# Patient Record
Sex: MALE | Race: WHITE | NOT HISPANIC OR LATINO | Employment: OTHER | URBAN - METROPOLITAN AREA
[De-identification: names, ages, dates, MRNs, and addresses within clinical notes are randomized per-mention and may not be internally consistent; named-entity substitution may affect disease eponyms.]

---

## 2017-01-23 RX ORDER — LATANOPROST 50 UG/ML
SOLUTION/ DROPS OPHTHALMIC
Qty: 2.5 ML | Refills: 0 | Status: SHIPPED | OUTPATIENT
Start: 2017-01-23 | End: 2017-02-18 | Stop reason: SDUPTHER

## 2017-02-20 RX ORDER — LATANOPROST 50 UG/ML
SOLUTION/ DROPS OPHTHALMIC
Qty: 2.5 ML | Refills: 0 | Status: SHIPPED | OUTPATIENT
Start: 2017-02-20 | End: 2017-03-20 | Stop reason: SDUPTHER

## 2017-03-20 RX ORDER — LATANOPROST 50 UG/ML
SOLUTION/ DROPS OPHTHALMIC
Qty: 2.5 ML | Refills: 0 | Status: SHIPPED | OUTPATIENT
Start: 2017-03-20 | End: 2017-04-13 | Stop reason: SDUPTHER

## 2017-04-13 RX ORDER — LATANOPROST 50 UG/ML
SOLUTION/ DROPS OPHTHALMIC
Qty: 2.5 ML | Refills: 0 | Status: SHIPPED | OUTPATIENT
Start: 2017-04-13 | End: 2017-05-14 | Stop reason: SDUPTHER

## 2017-05-15 RX ORDER — LATANOPROST 50 UG/ML
SOLUTION/ DROPS OPHTHALMIC
Qty: 2.5 ML | Refills: 0 | Status: SHIPPED | OUTPATIENT
Start: 2017-05-15 | End: 2017-06-23 | Stop reason: SDUPTHER

## 2017-05-15 RX ORDER — METOPROLOL TARTRATE 25 MG/1
TABLET, FILM COATED ORAL
Qty: 60 TABLET | Refills: 7 | Status: ON HOLD | OUTPATIENT
Start: 2017-05-15 | End: 2018-03-22 | Stop reason: HOSPADM

## 2017-06-23 RX ORDER — LATANOPROST 50 UG/ML
SOLUTION/ DROPS OPHTHALMIC
Qty: 2.5 ML | Refills: 0 | Status: SHIPPED | OUTPATIENT
Start: 2017-06-23 | End: 2017-07-29 | Stop reason: SDUPTHER

## 2017-07-31 ENCOUNTER — HOSPITAL ENCOUNTER (OUTPATIENT)
Dept: RADIOLOGY | Facility: HOSPITAL | Age: 82
Discharge: HOME | End: 2017-07-31
Attending: INTERNAL MEDICINE
Payer: MEDICARE

## 2017-07-31 ENCOUNTER — OFFICE VISIT (OUTPATIENT)
Dept: FAMILY MEDICINE | Facility: CLINIC | Age: 82
End: 2017-07-31
Payer: MEDICARE

## 2017-07-31 VITALS
HEART RATE: 72 BPM | DIASTOLIC BLOOD PRESSURE: 78 MMHG | RESPIRATION RATE: 16 BRPM | BODY MASS INDEX: 24.6 KG/M2 | HEIGHT: 70 IN | WEIGHT: 171.81 LBS | SYSTOLIC BLOOD PRESSURE: 118 MMHG

## 2017-07-31 DIAGNOSIS — J40 BRONCHITIS: Primary | ICD-10-CM

## 2017-07-31 DIAGNOSIS — R06.02 SOB (SHORTNESS OF BREATH): ICD-10-CM

## 2017-07-31 DIAGNOSIS — R05.9 COUGH: ICD-10-CM

## 2017-07-31 PROCEDURE — 94640 AIRWAY INHALATION TREATMENT: CPT | Mod: PBBFAC

## 2017-07-31 PROCEDURE — 99999 PR STA SHADOW: CPT | Mod: PBBFAC,,,

## 2017-07-31 PROCEDURE — 99213 OFFICE O/P EST LOW 20 MIN: CPT | Mod: PBBFAC,25 | Performed by: NURSE PRACTITIONER

## 2017-07-31 PROCEDURE — 71020 XR CHEST PA AND LATERAL: CPT | Mod: 26,,, | Performed by: RADIOLOGY

## 2017-07-31 PROCEDURE — 99999 PR STA SHADOW: CPT | Mod: PBBFAC,,, | Performed by: NURSE PRACTITIONER

## 2017-07-31 PROCEDURE — 99214 OFFICE O/P EST MOD 30 MIN: CPT | Mod: S$PBB | Performed by: NURSE PRACTITIONER

## 2017-07-31 PROCEDURE — 99999 PR PBB SHADOW E&M-EST. PATIENT-LVL III: CPT | Mod: PBBFAC,,, | Performed by: NURSE PRACTITIONER

## 2017-07-31 PROCEDURE — 96372 THER/PROPH/DIAG INJ SC/IM: CPT | Mod: PBBFAC,59

## 2017-07-31 RX ORDER — PROMETHAZINE HYDROCHLORIDE AND CODEINE PHOSPHATE 6.25; 1 MG/5ML; MG/5ML
5 SOLUTION ORAL 2 TIMES DAILY PRN
Qty: 90 ML | Refills: 0 | Status: SHIPPED | OUTPATIENT
Start: 2017-07-31 | End: 2017-08-10

## 2017-07-31 RX ORDER — ALBUTEROL SULFATE 0.83 MG/ML
2.5 SOLUTION RESPIRATORY (INHALATION)
Status: COMPLETED | OUTPATIENT
Start: 2017-07-31 | End: 2017-07-31

## 2017-07-31 RX ORDER — ALBUTEROL SULFATE 90 UG/1
2 AEROSOL, METERED RESPIRATORY (INHALATION) EVERY 6 HOURS PRN
Qty: 18 G | Refills: 0 | Status: SHIPPED | OUTPATIENT
Start: 2017-07-31 | End: 2017-08-16 | Stop reason: SDUPTHER

## 2017-07-31 RX ORDER — CYCLOSPORINE 0.5 MG/ML
EMULSION OPHTHALMIC
Refills: 9 | COMMUNITY
Start: 2017-06-20 | End: 2017-08-10

## 2017-07-31 RX ORDER — METHYLPREDNISOLONE ACETATE 40 MG/ML
40 INJECTION, SUSPENSION INTRA-ARTICULAR; INTRALESIONAL; INTRAMUSCULAR; SOFT TISSUE
Status: COMPLETED | OUTPATIENT
Start: 2017-07-31 | End: 2017-07-31

## 2017-07-31 RX ORDER — CEPHALEXIN 250 MG/1
250 CAPSULE ORAL EVERY 12 HOURS
Qty: 20 CAPSULE | Refills: 0 | Status: SHIPPED | OUTPATIENT
Start: 2017-07-31 | End: 2017-08-10

## 2017-07-31 RX ORDER — LATANOPROST 50 UG/ML
SOLUTION/ DROPS OPHTHALMIC
Qty: 2.5 ML | Refills: 0 | Status: SHIPPED | OUTPATIENT
Start: 2017-07-31 | End: 2017-08-22 | Stop reason: SDUPTHER

## 2017-07-31 RX ORDER — CEFTRIAXONE 250 MG/1
250 INJECTION, POWDER, FOR SOLUTION INTRAMUSCULAR; INTRAVENOUS
Status: COMPLETED | OUTPATIENT
Start: 2017-07-31 | End: 2017-07-31

## 2017-07-31 RX ADMIN — CEFTRIAXONE SODIUM 250 MG: 250 INJECTION, POWDER, FOR SOLUTION INTRAMUSCULAR; INTRAVENOUS at 06:07

## 2017-07-31 RX ADMIN — METHYLPREDNISOLONE ACETATE 40 MG: 40 INJECTION, SUSPENSION INTRA-ARTICULAR; INTRALESIONAL; INTRAMUSCULAR; SOFT TISSUE at 06:07

## 2017-07-31 RX ADMIN — ALBUTEROL SULFATE 2.5 MG: 2.5 SOLUTION RESPIRATORY (INHALATION) at 05:07

## 2017-07-31 NOTE — PROGRESS NOTES
Subjective:       Patient ID: See Leo is a 89 y.o. male.    Chief Complaint: Cough    Saw Dr. Anderson this morning with chest pain. Ruled out any cardiac cause. Has been wheezing and coughing for 2-3 weeks and getting worse      Cough   Episode onset: 2-3 weeks ago. The problem has been gradually worsening. The cough is productive of sputum. Associated symptoms include chest pain (this morning) and wheezing. Pertinent negatives include no ear pain, fever, headaches, rash, sore throat or shortness of breath.     Review of Systems   Constitutional: Negative.  Negative for appetite change, fatigue and fever.   HENT: Negative.  Negative for congestion, ear pain and sore throat.    Eyes: Negative.  Negative for visual disturbance.   Respiratory: Positive for cough and wheezing. Negative for shortness of breath.    Cardiovascular: Positive for chest pain (this morning). Negative for palpitations.   Gastrointestinal: Negative.  Negative for abdominal pain, diarrhea, nausea and vomiting.   Genitourinary: Negative.  Negative for difficulty urinating.   Musculoskeletal: Negative.    Skin: Negative.  Negative for rash.   Neurological: Negative.  Negative for headaches.   Psychiatric/Behavioral: Negative.  Negative for sleep disturbance. The patient is not nervous/anxious.    All other systems reviewed and are negative.      Objective:      Physical Exam   Constitutional: He is oriented to person, place, and time. He appears well-developed and well-nourished.   HENT:   Head: Normocephalic.   Nose: Nose normal.   Mouth/Throat: Oropharynx is clear and moist.   Eyes: Conjunctivae and EOM are normal. Pupils are equal, round, and reactive to light.   Neck: Normal range of motion. Neck supple.   Cardiovascular: Normal rate, regular rhythm and normal heart sounds.    No murmur heard.  Pulmonary/Chest: Effort normal. He has wheezes (throughout with decreased BS on exhalation).   Sounds much better after nebulizer treatment    Abdominal: Soft. Bowel sounds are normal.   Musculoskeletal: Normal range of motion.   Neurological: He is alert and oriented to person, place, and time.   Skin: Skin is warm and dry.   Psychiatric: He has a normal mood and affect.   Nursing note and vitals reviewed.      Assessment:       1. Bronchitis    2. Cough        Plan:   See was seen today for cough.    Diagnoses and all orders for this visit:    Bronchitis  -     albuterol nebulizer solution 2.5 mg; Take 3 mLs (2.5 mg total) by nebulization one time.  -     cefTRIAXone injection 250 mg; Inject 250 mg into the muscle one time.  -     methylPREDNISolone acetate injection 40 mg; Inject 1 mL (40 mg total) into the muscle one time.  -     cephALEXin (KEFLEX) 250 MG capsule; Take 1 capsule (250 mg total) by mouth every 12 (twelve) hours.  -     albuterol 90 mcg/actuation inhaler; Inhale 2 puffs into the lungs every 6 (six) hours as needed for Wheezing.    Cough  -     promethazine-codeine 6.25-10 mg/5 ml (PHENERGAN WITH CODEINE) 6.25-10 mg/5 mL syrup; Take 5 mLs by mouth 2 (two) times daily as needed for Cough.    RTC next week to see Dr. Gonzalez

## 2017-08-10 ENCOUNTER — OFFICE VISIT (OUTPATIENT)
Dept: FAMILY MEDICINE | Facility: CLINIC | Age: 82
End: 2017-08-10
Payer: MEDICARE

## 2017-08-10 VITALS
WEIGHT: 172.81 LBS | HEART RATE: 90 BPM | DIASTOLIC BLOOD PRESSURE: 60 MMHG | HEIGHT: 70 IN | BODY MASS INDEX: 24.74 KG/M2 | RESPIRATION RATE: 20 BRPM | SYSTOLIC BLOOD PRESSURE: 108 MMHG

## 2017-08-10 DIAGNOSIS — J40 BRONCHITIS: Primary | ICD-10-CM

## 2017-08-10 DIAGNOSIS — J44.1 CHRONIC OBSTRUCTIVE PULMONARY DISEASE WITH ACUTE EXACERBATION: ICD-10-CM

## 2017-08-10 PROCEDURE — 99212 OFFICE O/P EST SF 10 MIN: CPT | Mod: PBBFAC | Performed by: FAMILY MEDICINE

## 2017-08-10 PROCEDURE — 99999 PR STA SHADOW: CPT | Mod: PBBFAC,,, | Performed by: FAMILY MEDICINE

## 2017-08-10 PROCEDURE — 99213 OFFICE O/P EST LOW 20 MIN: CPT | Mod: S$PBB | Performed by: FAMILY MEDICINE

## 2017-08-10 PROCEDURE — 99999 PR PBB SHADOW E&M-EST. PATIENT-LVL II: CPT | Mod: PBBFAC,,, | Performed by: FAMILY MEDICINE

## 2017-08-10 RX ORDER — CEPHALEXIN 250 MG/1
250 CAPSULE ORAL 4 TIMES DAILY
COMMUNITY
End: 2017-08-16

## 2017-08-10 RX ORDER — PROMETHAZINE HYDROCHLORIDE AND CODEINE PHOSPHATE 6.25; 1 MG/5ML; MG/5ML
5 SOLUTION ORAL EVERY 4 HOURS PRN
COMMUNITY
End: 2017-08-16

## 2017-08-10 NOTE — PROGRESS NOTES
Subjective:       Patient ID: See Leo is a 89 y.o. male.    Chief Complaint: Follow-up    Pt is a 89 y.o. male who presents for evaluation and management of   Encounter Diagnoses   Name Primary?    Bronchitis Yes    Chronic obstructive pulmonary disease with acute exacerbation    .recheck  Finished keflex   No fever, still coughing, white sputum, improving   Still some wheezing  SOB     Doing well on current meds. Denies any side effects. Prevention is up to date.    Review of Systems   Constitutional: Negative for fever.   Respiratory: Positive for cough, shortness of breath and wheezing.        Objective:      Physical Exam   Constitutional: He is oriented to person, place, and time. He appears well-developed and well-nourished.   HENT:   Head: Normocephalic and atraumatic.   Right Ear: External ear normal.   Left Ear: External ear normal.   Nose: Nose normal.   Mouth/Throat: Oropharynx is clear and moist.   Eyes: Conjunctivae and EOM are normal. Pupils are equal, round, and reactive to light. Right eye exhibits no discharge. Left eye exhibits no discharge. No scleral icterus.   Neck: Normal range of motion. Neck supple. No JVD present. No tracheal deviation present. No thyromegaly present.   Cardiovascular: Normal rate, regular rhythm, normal heart sounds and intact distal pulses.    No murmur heard.  Pulmonary/Chest: Effort normal and breath sounds normal. No respiratory distress. He has no wheezes. He has no rales. He exhibits no tenderness.   Exp wheezes    Abdominal: Soft. Bowel sounds are normal. He exhibits no distension and no mass. There is no tenderness. There is no rebound and no guarding.   Musculoskeletal: Normal range of motion.   Lymphadenopathy:     He has no cervical adenopathy.   Neurological: He is alert and oriented to person, place, and time. He has normal reflexes. He displays normal reflexes. No cranial nerve deficit. He exhibits normal muscle tone. Coordination normal.   Skin: Skin  is warm and dry.   Psychiatric: He has a normal mood and affect. His behavior is normal. Judgment and thought content normal.       Assessment:       1. Bronchitis    2. Chronic obstructive pulmonary disease with acute exacerbation        Plan:   See was seen today for follow-up.    Diagnoses and all orders for this visit:    Bronchitis    Chronic obstructive pulmonary disease with acute exacerbation  -     umeclidinium-vilanterol (ANORO ELLIPTA) 62.5-25 mcg/actuation DsDv; Inhale 1 puff into the lungs once daily. Controller    acute bronchitis resolving but needs to be treated for COPD. Trying above   RTC 1 month     No Follow-up on file.

## 2017-08-16 ENCOUNTER — OFFICE VISIT (OUTPATIENT)
Dept: NEUROLOGY | Facility: CLINIC | Age: 82
End: 2017-08-16
Payer: MEDICARE

## 2017-08-16 VITALS
HEIGHT: 70 IN | WEIGHT: 170.06 LBS | HEART RATE: 80 BPM | SYSTOLIC BLOOD PRESSURE: 98 MMHG | DIASTOLIC BLOOD PRESSURE: 60 MMHG | RESPIRATION RATE: 19 BRPM | BODY MASS INDEX: 24.35 KG/M2

## 2017-08-16 DIAGNOSIS — J40 BRONCHITIS: ICD-10-CM

## 2017-08-16 DIAGNOSIS — R40.4 TRANSIENT ALTERATION OF AWARENESS: ICD-10-CM

## 2017-08-16 PROCEDURE — 99214 OFFICE O/P EST MOD 30 MIN: CPT | Mod: S$PBB | Performed by: NURSE PRACTITIONER

## 2017-08-16 PROCEDURE — 99214 OFFICE O/P EST MOD 30 MIN: CPT | Mod: PBBFAC | Performed by: NURSE PRACTITIONER

## 2017-08-16 PROCEDURE — 99999 PR PBB SHADOW E&M-EST. PATIENT-LVL IV: CPT | Mod: PBBFAC,,, | Performed by: NURSE PRACTITIONER

## 2017-08-16 PROCEDURE — 99999 PR STA SHADOW: CPT | Mod: PBBFAC,,, | Performed by: NURSE PRACTITIONER

## 2017-08-16 RX ORDER — ASPIRIN 81 MG/1
81 TABLET ORAL DAILY
Status: ON HOLD | COMMUNITY
End: 2018-02-06 | Stop reason: HOSPADM

## 2017-08-16 RX ORDER — ALBUTEROL SULFATE 90 UG/1
2 AEROSOL, METERED RESPIRATORY (INHALATION) EVERY 6 HOURS PRN
Qty: 18 INHALER | Refills: 0 | Status: SHIPPED | OUTPATIENT
Start: 2017-08-16 | End: 2017-08-17 | Stop reason: SDUPTHER

## 2017-08-16 NOTE — PROGRESS NOTES
See Leo is a 89 y.o. male with HTN, DLD, CAD with cardiomyopathy, and an Episode in 2010 of getting lost in familiar environment while driving. TIA work  up was largely unremarkable.     He presents today with report of ongoing occasional episodes of altered awareness. This occurred last week when he was at the cardiologist's office, blanked out, and did not know where he was. He wife has observed staring episodes, followed by confusion, which quickly resolves.     He sleeps quite well at night, but does have snoring. He sleeps with home O2 for his cardiac and respiratory issues.     His memory is overall good.     Review of Systems   Constitutional: Negative for fever.   HENT:        Snoring     Eyes: Negative for double vision.   Respiratory: Negative for hemoptysis.    Cardiovascular: Negative for leg swelling.   Gastrointestinal: Negative for blood in stool.   Genitourinary: Negative for hematuria.   Musculoskeletal: Negative for falls.   Skin: Negative for rash.   Neurological: Negative for dizziness and headaches.        Staring spells with confusion   Psychiatric/Behavioral: The patient does not have insomnia.      Exam:  Gen Appearance, well developed/nourished in no apparent distress  CV: 2+ distal pulses with no edema or swelling  Neuro:  MS: Awake, alert, oriented to place, person, time and situation  Sustains attention. Recent recall is intact to his recent medical history/remote memory intact, Language is full to  spontaneous speech/repetition/naming/comprehension. Fund of Knowledge is fair.  Patient has good judgement and insight and tells me about why he won't have pulmonology appointment and details of a conversation he had with cardiology   CN: Optic discs are flat with normal vasculature, PERRL, Extraoccular movements and visual fields are full. Normal facial sensation and strength, Tongue and Palate are  midline and strong. Shoulder Shrug symmetric and strong.  Motor: Normal bulk, tone, no  abnormal movements.No protonator  Sensory: intact vibration and temp  Cerebellar: Finger-nose, Rapid alternating movements intact  Gait: Normal stance, no ataxia---old injury to right leg, right knee is fused.Walks well with cane     Labs: Creatinine usually normal at baseline    Assessment/Recommendation: See Leo is a 89 y.o. male  HTN, DLD CAD with cardiomyopathy and an Episode in 2010 of getting lost in familiar environment while driving. TIA work  up was largely unremarkable. Some history of mild memory complaints.  I Recommend:  1. His prior and current memory complaints appear to be more fitting of transient alteration of awareness. I will check an EEG to assess for cortical irritability.   2. Order a CT Head, given his pacemaker, to assess for any areas of ischemia. His prior TIA workup was unremarkable.   3. If his EEG and CT Head are unremarkable, I will proceed with a sleep study to rule out CLAUDIA as the cause of his TAA episodes.   4. Continue Namenda for now.   5. He no longer drives.    Follow up in 3 months.

## 2017-08-17 ENCOUNTER — OFFICE VISIT (OUTPATIENT)
Dept: INTERNAL MEDICINE | Facility: CLINIC | Age: 82
End: 2017-08-17
Payer: MEDICARE

## 2017-08-17 VITALS
RESPIRATION RATE: 16 BRPM | OXYGEN SATURATION: 94 % | BODY MASS INDEX: 24.52 KG/M2 | SYSTOLIC BLOOD PRESSURE: 116 MMHG | DIASTOLIC BLOOD PRESSURE: 64 MMHG | HEIGHT: 70 IN | WEIGHT: 171.31 LBS | HEART RATE: 90 BPM

## 2017-08-17 DIAGNOSIS — J40 BRONCHITIS: ICD-10-CM

## 2017-08-17 DIAGNOSIS — J44.9 CHRONIC OBSTRUCTIVE PULMONARY DISEASE, UNSPECIFIED COPD TYPE: Primary | ICD-10-CM

## 2017-08-17 PROCEDURE — 99999 PR STA SHADOW: CPT | Mod: PBBFAC,,, | Performed by: INTERNAL MEDICINE

## 2017-08-17 PROCEDURE — 99213 OFFICE O/P EST LOW 20 MIN: CPT | Mod: S$PBB | Performed by: INTERNAL MEDICINE

## 2017-08-17 PROCEDURE — 99999 PR PBB SHADOW E&M-EST. PATIENT-LVL III: CPT | Mod: PBBFAC,,, | Performed by: INTERNAL MEDICINE

## 2017-08-17 PROCEDURE — 99213 OFFICE O/P EST LOW 20 MIN: CPT | Mod: PBBFAC | Performed by: INTERNAL MEDICINE

## 2017-08-17 RX ORDER — ALBUTEROL SULFATE 90 UG/1
2 AEROSOL, METERED RESPIRATORY (INHALATION) EVERY 6 HOURS PRN
Qty: 18 INHALER | Refills: 0 | Status: SHIPPED | OUTPATIENT
Start: 2017-08-17 | End: 2017-10-16 | Stop reason: SDUPTHER

## 2017-08-17 RX ORDER — METHYLPREDNISOLONE 4 MG/1
TABLET ORAL
Qty: 1 PACKAGE | Refills: 0 | Status: SHIPPED | OUTPATIENT
Start: 2017-08-17 | End: 2017-09-07

## 2017-08-17 RX ORDER — AZITHROMYCIN 250 MG/1
TABLET, FILM COATED ORAL
Qty: 6 TABLET | Refills: 0 | Status: SHIPPED | OUTPATIENT
Start: 2017-08-17 | End: 2017-08-22

## 2017-08-17 NOTE — PROGRESS NOTES
Subjective:       Patient ID: See Leo is a 89 y.o. male.    Chief Complaint: Shortness of Breath    See Leo is a 89 y.o. male  Here with 3 weeks history of cough ;wheezing :   finished his antibiotics; shots. Reports feeling better .  Reports shortness of breath with exertion; relieved with rest   He is using anoro daily   Seen butch : reports ran out albuterol       Shortness of Breath   Associated symptoms include wheezing. Pertinent negatives include no abdominal pain, chest pain, fever, headaches, leg swelling, rash, rhinorrhea, sore throat or vomiting.     Review of Systems   Constitutional: Positive for fatigue. Negative for activity change, chills and fever.        Low grade fever   HENT: Negative for congestion, postnasal drip, rhinorrhea and sore throat.    Eyes: Negative for pain, discharge and visual disturbance.   Respiratory: Positive for cough, shortness of breath and wheezing.    Cardiovascular: Negative for chest pain, palpitations and leg swelling.   Gastrointestinal: Negative for abdominal distention, abdominal pain, constipation, diarrhea, nausea and vomiting.   Musculoskeletal: Negative for back pain and joint swelling.   Skin: Negative for rash and wound.   Neurological: Negative for dizziness, syncope, weakness, light-headedness and headaches.   Psychiatric/Behavioral: Negative for confusion.       Objective:      Physical Exam   Constitutional: He is oriented to person, place, and time. He appears well-developed and well-nourished.   HENT:   Head: Normocephalic and atraumatic.   Right Ear: External ear normal.   Left Ear: External ear normal.   Nose: Nose normal.   Mouth/Throat: Oropharynx is clear and moist.   Eyes: Pupils are equal, round, and reactive to light.   Neck: Normal range of motion. Neck supple. No JVD present.   Cardiovascular: Normal rate, regular rhythm, normal heart sounds and intact distal pulses.    Pulmonary/Chest: Effort normal and breath sounds  normal.   Loose rhonchi clears some with cough   Abdominal: Soft. Bowel sounds are normal. There is no tenderness.   Musculoskeletal: Normal range of motion. He exhibits no edema.   Neurological: He is alert and oriented to person, place, and time. He has normal reflexes.   Skin: Skin is warm and dry.   Psychiatric: He has a normal mood and affect. His behavior is normal. Judgment and thought content normal.   Nursing note and vitals reviewed.      Assessment:       1. Chronic obstructive pulmonary disease, unspecified COPD type    2. Bronchitis        Plan:   See was seen today for shortness of breath.    Diagnoses and all orders for this visit:    Chronic obstructive pulmonary disease, unspecified COPD type  -     albuterol (VENTOLIN HFA) 90 mcg/actuation inhaler; Inhale 2 puffs into the lungs every 6 (six) hours as needed for Wheezing.  -     methylPREDNISolone (MEDROL DOSEPACK) 4 mg tablet; use as directed  -     azithromycin (Z-POLLO) 250 MG tablet; Take 2 tablets by mouth on day 1; Take 1 tablet by mouth on days 2-5    Bronchitis  -     azithromycin (Z-POLLO) 250 MG tablet; Take 2 tablets by mouth on day 1; Take 1 tablet by mouth on days 2-5    see DR Gonzalez  In 3-4 weeks.

## 2017-08-22 RX ORDER — LATANOPROST 50 UG/ML
SOLUTION/ DROPS OPHTHALMIC
Qty: 2.5 ML | Refills: 0 | Status: SHIPPED | OUTPATIENT
Start: 2017-08-22 | End: 2017-10-02 | Stop reason: SDUPTHER

## 2017-08-24 ENCOUNTER — HOSPITAL ENCOUNTER (OUTPATIENT)
Dept: RADIOLOGY | Facility: HOSPITAL | Age: 82
Discharge: HOME | End: 2017-08-24
Attending: NURSE PRACTITIONER
Payer: MEDICARE

## 2017-08-24 ENCOUNTER — HOSPITAL ENCOUNTER (OUTPATIENT)
Dept: PULMONOLOGY | Facility: HOSPITAL | Age: 82
Discharge: HOME | End: 2017-08-24
Attending: NURSE PRACTITIONER
Payer: MEDICARE

## 2017-08-24 DIAGNOSIS — R40.4 TRANSIENT ALTERATION OF AWARENESS: ICD-10-CM

## 2017-08-24 PROCEDURE — 70450 CT HEAD/BRAIN W/O DYE: CPT | Mod: TC

## 2017-08-24 PROCEDURE — 95816 EEG AWAKE AND DROWSY: CPT | Mod: 26,,, | Performed by: PSYCHIATRY & NEUROLOGY

## 2017-08-24 PROCEDURE — 70450 CT HEAD/BRAIN W/O DYE: CPT | Mod: 26,,, | Performed by: RADIOLOGY

## 2017-08-24 PROCEDURE — 95819 EEG AWAKE AND ASLEEP: CPT

## 2017-08-24 NOTE — PROGRESS NOTES
EEG REPORT      Patients Name: Felipa Cui  Date of Recordin17  Technician: Carmel  Patient :25    Referring Provider: ALEJANDRO Shahid  Reason for Exam: Seizure  Notable medications: None significant      INTRODUCTION:  This EEG was  recorded using 10-channels and the International 10/20 electrode placement system.  The patient was not sleep deprived.    FINDINGS:  This EEG was recording during wakefulness and drowsiness only.  A 10  Hz posterior dominant rhythm was persistently observed and of normal amplitude and symmetry.   Mild muscle artifact, Eye Blinks, Beta frequency artifact occurred throughout the recording  Focal slowing was not observed.   Epileptiform activity was not observed    Hyperventilation: Was not Performed    Photic Stimulation: Was not Performed    Clinical Impression:  Normal awake and  Drowsy EEG.      Kit Omalley MD

## 2017-08-25 ENCOUNTER — TELEPHONE (OUTPATIENT)
Dept: FAMILY MEDICINE | Facility: CLINIC | Age: 82
End: 2017-08-25

## 2017-08-25 NOTE — TELEPHONE ENCOUNTER
----- Message from Chuckie Horta sent at 2017  4:13 PM CDT -----  Contact: Wife - Sury Leo  MRN: [id number]  : 1925  PCP: Suhail Gonzalez  Home Phone      [phone]  Work Phone      Not on file.  Mobile          [phone]      MESSAGE: would like test results - had CT& EEG done yesterday (Dr Deborah Shahid) -- may need followup appt -- please advise    Call [phone]    PCP: Lisa

## 2017-08-25 NOTE — TELEPHONE ENCOUNTER
No answer       Notes Recorded by Leticia Martin LPN on 8/24/2017 at 2:18 PM CDT  Pt notified of results. Pt voiced understanding    Notes Recorded by Deborah Shahid NP on 8/24/2017 at 11:44 AM CDT  No stroke on Head CT.

## 2017-08-28 NOTE — TELEPHONE ENCOUNTER
Wife was notified the EEG has not been resulted and she will be called once results are final. Wife voiced understanding.

## 2017-08-29 DIAGNOSIS — G47.10 HYPERSOMNIA: ICD-10-CM

## 2017-08-29 DIAGNOSIS — R40.4 TRANSIENT ALTERATION OF AWARENESS: Primary | ICD-10-CM

## 2017-08-29 DIAGNOSIS — R06.83 SNORING: ICD-10-CM

## 2017-09-07 ENCOUNTER — OFFICE VISIT (OUTPATIENT)
Dept: FAMILY MEDICINE | Facility: CLINIC | Age: 82
End: 2017-09-07
Payer: MEDICARE

## 2017-09-07 VITALS
HEART RATE: 88 BPM | DIASTOLIC BLOOD PRESSURE: 58 MMHG | RESPIRATION RATE: 10 BRPM | BODY MASS INDEX: 24.52 KG/M2 | SYSTOLIC BLOOD PRESSURE: 90 MMHG | HEIGHT: 70 IN | OXYGEN SATURATION: 96 % | WEIGHT: 171.31 LBS

## 2017-09-07 DIAGNOSIS — J44.1 CHRONIC OBSTRUCTIVE PULMONARY DISEASE WITH ACUTE EXACERBATION: ICD-10-CM

## 2017-09-07 PROCEDURE — 99999 PR STA SHADOW: CPT | Mod: PBBFAC,,, | Performed by: FAMILY MEDICINE

## 2017-09-07 PROCEDURE — 99999 PR PBB SHADOW E&M-EST. PATIENT-LVL III: CPT | Mod: PBBFAC,,, | Performed by: FAMILY MEDICINE

## 2017-09-07 PROCEDURE — 99213 OFFICE O/P EST LOW 20 MIN: CPT | Mod: PBBFAC | Performed by: FAMILY MEDICINE

## 2017-09-07 PROCEDURE — 99213 OFFICE O/P EST LOW 20 MIN: CPT | Mod: S$PBB | Performed by: FAMILY MEDICINE

## 2017-09-07 RX ORDER — BUDESONIDE 0.5 MG/2ML
0.5 INHALANT ORAL 2 TIMES DAILY
Qty: 60 ML | Refills: 5 | Status: SHIPPED | OUTPATIENT
Start: 2017-09-07 | End: 2017-12-26 | Stop reason: SDUPTHER

## 2017-09-07 RX ORDER — IPRATROPIUM BROMIDE AND ALBUTEROL SULFATE 2.5; .5 MG/3ML; MG/3ML
3 SOLUTION RESPIRATORY (INHALATION) EVERY 6 HOURS PRN
Qty: 1 BOX | Refills: 2 | Status: SHIPPED | OUTPATIENT
Start: 2017-09-07 | End: 2018-09-07

## 2017-09-07 NOTE — PROGRESS NOTES
Subjective:       Patient ID: See Leo is a 89 y.o. male.    Chief Complaint: Follow-up (cough)    Pt is a 89 y.o. male who presents for evaluation and management of   Encounter Diagnosis   Name Primary?    Chronic obstructive pulmonary disease with acute exacerbation    .  Doing well on current meds. Denies any side effects. Prevention is up to date.  Review of Systems   Constitutional: Negative for fever.   Respiratory: Positive for cough, shortness of breath and wheezing.         Symptoms improved wit hanoro but wears off in the afternoon          Objective:      Physical Exam   Constitutional: He is oriented to person, place, and time. He appears well-developed and well-nourished.   HENT:   Head: Normocephalic and atraumatic.   Right Ear: External ear normal.   Left Ear: External ear normal.   Nose: Nose normal.   Mouth/Throat: Oropharynx is clear and moist.   Eyes: Conjunctivae and EOM are normal. Pupils are equal, round, and reactive to light. Right eye exhibits no discharge. Left eye exhibits no discharge. No scleral icterus.   Neck: Normal range of motion. Neck supple. No JVD present. No tracheal deviation present. No thyromegaly present.   Cardiovascular: Normal rate, regular rhythm, normal heart sounds and intact distal pulses.    No murmur heard.  Pulmonary/Chest: Effort normal and breath sounds normal. No respiratory distress. He has no wheezes. He has no rales. He exhibits no tenderness.   Exp wheezes    Abdominal: Soft. Bowel sounds are normal. He exhibits no distension and no mass. There is no tenderness. There is no rebound and no guarding.   Musculoskeletal: Normal range of motion.   Lymphadenopathy:     He has no cervical adenopathy.   Neurological: He is alert and oriented to person, place, and time. He has normal reflexes. He displays normal reflexes. No cranial nerve deficit. He exhibits normal muscle tone. Coordination normal.   Skin: Skin is warm and dry.   Psychiatric: He has a normal  mood and affect. His behavior is normal. Judgment and thought content normal.       Assessment:       1. Chronic obstructive pulmonary disease with acute exacerbation        Plan:   See was seen today for follow-up.    Diagnoses and all orders for this visit:    Chronic obstructive pulmonary disease with acute exacerbation  -     umeclidinium-vilanterol (ANORO ELLIPTA) 62.5-25 mcg/actuation DsDv; Inhale 1 puff into the lungs once daily. Controller  -     NEBULIZER FOR HOME USE  -     OXYGEN FOR HOME USE  -     albuterol-ipratropium 2.5mg-0.5mg/3mL (DUO-NEB) 0.5 mg-3 mg(2.5 mg base)/3 mL nebulizer solution; Take 3 mLs by nebulization every 6 (six) hours as needed for Wheezing. Rescue  -     budesonide (PULMICORT) 0.5 mg/2 mL nebulizer solution; Take 2 mLs (0.5 mg total) by nebulization 2 (two) times daily. Controller    add pulmicort BID and duoneb for prn   RTC 3 months     No Follow-up on file.

## 2017-09-11 RX ORDER — TAMSULOSIN HYDROCHLORIDE 0.4 MG/1
0.4 CAPSULE ORAL DAILY
Qty: 30 CAPSULE | Refills: 11 | Status: SHIPPED | OUTPATIENT
Start: 2017-09-11 | End: 2018-09-11

## 2017-10-02 RX ORDER — LATANOPROST 50 UG/ML
SOLUTION/ DROPS OPHTHALMIC
Qty: 2.5 ML | Refills: 0 | Status: SHIPPED | OUTPATIENT
Start: 2017-10-02 | End: 2017-11-16 | Stop reason: SDUPTHER

## 2017-10-03 RX ORDER — MEMANTINE HYDROCHLORIDE 10 MG/1
10 TABLET ORAL 2 TIMES DAILY
Qty: 180 TABLET | Refills: 3 | Status: SHIPPED | OUTPATIENT
Start: 2017-10-03

## 2017-10-16 DIAGNOSIS — J44.9 CHRONIC OBSTRUCTIVE PULMONARY DISEASE, UNSPECIFIED COPD TYPE: ICD-10-CM

## 2017-10-17 RX ORDER — ALBUTEROL SULFATE 90 UG/1
2 AEROSOL, METERED RESPIRATORY (INHALATION) EVERY 6 HOURS PRN
Qty: 18 INHALER | Refills: 0 | Status: ON HOLD | OUTPATIENT
Start: 2017-10-17 | End: 2018-03-22 | Stop reason: HOSPADM

## 2017-10-21 ENCOUNTER — HOSPITAL ENCOUNTER (EMERGENCY)
Facility: HOSPITAL | Age: 82
Discharge: HOME | End: 2017-10-21
Attending: SURGERY
Payer: MEDICARE

## 2017-10-21 VITALS
SYSTOLIC BLOOD PRESSURE: 123 MMHG | WEIGHT: 174 LBS | HEIGHT: 70 IN | OXYGEN SATURATION: 95 % | RESPIRATION RATE: 28 BRPM | BODY MASS INDEX: 24.91 KG/M2 | DIASTOLIC BLOOD PRESSURE: 63 MMHG | TEMPERATURE: 97 F | HEART RATE: 87 BPM

## 2017-10-21 DIAGNOSIS — R05.9 COUGH: ICD-10-CM

## 2017-10-21 DIAGNOSIS — J40 BRONCHITIS: Primary | ICD-10-CM

## 2017-10-21 LAB
ALBUMIN SERPL BCP-MCNC: 2.8 G/DL
ALP SERPL-CCNC: 98 U/L
ALT SERPL W/O P-5'-P-CCNC: 11 U/L
ANION GAP SERPL CALC-SCNC: 9 MMOL/L
APTT BLDCRRT: 25.2 SEC
AST SERPL-CCNC: 12 U/L
BASOPHILS # BLD AUTO: 0.01 K/UL
BASOPHILS NFR BLD: 0.1 %
BILIRUB SERPL-MCNC: 0.8 MG/DL
BNP SERPL-MCNC: 452 PG/ML
BUN SERPL-MCNC: 14 MG/DL
CALCIUM SERPL-MCNC: 9.2 MG/DL
CHLORIDE SERPL-SCNC: 103 MMOL/L
CK MB SERPL-MCNC: 1 NG/ML
CK MB SERPL-MCNC: 1 NG/ML
CK MB SERPL-RTO: 3.1 %
CK MB SERPL-RTO: 4.5 %
CK SERPL-CCNC: 22 U/L
CK SERPL-CCNC: 22 U/L
CK SERPL-CCNC: 32 U/L
CK SERPL-CCNC: 32 U/L
CO2 SERPL-SCNC: 25 MMOL/L
CREAT SERPL-MCNC: 0.9 MG/DL
DIFFERENTIAL METHOD: ABNORMAL
EOSINOPHIL # BLD AUTO: 0 K/UL
EOSINOPHIL NFR BLD: 0.3 %
ERYTHROCYTE [DISTWIDTH] IN BLOOD BY AUTOMATED COUNT: 15.9 %
EST. GFR  (AFRICAN AMERICAN): >60 ML/MIN/1.73 M^2
EST. GFR  (NON AFRICAN AMERICAN): >60 ML/MIN/1.73 M^2
GLUCOSE SERPL-MCNC: 94 MG/DL
HCT VFR BLD AUTO: 43.5 %
HGB BLD-MCNC: 14.4 G/DL
INR PPP: 1.1
LYMPHOCYTES # BLD AUTO: 1 K/UL
LYMPHOCYTES NFR BLD: 10.8 %
MCH RBC QN AUTO: 29.5 PG
MCHC RBC AUTO-ENTMCNC: 33.1 G/DL
MCV RBC AUTO: 89 FL
MONOCYTES # BLD AUTO: 0.7 K/UL
MONOCYTES NFR BLD: 7.6 %
NEUTROPHILS # BLD AUTO: 7.6 K/UL
NEUTROPHILS NFR BLD: 81.2 %
PLATELET # BLD AUTO: 239 K/UL
PMV BLD AUTO: 9.5 FL
POTASSIUM SERPL-SCNC: 4.2 MMOL/L
PROT SERPL-MCNC: 6.8 G/DL
PROTHROMBIN TIME: 10.8 SEC
RBC # BLD AUTO: 4.88 M/UL
SODIUM SERPL-SCNC: 137 MMOL/L
TROPONIN I SERPL DL<=0.01 NG/ML-MCNC: 0.02 NG/ML
TROPONIN I SERPL DL<=0.01 NG/ML-MCNC: 0.02 NG/ML
WBC # BLD AUTO: 9.33 K/UL

## 2017-10-21 PROCEDURE — 99284 EMERGENCY DEPT VISIT MOD MDM: CPT | Mod: 25

## 2017-10-21 PROCEDURE — 36415 COLL VENOUS BLD VENIPUNCTURE: CPT

## 2017-10-21 PROCEDURE — 94640 AIRWAY INHALATION TREATMENT: CPT

## 2017-10-21 PROCEDURE — 96372 THER/PROPH/DIAG INJ SC/IM: CPT

## 2017-10-21 PROCEDURE — 25000242 PHARM REV CODE 250 ALT 637 W/ HCPCS: Performed by: SURGERY

## 2017-10-21 PROCEDURE — 83880 ASSAY OF NATRIURETIC PEPTIDE: CPT

## 2017-10-21 PROCEDURE — 82553 CREATINE MB FRACTION: CPT | Mod: 91

## 2017-10-21 PROCEDURE — 85610 PROTHROMBIN TIME: CPT

## 2017-10-21 PROCEDURE — 85730 THROMBOPLASTIN TIME PARTIAL: CPT

## 2017-10-21 PROCEDURE — 93005 ELECTROCARDIOGRAM TRACING: CPT

## 2017-10-21 PROCEDURE — 80053 COMPREHEN METABOLIC PANEL: CPT

## 2017-10-21 PROCEDURE — 84484 ASSAY OF TROPONIN QUANT: CPT | Mod: 91

## 2017-10-21 PROCEDURE — 85025 COMPLETE CBC W/AUTO DIFF WBC: CPT

## 2017-10-21 PROCEDURE — 63600175 PHARM REV CODE 636 W HCPCS: Performed by: SURGERY

## 2017-10-21 RX ORDER — LEVOFLOXACIN 500 MG/1
500 TABLET, FILM COATED ORAL DAILY
Qty: 7 TABLET | Refills: 0 | Status: SHIPPED | OUTPATIENT
Start: 2017-10-21 | End: 2017-10-28

## 2017-10-21 RX ORDER — IPRATROPIUM BROMIDE AND ALBUTEROL SULFATE 2.5; .5 MG/3ML; MG/3ML
3 SOLUTION RESPIRATORY (INHALATION)
Status: COMPLETED | OUTPATIENT
Start: 2017-10-21 | End: 2017-10-21

## 2017-10-21 RX ORDER — METHYLPREDNISOLONE SOD SUCC 125 MG
125 VIAL (EA) INJECTION
Status: COMPLETED | OUTPATIENT
Start: 2017-10-21 | End: 2017-10-21

## 2017-10-21 RX ORDER — PROMETHAZINE HYDROCHLORIDE AND CODEINE PHOSPHATE 6.25; 1 MG/5ML; MG/5ML
5 SOLUTION ORAL EVERY 4 HOURS PRN
Qty: 120 ML | Refills: 0 | Status: SHIPPED | OUTPATIENT
Start: 2017-10-21 | End: 2017-10-31

## 2017-10-21 RX ORDER — METHYLPREDNISOLONE 4 MG/1
TABLET ORAL
Qty: 1 PACKAGE | Refills: 0 | Status: SHIPPED | OUTPATIENT
Start: 2017-10-21 | End: 2017-11-17

## 2017-10-21 RX ADMIN — METHYLPREDNISOLONE SODIUM SUCCINATE 125 MG: 125 INJECTION, POWDER, FOR SOLUTION INTRAMUSCULAR; INTRAVENOUS at 01:10

## 2017-10-21 RX ADMIN — IPRATROPIUM BROMIDE AND ALBUTEROL SULFATE 3 ML: .5; 3 SOLUTION RESPIRATORY (INHALATION) at 12:10

## 2017-10-21 RX ADMIN — IPRATROPIUM BROMIDE AND ALBUTEROL SULFATE 3 ML: .5; 3 SOLUTION RESPIRATORY (INHALATION) at 01:10

## 2017-10-21 NOTE — ED PROVIDER NOTES
[facility]                                     October 21, 2017                   Chief Complaint  89 y.o. 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  Pt has a stable chest x-ray, family states is very noncompliant with meds/treatment    History is reviewed on the patient    Past Medical History   -- COPD (chronic obstructive pulmonary disease)      -- DM (diabetes mellitus)      -- Dyslipidemia      -- GERD (gastroesophageal reflux disease)      -- Heart failure      -- Paroxysmal a-fib      -- Carotid artery disease      -- S/P CABG x 4      -- HTN (hypertension), benign      -- CAD (coronary artery disease), native coronary artery      -- Cardiomyopathy      -- Left ventricular systolic dysfunction      -- Esophageal reflux          Past Surgical History   -- Hip surgery         -- Coronary artery bypass graft         -- Leg surgery         -- Tonsillectomy          No Known Allergies   History reviewed. No pertinent family history.    Review of Systems and Physical Exam     Review of Systems  -- Constitution - no fever, denies fatigue, no weakness, no chills  -- Eyes - no tearing or redness, no visual disturbance  -- Ear, Nose - no tinnitus or earache, no nasal congestion or discharge  -- Mouth,Throat - no sore throat, no toothache, normal voice, normal swallowing  -- Respiratory - cough and congestion, shortness of breath, no IBRAHIM  -- Cardiovascular - denies chest pain, no palpitations, denies claudication  -- Gastrointestinal - denies abdominal pain, nausea, vomiting,  or diarrhea  -- Musculoskeletal - denies back pain, negative for myalgias and arthralgias   -- Neurological - no headache, denies weakness or seizure; no LOC  -- Skin - denies pallor, rash, or changes in skin. no hives or welts noted    Vital Signs  -- His tympanic temperature is 96.9 °F (36.1 °C).  -- His blood pressure is 123/63 and his pulse is 87.   -- His respiration is 28 (abnormal) and oxygen saturation is 95%.      Physical Exam  -- Nursing note and vitals reviewed  -- Head: Atraumatic. Normocephalic. No obvious abnormality  -- Eyes: Pupils are equal and reactive to light. Normal conjunctiva and lids  -- Nose: Nose normal in appearance, nares grossly normal. No discharge  -- Throat: Mucous membranes moist, pharynx normal, normal tonsils. No lesions   -- Ears: External ears and TM normal bilaterally. Normal hearing and no drainage  -- Neck: Normal range of motion. Neck supple. No masses, trachea midline  -- Cardiac: Normal rate, regular rhythm and normal heart sounds  -- Pulmonary: faint rhonchi at the bilateral bases with no active wheezing   -- Abdominal: Soft, no tenderness. Normal bowel sounds. Normal liver edge  -- Musculoskeletal: Normal range of motion, no effusions. Joints stable   -- Neurological: No focal deficits. Showed good interaction with staff  -- Vascular: Posterior tibial, dorsalis pedis and radial pulses 2+ bilaterally      Emergency Room Course     Lab values  --    -- K 4.2   --    -- CO2 25   -- BUN 14   -- CREATININE 0.9   -- GLU 94   -- ALKPHOS 98   -- AST 12   -- ALT 11   -- BILITOT 0.8   -- ALBUMIN 2.8 (L)   -- PROT 6.8   -- WBC 9.33   -- HGB 14.4   -- HCT 43.5   --    -- CPK 32   -- CPK 32   -- CPKMB 1.0   -- TROPONINI 0.017   -- INR 1.1   --  (H)     EKG  -- The EKG findings today were without concerning findings from baseline  -- The troponin drawn in the ER today was within normal limits  -- The 2nd troponin drawn in the ER today was within normal limits      Radiology  -- Chest x-ray showed no infiltrate and showed no acute pathology     Medications Given  -- albuterol-ipratropium 2.5mg-0.5mg/3mL nebulizer solution 3 mL    -- albuterol-ipratropium 2.5mg-0.5mg/3mL nebulizer solution 3 mL    -- methylPREDNISolone sodium succinate injection 125 mg      Diagnosis  -- The primary encounter diagnosis was Bronchitis.   -- A diagnosis of Cough was also pertinent to this visit.    Disposition and Plan  -- Disposition: home  -- Condition: stable  -- Follow-up: Patient to follow up with Suhail Gonzalez MD in 1-2 days.  -- I advised the patient that we have found no life threatening condition today  -- At this time, I believe the patient is clinically stable for discharge.   -- The patient acknowledges that close follow up with a MD is required   -- Patient agrees to comply with all instruction and direction given in the ER    This note is dictated on Dragon Natural Speaking word recognition program.  There are word recognition mistakes that are occasionally missed on review.           Jeb Chávez MD  10/22/17 0618

## 2017-10-21 NOTE — ED TRIAGE NOTES
Patient comes in today stating he has had trouble breathing and spitting out blood.  This has been going on for about a week.

## 2017-10-24 ENCOUNTER — OFFICE VISIT (OUTPATIENT)
Dept: FAMILY MEDICINE | Facility: CLINIC | Age: 82
End: 2017-10-24
Payer: MEDICARE

## 2017-10-24 VITALS
HEART RATE: 84 BPM | OXYGEN SATURATION: 97 % | BODY MASS INDEX: 23.68 KG/M2 | SYSTOLIC BLOOD PRESSURE: 120 MMHG | WEIGHT: 165.38 LBS | DIASTOLIC BLOOD PRESSURE: 58 MMHG | HEIGHT: 70 IN | RESPIRATION RATE: 20 BRPM

## 2017-10-24 DIAGNOSIS — J44.1 CHRONIC OBSTRUCTIVE PULMONARY DISEASE WITH ACUTE EXACERBATION: Primary | ICD-10-CM

## 2017-10-24 DIAGNOSIS — J40 BRONCHITIS: ICD-10-CM

## 2017-10-24 PROCEDURE — 99999 PR STA SHADOW: CPT | Mod: PBBFAC,,, | Performed by: FAMILY MEDICINE

## 2017-10-24 PROCEDURE — 99999 PR PBB SHADOW E&M-EST. PATIENT-LVL III: CPT | Mod: PBBFAC,,, | Performed by: FAMILY MEDICINE

## 2017-10-24 PROCEDURE — 99214 OFFICE O/P EST MOD 30 MIN: CPT | Mod: S$PBB | Performed by: FAMILY MEDICINE

## 2017-10-24 PROCEDURE — 99213 OFFICE O/P EST LOW 20 MIN: CPT | Mod: PBBFAC | Performed by: FAMILY MEDICINE

## 2017-10-24 RX ORDER — FUROSEMIDE 20 MG/1
TABLET ORAL
Refills: 11 | COMMUNITY
Start: 2017-10-08 | End: 2018-03-19

## 2017-10-24 NOTE — PROGRESS NOTES
Subjective:       Patient ID: See Leo is a 89 y.o. male.    Chief Complaint: Follow-up    Patient is here for follow-up for COPD exacerbation.  He went to the emergency room 5 days ago.  Placed on a Medrol Dosepak and Levaquin.  He's been taking his albuterol treatments twice daily.  He takes Anoro once daily.  Chest x-ray was unremarkable.  He's feeling better but still coughing.      Review of Systems   Constitutional: Negative for activity change, chills, fatigue, fever and unexpected weight change.   HENT: Negative for sore throat and trouble swallowing.    Respiratory: Positive for cough, shortness of breath and wheezing. Negative for chest tightness.    Cardiovascular: Negative for chest pain, palpitations and leg swelling.   Gastrointestinal: Negative for abdominal pain.   Endocrine: Negative for cold intolerance and heat intolerance.   Genitourinary: Negative for difficulty urinating.   Musculoskeletal: Negative for back pain and joint swelling.   Skin: Negative for rash.   Neurological: Negative for numbness.   Hematological: Negative for adenopathy.   Psychiatric/Behavioral: Negative for decreased concentration.       Objective:      Vitals:    10/24/17 1422   BP: (!) 120/58   Pulse: 84   Resp: 20     Physical Exam   Constitutional: He appears well-developed and well-nourished.   Eyes: EOM are normal. Pupils are equal, round, and reactive to light.   Neck: Normal range of motion. Neck supple.   Cardiovascular: Normal rate, regular rhythm, normal heart sounds and intact distal pulses.  Exam reveals no gallop.    No murmur heard.  Pulmonary/Chest: Effort normal and breath sounds normal. He has no wheezes.   Musculoskeletal: He exhibits tenderness. He exhibits no edema.   Neurological: He has normal reflexes.       Assessment:       1. Chronic obstructive pulmonary disease with acute exacerbation    2. Bronchitis        Plan:   See was seen today for follow-up.    Diagnoses and all orders for this  visit:    Chronic obstructive pulmonary disease with acute exacerbation    Bronchitis  Finish taking Medrol Dosepak and Levaquin  Continue inhalers-Anoro, Pulmicort  Increase DuoNeb treatments  3-4 times daily    Return to clinic if not getting better or worsens.

## 2017-11-10 ENCOUNTER — TELEPHONE (OUTPATIENT)
Dept: FAMILY MEDICINE | Facility: CLINIC | Age: 82
End: 2017-11-10

## 2017-11-10 DIAGNOSIS — J44.1 CHRONIC OBSTRUCTIVE PULMONARY DISEASE WITH ACUTE EXACERBATION: ICD-10-CM

## 2017-11-10 NOTE — TELEPHONE ENCOUNTER
----- Message from Niki Dugan sent at 11/10/2017  8:39 AM CST -----  Contact: Leena/Wife  See Leo  MRN: [id number]  : 1925  PCP: Suhail Gonzalez  Home Phone      [phone]  Work Phone      Not on file.  Mobile          [phone]    MESSAGE:   Would like to speak to nurse about the directions for his inhaler.  Please call.      Phone:  [phone]

## 2017-11-10 NOTE — TELEPHONE ENCOUNTER
Spoke to pts wife and she stated that pt has seen another doctor and he was informed to use his nebulizer 4 times a day instead of twice a day.  Wife is requesting another script of Pulmicort 0.5mg/ 2ml  Stating to take 4 times daily sent to pharmacy.  Please advise,thank you    Please send to [facility] in [place].    (can not pend any medications or referrals)

## 2017-11-10 NOTE — TELEPHONE ENCOUNTER
Pulmicort is only BID.  Could have been talking about the Duoneb.  Who's the doctor?  Get more info

## 2017-11-17 ENCOUNTER — OFFICE VISIT (OUTPATIENT)
Dept: FAMILY MEDICINE | Facility: CLINIC | Age: 82
End: 2017-11-17
Payer: MEDICARE

## 2017-11-17 ENCOUNTER — HOSPITAL ENCOUNTER (EMERGENCY)
Facility: HOSPITAL | Age: 82
Discharge: HOME | End: 2017-11-18
Attending: EMERGENCY MEDICINE
Payer: MEDICARE

## 2017-11-17 VITALS
RESPIRATION RATE: 24 BRPM | HEART RATE: 84 BPM | BODY MASS INDEX: 23.48 KG/M2 | HEIGHT: 70 IN | WEIGHT: 164 LBS | DIASTOLIC BLOOD PRESSURE: 66 MMHG | SYSTOLIC BLOOD PRESSURE: 108 MMHG

## 2017-11-17 DIAGNOSIS — E78.5 DYSLIPIDEMIA: ICD-10-CM

## 2017-11-17 DIAGNOSIS — I50.42 CHF (CONGESTIVE HEART FAILURE), NYHA CLASS I, CHRONIC, COMBINED: Primary | ICD-10-CM

## 2017-11-17 DIAGNOSIS — J43.9 PULMONARY EMPHYSEMA, UNSPECIFIED EMPHYSEMA TYPE: Primary | ICD-10-CM

## 2017-11-17 DIAGNOSIS — I25.10 SYSTOLIC HEART FAILURE SECONDARY TO CORONARY ARTERY DISEASE: ICD-10-CM

## 2017-11-17 DIAGNOSIS — I48.20 CHRONIC ATRIAL FIBRILLATION: ICD-10-CM

## 2017-11-17 DIAGNOSIS — I50.20 SYSTOLIC HEART FAILURE SECONDARY TO CORONARY ARTERY DISEASE: ICD-10-CM

## 2017-11-17 DIAGNOSIS — F03.90 DEMENTIA WITHOUT BEHAVIORAL DISTURBANCE, UNSPECIFIED DEMENTIA TYPE: ICD-10-CM

## 2017-11-17 LAB
ALBUMIN SERPL BCP-MCNC: 2.8 G/DL
ALP SERPL-CCNC: 100 U/L
ALT SERPL W/O P-5'-P-CCNC: 12 U/L
ANION GAP SERPL CALC-SCNC: 11 MMOL/L
AST SERPL-CCNC: 11 U/L
BASOPHILS # BLD AUTO: 0.01 K/UL
BASOPHILS NFR BLD: 0.1 %
BILIRUB SERPL-MCNC: 1.2 MG/DL
BNP SERPL-MCNC: 310 PG/ML
BUN SERPL-MCNC: 19 MG/DL
CALCIUM SERPL-MCNC: 9.4 MG/DL
CHLORIDE SERPL-SCNC: 102 MMOL/L
CO2 SERPL-SCNC: 22 MMOL/L
CREAT SERPL-MCNC: 1.1 MG/DL
DIFFERENTIAL METHOD: ABNORMAL
EOSINOPHIL # BLD AUTO: 0 K/UL
EOSINOPHIL NFR BLD: 0 %
ERYTHROCYTE [DISTWIDTH] IN BLOOD BY AUTOMATED COUNT: 15.6 %
EST. GFR  (AFRICAN AMERICAN): >60 ML/MIN/1.73 M^2
EST. GFR  (NON AFRICAN AMERICAN): 58 ML/MIN/1.73 M^2
GLUCOSE SERPL-MCNC: 123 MG/DL
HCT VFR BLD AUTO: 44.7 %
HGB BLD-MCNC: 15 G/DL
LYMPHOCYTES # BLD AUTO: 1.1 K/UL
LYMPHOCYTES NFR BLD: 8.1 %
MCH RBC QN AUTO: 29.6 PG
MCHC RBC AUTO-ENTMCNC: 33.6 G/DL
MCV RBC AUTO: 88 FL
MONOCYTES # BLD AUTO: 0.9 K/UL
MONOCYTES NFR BLD: 6.6 %
NEUTROPHILS # BLD AUTO: 11.3 K/UL
NEUTROPHILS NFR BLD: 85.2 %
PLATELET # BLD AUTO: 247 K/UL
PMV BLD AUTO: 9.3 FL
POTASSIUM SERPL-SCNC: 4.6 MMOL/L
PROT SERPL-MCNC: 6.7 G/DL
RBC # BLD AUTO: 5.07 M/UL
SODIUM SERPL-SCNC: 135 MMOL/L
TROPONIN I SERPL DL<=0.01 NG/ML-MCNC: 0.02 NG/ML
WBC # BLD AUTO: 13.3 K/UL

## 2017-11-17 PROCEDURE — 36415 COLL VENOUS BLD VENIPUNCTURE: CPT

## 2017-11-17 PROCEDURE — 93010 ELECTROCARDIOGRAM REPORT: CPT | Mod: ,,, | Performed by: INTERNAL MEDICINE

## 2017-11-17 PROCEDURE — 99285 EMERGENCY DEPT VISIT HI MDM: CPT | Mod: 25,27

## 2017-11-17 PROCEDURE — 63600175 PHARM REV CODE 636 W HCPCS: Performed by: EMERGENCY MEDICINE

## 2017-11-17 PROCEDURE — 99214 OFFICE O/P EST MOD 30 MIN: CPT | Mod: S$PBB | Performed by: FAMILY MEDICINE

## 2017-11-17 PROCEDURE — 99999 PR STA SHADOW: CPT | Mod: PBBFAC,,, | Performed by: FAMILY MEDICINE

## 2017-11-17 PROCEDURE — 99213 OFFICE O/P EST LOW 20 MIN: CPT | Mod: PBBFAC | Performed by: FAMILY MEDICINE

## 2017-11-17 PROCEDURE — 96374 THER/PROPH/DIAG INJ IV PUSH: CPT

## 2017-11-17 PROCEDURE — 99999 PR PBB SHADOW E&M-EST. PATIENT-LVL III: CPT | Mod: PBBFAC,,, | Performed by: FAMILY MEDICINE

## 2017-11-17 PROCEDURE — 84484 ASSAY OF TROPONIN QUANT: CPT

## 2017-11-17 PROCEDURE — 80053 COMPREHEN METABOLIC PANEL: CPT

## 2017-11-17 PROCEDURE — 85025 COMPLETE CBC W/AUTO DIFF WBC: CPT

## 2017-11-17 PROCEDURE — 83880 ASSAY OF NATRIURETIC PEPTIDE: CPT

## 2017-11-17 PROCEDURE — 93005 ELECTROCARDIOGRAM TRACING: CPT

## 2017-11-17 RX ORDER — LATANOPROST 50 UG/ML
SOLUTION/ DROPS OPHTHALMIC
Qty: 2.5 ML | Refills: 0 | Status: SHIPPED | OUTPATIENT
Start: 2017-11-17 | End: 2017-12-26 | Stop reason: SDUPTHER

## 2017-11-17 RX ORDER — FUROSEMIDE 10 MG/ML
20 INJECTION INTRAMUSCULAR; INTRAVENOUS
Status: COMPLETED | OUTPATIENT
Start: 2017-11-17 | End: 2017-11-17

## 2017-11-17 RX ORDER — ALBUTEROL SULFATE 2.5 MG/.5ML
2.5 SOLUTION RESPIRATORY (INHALATION) EVERY 4 HOURS PRN
Status: DISCONTINUED | OUTPATIENT
Start: 2017-11-18 | End: 2017-11-18 | Stop reason: HOSPADM

## 2017-11-17 RX ADMIN — FUROSEMIDE 20 MG: 10 INJECTION, SOLUTION INTRAMUSCULAR; INTRAVENOUS at 11:11

## 2017-11-17 NOTE — PROGRESS NOTES
Subjective:       Patient ID: See Leo is a 89 y.o. male.    Chief Complaint: COPD (3m follow up)    Pt is a 89 y.o. male who presents for evaluation and management of   Encounter Diagnoses   Name Primary?    Pulmonary emphysema, unspecified emphysema type Yes    Dyslipidemia     Chronic atrial fibrillation     Systolic heart failure secondary to coronary artery disease     Dementia without behavioral disturbance, unspecified dementia type    .Breathing is about the same   Can walk about 40-50 yards or so before SOB     Doing well on current meds. Denies any side effects. Prevention is up to date.  Review of Systems   Constitutional: Negative for activity change, chills, fatigue, fever and unexpected weight change.   HENT: Negative for sore throat and trouble swallowing.    Respiratory: Positive for cough, shortness of breath and wheezing. Negative for chest tightness.    Cardiovascular: Negative for chest pain, palpitations and leg swelling.   Gastrointestinal: Negative for abdominal pain.   Endocrine: Negative for cold intolerance and heat intolerance.   Genitourinary: Negative for difficulty urinating.   Musculoskeletal: Negative for back pain and joint swelling.   Skin: Negative for rash.   Neurological: Negative for numbness.   Hematological: Negative for adenopathy.   Psychiatric/Behavioral: Negative for decreased concentration.       Objective:      Physical Exam   Constitutional: He is oriented to person, place, and time. He appears well-developed and well-nourished.   HENT:   Head: Normocephalic and atraumatic.   Right Ear: External ear normal.   Left Ear: External ear normal.   Nose: Nose normal.   Mouth/Throat: Oropharynx is clear and moist.   Eyes: Conjunctivae and EOM are normal. Pupils are equal, round, and reactive to light. Right eye exhibits no discharge. Left eye exhibits no discharge. No scleral icterus.   Neck: Normal range of motion. Neck supple. No JVD present. No tracheal deviation  present. No thyromegaly present.   Cardiovascular: Normal rate, regular rhythm, normal heart sounds and intact distal pulses.    No murmur heard.  Pulmonary/Chest: Effort normal and breath sounds normal. No respiratory distress. He has no wheezes. He has no rales. He exhibits no tenderness.   Exp wheezes    Abdominal: Soft. Bowel sounds are normal. He exhibits no distension and no mass. There is no tenderness. There is no rebound and no guarding.   Musculoskeletal: Normal range of motion.   Lymphadenopathy:     He has no cervical adenopathy.   Neurological: He is alert and oriented to person, place, and time. He has normal reflexes. He displays normal reflexes. No cranial nerve deficit. He exhibits normal muscle tone. Coordination normal.   Skin: Skin is warm and dry.   Psychiatric: He has a normal mood and affect. His behavior is normal. Judgment and thought content normal.       CMP  Sodium   Date Value Ref Range Status   10/21/2017 137 136 - 145 mmol/L Final     Potassium   Date Value Ref Range Status   10/21/2017 4.2 3.5 - 5.1 mmol/L Final     Chloride   Date Value Ref Range Status   10/21/2017 103 95 - 110 mmol/L Final     CO2   Date Value Ref Range Status   10/21/2017 25 23 - 29 mmol/L Final     Glucose   Date Value Ref Range Status   10/21/2017 94 70 - 110 mg/dL Final     BUN, Bld   Date Value Ref Range Status   10/21/2017 14 10 - 30 mg/dL Final     Creatinine   Date Value Ref Range Status   10/21/2017 0.9 0.5 - 1.4 mg/dL Final     Calcium   Date Value Ref Range Status   10/21/2017 9.2 8.7 - 10.5 mg/dL Final     Total Protein   Date Value Ref Range Status   10/21/2017 6.8 6.0 - 8.4 g/dL Final     Albumin   Date Value Ref Range Status   10/21/2017 2.8 (L) 3.5 - 5.2 g/dL Final     Total Bilirubin   Date Value Ref Range Status   10/21/2017 0.8 0.1 - 1.0 mg/dL Final     Comment:     For infants and newborns, interpretation of results should be based  on gestational age, weight and in agreement with  clinical  observations.  Premature Infant recommended reference ranges:  Up to 24 hours.............<8.0 mg/dL  Up to 48 hours............<12.0 mg/dL  3-5 days..................<15.0 mg/dL  6-29 days.................<15.0 mg/dL       Alkaline Phosphatase   Date Value Ref Range Status   10/21/2017 98 55 - 135 U/L Final     AST   Date Value Ref Range Status   10/21/2017 12 10 - 40 U/L Final     ALT   Date Value Ref Range Status   10/21/2017 11 10 - 44 U/L Final     Anion Gap   Date Value Ref Range Status   10/21/2017 9 8 - 16 mmol/L Final     eGFR if    Date Value Ref Range Status   10/21/2017 >60 >60 mL/min/1.73 m^2 Final     eGFR if non    Date Value Ref Range Status   10/21/2017 >60 >60 mL/min/1.73 m^2 Final     Comment:     Calculation used to obtain the estimated glomerular filtration  rate (eGFR) is the CKD-EPI equation. Since race is unknown   in our information system, the eGFR values for   -American and Non--American patients are given   for each creatinine result.     \    Assessment:       1. Pulmonary emphysema, unspecified emphysema type    2. Dyslipidemia    3. Chronic atrial fibrillation    4. Systolic heart failure secondary to coronary artery disease    5. Dementia without behavioral disturbance, unspecified dementia type        Plan:   See was seen today for copd.    Diagnoses and all orders for this visit:    Pulmonary emphysema, unspecified emphysema type    Dyslipidemia    Chronic atrial fibrillation    Systolic heart failure secondary to coronary artery disease    Dementia without behavioral disturbance, unspecified dementia type    continue pulmicort BID   anoro daily  duoneb prn   Continue plavix, ASA, statin, ARB, BB  Follow with dR. Anderson who checks his lipids  No labs needed today     RTC 6 months     No Follow-up on file.

## 2017-11-18 ENCOUNTER — TELEPHONE (OUTPATIENT)
Dept: FAMILY MEDICINE | Facility: CLINIC | Age: 82
End: 2017-11-18

## 2017-11-18 VITALS
RESPIRATION RATE: 19 BRPM | HEART RATE: 71 BPM | DIASTOLIC BLOOD PRESSURE: 74 MMHG | BODY MASS INDEX: 24.05 KG/M2 | HEIGHT: 70 IN | SYSTOLIC BLOOD PRESSURE: 122 MMHG | OXYGEN SATURATION: 94 % | TEMPERATURE: 98 F | WEIGHT: 168 LBS

## 2017-11-18 DIAGNOSIS — J44.9 CHRONIC OBSTRUCTIVE PULMONARY DISEASE, UNSPECIFIED COPD TYPE: ICD-10-CM

## 2017-11-18 DIAGNOSIS — I50.22 CHRONIC SYSTOLIC HEART FAILURE: Primary | ICD-10-CM

## 2017-11-18 PROCEDURE — 94760 N-INVAS EAR/PLS OXIMETRY 1: CPT

## 2017-11-18 PROCEDURE — 94640 AIRWAY INHALATION TREATMENT: CPT

## 2017-11-18 PROCEDURE — 25000242 PHARM REV CODE 250 ALT 637 W/ HCPCS: Performed by: EMERGENCY MEDICINE

## 2017-11-18 RX ADMIN — ALBUTEROL SULFATE 2.5 MG: 2.5 SOLUTION RESPIRATORY (INHALATION) at 12:11

## 2017-11-18 NOTE — ED PROVIDER NOTES
Encounter Date: 11/17/2017       History     Chief Complaint   Patient presents with    Shortness of Breath     This 89-year-old man was brought to the emergency room by ambulance after his wife called them.  She said she wouldn't listen to his breathing all night.  When the ambulance crew arrived is home oxygen was lying on the recliner instead of on his nostrils.  He is cooperative and in alert and oriented ×3.  He has some mild to moderate dementia.  He denies any pain or shortness of breath at this time.          Review of patient's allergies indicates:  No Known Allergies  Past Medical History:   Diagnosis Date    CAD (coronary artery disease), native coronary artery     Cardiomyopathy     Carotid artery disease     COPD (chronic obstructive pulmonary disease)     Dyslipidemia     Encounter for blood transfusion     Esophageal reflux     GERD (gastroesophageal reflux disease)     GI hemorrhage 10/08/2015    Heart failure     HTN (hypertension), benign     Left ventricular systolic dysfunction     Paroxysmal a-fib     S/P CABG x 4      Past Surgical History:   Procedure Laterality Date    CORONARY ARTERY BYPASS GRAFT      x4    HIP SURGERY      LEG SURGERY      TONSILLECTOMY       No family history on file.  Social History   Substance Use Topics    Smoking status: Former Smoker     Packs/day: 2.00     Years: 20.00     Quit date: 1/1/1970    Smokeless tobacco: Never Used    Alcohol use Yes      Comment: once in a while     Review of Systems   Constitutional: Negative for fever.   Respiratory: Positive for cough and shortness of breath.    All other systems reviewed and are negative.      Physical Exam     Initial Vitals   BP Pulse Resp Temp SpO2   -- -- -- -- --      MAP       --         Physical Exam    Nursing note and vitals reviewed.  Constitutional: He appears well-developed.   HENT:   Head: Normocephalic and atraumatic.   Mouth/Throat: Oropharynx is clear and moist.   Eyes:  Conjunctivae are normal. Pupils are equal, round, and reactive to light.   Neck: Normal range of motion. Neck supple.   Cardiovascular: Normal rate and regular rhythm.   Pulmonary/Chest: No respiratory distress. He has wheezes. He has rhonchi.   Abdominal: Soft. Bowel sounds are normal.   Musculoskeletal: Normal range of motion. He exhibits no edema.   Neurological: He is alert and oriented to person, place, and time.   Skin: Skin is warm. Capillary refill takes less than 2 seconds.   Psychiatric: He has a normal mood and affect.         ED Course   Procedures  Labs Reviewed - No data to display  EKG Readings: (Independently Interpreted)   Initial Reading: No STEMI. Rhythm: Paced Rhythm. Heart Rate: 71.                            ED Course      Clinical Impression:   The encounter diagnosis was CHF (congestive heart failure), NYHA class I, chronic, combined.    Disposition:   Disposition: Discharged  Condition: Stable                        Ryan James MD  11/17/17 4676       Ryan James MD  11/18/17 0183

## 2017-11-18 NOTE — TELEPHONE ENCOUNTER
----- Message from Chuckie Horta sent at 2017  9:25 AM CST -----  Contact: Wife - Sury Leo  MRN: [id number]  : 1925  PCP: Suhail Gonzalez  Home Phone      [phone]  Work Phone      Not on file.  Mobile          [phone]      MESSAGE: seen here yesterday by Dr Gonzalez -- taken to ER last night -- ER doc recommends home health service -- please advise    Call [phone]    PCP: Lisa

## 2017-11-18 NOTE — ED TRIAGE NOTES
C/O SOB this evening, last breathing treatment this AM.  Wears oxygen at night.  Initial O2 sat 88% per EMS, 2 nebs given en route, solumedrol 125 mg, IV 18 g L FA PTA.

## 2017-11-19 NOTE — PROVIDER PROGRESS NOTES - EMERGENCY DEPT.
Encounter Date: 11/17/2017    ED Physician Progress Notes           I called the patient's family regarding the possible pneumonia on chest x-ray  I spoke to the patient's son, states the patient is doing much better today  Son understands the patient probably has pneumonia, we'll prescribe antibiotics  I called in some [facility] to local pharmacy, patient will start antibiotics tomorrow  Patient's son agrees to start antibiotics and call PCP on Monday       Jeb Chávez M.D. 9:19 PM 11/18/2017

## 2017-11-20 ENCOUNTER — TELEPHONE (OUTPATIENT)
Dept: FAMILY MEDICINE | Facility: CLINIC | Age: 82
End: 2017-11-20

## 2017-11-20 NOTE — TELEPHONE ENCOUNTER
----- Message from Joel Montana sent at 2017 10:04 AM CST -----  Contact: ALEXUS / WIFE   See Leo  MRN: [id number]  : 1925  PCP: Suhail Gonzalez  Home Phone      [phone]  Work Phone      Not on file.  Mobile          [phone]      MESSAGE: STATED THAT PT NEEDS TO SEE DR GONZALEZ TODAY FOR ER FOLLOW UP. SHE SAID HE WAS SEEN FOR CONGESTIVE HEART FAILURE ON SATURDAY. PLEASE CALL     PHONE: [phone]

## 2017-11-20 NOTE — TELEPHONE ENCOUNTER
----- Message from Joel Montana sent at 2017 10:04 AM CST -----  Contact: ALEXUS / WIFE   See Leo  MRN: [id number]  : 1925  PCP: Suhail Gonzalez  Home Phone      [phone]  Work Phone      Not on file.  Mobile          [phone]      MESSAGE: STATED THAT PT NEEDS TO SEE DR GONZALEZ TODAY FOR ER FOLLOW UP. SHE SAID HE WAS SEEN FOR CONGESTIVE HEART FAILURE ON SATURDAY. PLEASE CALL     PHONE: [phone]

## 2017-11-20 NOTE — TELEPHONE ENCOUNTER
----- Message from Chuckie Horta sent at 2017 11:11 AM CST -----  Contact: Son - Moisés Leo  MRN: [id number]  : 1925  PCP: Suhail Gonzalez  Home Phone      [phone]  Work Phone      Not on file.  Mobile          [phone]      MESSAGE: patient was seen here on Friday -- ER Friday night (pneumonia) -- called requesting Home Health -- [facility] home health saw patient yesterday & is scheduled to return tomorrow -- wife states patient was to see Dr Gonzalez for f/u also -- son wants to know if appt is necessary -- patient is on antibiotics -- please advise    Call Moisés @ [phone]    PCP: Lisa

## 2017-11-24 NOTE — TELEPHONE ENCOUNTER
Spoke with Sera with [facility] and she stated that he is admitted with them.

## 2017-12-07 ENCOUNTER — TELEPHONE (OUTPATIENT)
Dept: FAMILY MEDICINE | Facility: CLINIC | Age: 82
End: 2017-12-07

## 2017-12-07 NOTE — TELEPHONE ENCOUNTER
Spoke to Carol with [facility] home [facility]. She needed some clarification on how pt should take Zetia. She stated in their records it stated for pt to take 1 BID, but pts wife has only been giving pt a half of a pill every day.  Informed carol that in our records it shows that Dr. manrique ordered it as for pt to take one 10mg tablet once by mouth daily. Verbalized understanding

## 2017-12-07 NOTE — TELEPHONE ENCOUNTER
----- Message from Niki Dugan sent at 2017 10:20 AM CST -----  Contact: Mindy/[facility]  See Leo  MRN: [id number]  : 1925  PCP: Suhail Gonzalez  Home Phone      [phone]  Work Phone      Not on file.  Mobile          [phone]    MESSAGE:   Would like to get a clarification on medication RX ZETIA 10 mg tablet.  She wants to make sure he is taking it as he is supposed to. Please call.    Phone:  [phone]

## 2017-12-26 DIAGNOSIS — J44.1 CHRONIC OBSTRUCTIVE PULMONARY DISEASE WITH ACUTE EXACERBATION: ICD-10-CM

## 2017-12-27 RX ORDER — BUDESONIDE 0.5 MG/2ML
0.5 INHALANT ORAL 2 TIMES DAILY
Qty: 60 ML | Refills: 5 | Status: ON HOLD | OUTPATIENT
Start: 2017-12-27 | End: 2018-03-22 | Stop reason: HOSPADM

## 2017-12-27 RX ORDER — LATANOPROST 50 UG/ML
SOLUTION/ DROPS OPHTHALMIC
Qty: 2.5 ML | Refills: 0 | Status: SHIPPED | OUTPATIENT
Start: 2017-12-27 | End: 2018-01-22 | Stop reason: SDUPTHER

## 2018-01-10 ENCOUNTER — TELEPHONE (OUTPATIENT)
Dept: FAMILY MEDICINE | Facility: CLINIC | Age: 83
End: 2018-01-10

## 2018-01-10 ENCOUNTER — HOSPITAL ENCOUNTER (OUTPATIENT)
Facility: HOSPITAL | Age: 83
Discharge: HOME | End: 2018-01-11
Attending: EMERGENCY MEDICINE | Admitting: FAMILY MEDICINE
Payer: MEDICARE

## 2018-01-10 DIAGNOSIS — R07.9 ACUTE CHEST PAIN: Primary | ICD-10-CM

## 2018-01-10 DIAGNOSIS — J98.01 BRONCHOSPASM, ACUTE: ICD-10-CM

## 2018-01-10 DIAGNOSIS — R05.9 COUGH: ICD-10-CM

## 2018-01-10 DIAGNOSIS — R79.89 ELEVATED TROPONIN: ICD-10-CM

## 2018-01-10 PROBLEM — J43.9 EMPHYSEMA/COPD: Status: ACTIVE | Noted: 2018-01-10

## 2018-01-10 LAB
ALBUMIN SERPL BCP-MCNC: 2.7 G/DL
ALP SERPL-CCNC: 95 U/L
ALT SERPL W/O P-5'-P-CCNC: 15 U/L
ANION GAP SERPL CALC-SCNC: 7 MMOL/L
APTT BLDCRRT: 26.4 SEC
AST SERPL-CCNC: 12 U/L
BASOPHILS # BLD AUTO: 0.01 K/UL
BASOPHILS NFR BLD: 0.1 %
BILIRUB SERPL-MCNC: 0.9 MG/DL
BNP SERPL-MCNC: 326 PG/ML
BUN SERPL-MCNC: 16 MG/DL
CALCIUM SERPL-MCNC: 9.1 MG/DL
CHLORIDE SERPL-SCNC: 104 MMOL/L
CO2 SERPL-SCNC: 25 MMOL/L
CREAT SERPL-MCNC: 0.8 MG/DL
DIFFERENTIAL METHOD: ABNORMAL
EOSINOPHIL # BLD AUTO: 0 K/UL
EOSINOPHIL NFR BLD: 0.4 %
ERYTHROCYTE [DISTWIDTH] IN BLOOD BY AUTOMATED COUNT: 16 %
EST. GFR  (AFRICAN AMERICAN): >60 ML/MIN/1.73 M^2
EST. GFR  (NON AFRICAN AMERICAN): >60 ML/MIN/1.73 M^2
GLUCOSE SERPL-MCNC: 99 MG/DL
HCT VFR BLD AUTO: 41.4 %
HGB BLD-MCNC: 13.7 G/DL
INR PPP: 1.1
LYMPHOCYTES # BLD AUTO: 0.9 K/UL
LYMPHOCYTES NFR BLD: 10.4 %
MAGNESIUM SERPL-MCNC: 1.6 MG/DL
MCH RBC QN AUTO: 29.5 PG
MCHC RBC AUTO-ENTMCNC: 33.1 G/DL
MCV RBC AUTO: 89 FL
MONOCYTES # BLD AUTO: 0.8 K/UL
MONOCYTES NFR BLD: 8.4 %
NEUTROPHILS # BLD AUTO: 7.3 K/UL
NEUTROPHILS NFR BLD: 80.7 %
PLATELET # BLD AUTO: 238 K/UL
PMV BLD AUTO: 9.4 FL
POTASSIUM SERPL-SCNC: 4.4 MMOL/L
PROT SERPL-MCNC: 6.2 G/DL
PROTHROMBIN TIME: 11.6 SEC
RBC # BLD AUTO: 4.64 M/UL
SODIUM SERPL-SCNC: 136 MMOL/L
TROPONIN I SERPL DL<=0.01 NG/ML-MCNC: 0.02 NG/ML
TROPONIN I SERPL DL<=0.01 NG/ML-MCNC: 0.03 NG/ML
WBC # BLD AUTO: 9.04 K/UL

## 2018-01-10 PROCEDURE — 83735 ASSAY OF MAGNESIUM: CPT

## 2018-01-10 PROCEDURE — 84484 ASSAY OF TROPONIN QUANT: CPT | Mod: 91

## 2018-01-10 PROCEDURE — 80053 COMPREHEN METABOLIC PANEL: CPT

## 2018-01-10 PROCEDURE — 94761 N-INVAS EAR/PLS OXIMETRY MLT: CPT

## 2018-01-10 PROCEDURE — 85730 THROMBOPLASTIN TIME PARTIAL: CPT

## 2018-01-10 PROCEDURE — 63600175 PHARM REV CODE 636 W HCPCS: Performed by: NURSE PRACTITIONER

## 2018-01-10 PROCEDURE — G0378 HOSPITAL OBSERVATION PER HR: HCPCS

## 2018-01-10 PROCEDURE — 83880 ASSAY OF NATRIURETIC PEPTIDE: CPT

## 2018-01-10 PROCEDURE — 36415 COLL VENOUS BLD VENIPUNCTURE: CPT

## 2018-01-10 PROCEDURE — 85610 PROTHROMBIN TIME: CPT

## 2018-01-10 PROCEDURE — 63600175 PHARM REV CODE 636 W HCPCS: Performed by: EMERGENCY MEDICINE

## 2018-01-10 PROCEDURE — 93005 ELECTROCARDIOGRAM TRACING: CPT

## 2018-01-10 PROCEDURE — 93010 ELECTROCARDIOGRAM REPORT: CPT | Mod: ,,, | Performed by: INTERNAL MEDICINE

## 2018-01-10 PROCEDURE — 25000003 PHARM REV CODE 250: Performed by: EMERGENCY MEDICINE

## 2018-01-10 PROCEDURE — 27000221 HC OXYGEN, UP TO 24 HOURS

## 2018-01-10 PROCEDURE — 99285 EMERGENCY DEPT VISIT HI MDM: CPT | Mod: 25

## 2018-01-10 PROCEDURE — 96374 THER/PROPH/DIAG INJ IV PUSH: CPT

## 2018-01-10 PROCEDURE — 94640 AIRWAY INHALATION TREATMENT: CPT

## 2018-01-10 PROCEDURE — 99220 PR INITIAL OBSERVATION CARE,LEVL III: CPT | Mod: ,,, | Performed by: FAMILY MEDICINE

## 2018-01-10 PROCEDURE — 25000242 PHARM REV CODE 250 ALT 637 W/ HCPCS: Performed by: EMERGENCY MEDICINE

## 2018-01-10 PROCEDURE — 25000242 PHARM REV CODE 250 ALT 637 W/ HCPCS: Performed by: FAMILY MEDICINE

## 2018-01-10 PROCEDURE — 96372 THER/PROPH/DIAG INJ SC/IM: CPT | Mod: 59

## 2018-01-10 PROCEDURE — 85025 COMPLETE CBC W/AUTO DIFF WBC: CPT

## 2018-01-10 PROCEDURE — 84484 ASSAY OF TROPONIN QUANT: CPT

## 2018-01-10 RX ORDER — METOPROLOL TARTRATE 25 MG/1
25 TABLET, FILM COATED ORAL 2 TIMES DAILY
Status: DISCONTINUED | OUTPATIENT
Start: 2018-01-10 | End: 2018-01-11 | Stop reason: HOSPADM

## 2018-01-10 RX ORDER — ASPIRIN 81 MG/1
81 TABLET ORAL DAILY
Status: DISCONTINUED | OUTPATIENT
Start: 2018-01-11 | End: 2018-01-11 | Stop reason: HOSPADM

## 2018-01-10 RX ORDER — METOPROLOL TARTRATE 25 MG/1
25 TABLET, FILM COATED ORAL 2 TIMES DAILY
Status: DISCONTINUED | OUTPATIENT
Start: 2018-01-10 | End: 2018-01-10

## 2018-01-10 RX ORDER — VALSARTAN 40 MG/1
40 TABLET ORAL DAILY
Status: DISCONTINUED | OUTPATIENT
Start: 2018-01-11 | End: 2018-01-10

## 2018-01-10 RX ORDER — EZETIMIBE 10 MG/1
10 TABLET ORAL NIGHTLY
Status: DISCONTINUED | OUTPATIENT
Start: 2018-01-10 | End: 2018-01-10

## 2018-01-10 RX ORDER — ATORVASTATIN CALCIUM 40 MG/1
40 TABLET, FILM COATED ORAL DAILY
Status: DISCONTINUED | OUTPATIENT
Start: 2018-01-11 | End: 2018-01-11 | Stop reason: HOSPADM

## 2018-01-10 RX ORDER — ONDANSETRON 2 MG/ML
4 INJECTION INTRAMUSCULAR; INTRAVENOUS EVERY 8 HOURS PRN
Status: DISCONTINUED | OUTPATIENT
Start: 2018-01-10 | End: 2018-01-11 | Stop reason: HOSPADM

## 2018-01-10 RX ORDER — MEMANTINE HYDROCHLORIDE 10 MG/1
10 TABLET ORAL 2 TIMES DAILY
Status: DISCONTINUED | OUTPATIENT
Start: 2018-01-10 | End: 2018-01-11 | Stop reason: HOSPADM

## 2018-01-10 RX ORDER — FUROSEMIDE 10 MG/ML
20 INJECTION INTRAMUSCULAR; INTRAVENOUS 2 TIMES DAILY
Status: DISCONTINUED | OUTPATIENT
Start: 2018-01-10 | End: 2018-01-10

## 2018-01-10 RX ORDER — TAMSULOSIN HYDROCHLORIDE 0.4 MG/1
0.4 CAPSULE ORAL DAILY
Status: DISCONTINUED | OUTPATIENT
Start: 2018-01-11 | End: 2018-01-11 | Stop reason: HOSPADM

## 2018-01-10 RX ORDER — CLOPIDOGREL BISULFATE 75 MG/1
75 TABLET ORAL DAILY
Status: DISCONTINUED | OUTPATIENT
Start: 2018-01-11 | End: 2018-01-11 | Stop reason: HOSPADM

## 2018-01-10 RX ORDER — RANOLAZINE 500 MG/1
1000 TABLET, EXTENDED RELEASE ORAL 2 TIMES DAILY
Status: DISCONTINUED | OUTPATIENT
Start: 2018-01-10 | End: 2018-01-10

## 2018-01-10 RX ORDER — SPIRONOLACTONE 25 MG/1
50 TABLET ORAL DAILY
Status: DISCONTINUED | OUTPATIENT
Start: 2018-01-11 | End: 2018-01-11 | Stop reason: HOSPADM

## 2018-01-10 RX ORDER — ATORVASTATIN CALCIUM 40 MG/1
40 TABLET, FILM COATED ORAL DAILY
Status: DISCONTINUED | OUTPATIENT
Start: 2018-01-11 | End: 2018-01-10

## 2018-01-10 RX ORDER — RANOLAZINE 500 MG/1
1000 TABLET, EXTENDED RELEASE ORAL 2 TIMES DAILY
Status: DISCONTINUED | OUTPATIENT
Start: 2018-01-10 | End: 2018-01-11 | Stop reason: HOSPADM

## 2018-01-10 RX ORDER — EZETIMIBE 10 MG/1
10 TABLET ORAL DAILY
Status: DISCONTINUED | OUTPATIENT
Start: 2018-01-11 | End: 2018-01-11 | Stop reason: HOSPADM

## 2018-01-10 RX ORDER — POLYETHYLENE GLYCOL 3350 17 G/17G
17 POWDER, FOR SOLUTION ORAL DAILY
Status: DISCONTINUED | OUTPATIENT
Start: 2018-01-11 | End: 2018-01-11 | Stop reason: HOSPADM

## 2018-01-10 RX ORDER — LATANOPROST 50 UG/ML
1 SOLUTION/ DROPS OPHTHALMIC NIGHTLY
Status: DISCONTINUED | OUTPATIENT
Start: 2018-01-10 | End: 2018-01-11 | Stop reason: HOSPADM

## 2018-01-10 RX ORDER — IPRATROPIUM BROMIDE AND ALBUTEROL SULFATE 2.5; .5 MG/3ML; MG/3ML
3 SOLUTION RESPIRATORY (INHALATION) EVERY 4 HOURS
Status: DISCONTINUED | OUTPATIENT
Start: 2018-01-10 | End: 2018-01-11 | Stop reason: HOSPADM

## 2018-01-10 RX ORDER — VALSARTAN 40 MG/1
40 TABLET ORAL DAILY
Status: DISCONTINUED | OUTPATIENT
Start: 2018-01-11 | End: 2018-01-11 | Stop reason: HOSPADM

## 2018-01-10 RX ORDER — ASPIRIN 81 MG/1
81 TABLET ORAL DAILY
Status: DISCONTINUED | OUTPATIENT
Start: 2018-01-11 | End: 2018-01-10

## 2018-01-10 RX ORDER — BUDESONIDE 0.5 MG/2ML
0.5 INHALANT ORAL EVERY 12 HOURS
Status: DISCONTINUED | OUTPATIENT
Start: 2018-01-10 | End: 2018-01-11 | Stop reason: HOSPADM

## 2018-01-10 RX ORDER — BUDESONIDE 0.5 MG/2ML
0.5 INHALANT ORAL 2 TIMES DAILY
Status: DISCONTINUED | OUTPATIENT
Start: 2018-01-10 | End: 2018-01-10

## 2018-01-10 RX ORDER — BENZONATATE 100 MG/1
100 CAPSULE ORAL 3 TIMES DAILY PRN
Status: DISCONTINUED | OUTPATIENT
Start: 2018-01-10 | End: 2018-01-11 | Stop reason: HOSPADM

## 2018-01-10 RX ORDER — IPRATROPIUM BROMIDE AND ALBUTEROL SULFATE 2.5; .5 MG/3ML; MG/3ML
3 SOLUTION RESPIRATORY (INHALATION)
Status: COMPLETED | OUTPATIENT
Start: 2018-01-10 | End: 2018-01-10

## 2018-01-10 RX ORDER — HYDROCODONE BITARTRATE AND ACETAMINOPHEN 5; 325 MG/1; MG/1
1 TABLET ORAL EVERY 4 HOURS PRN
Status: DISCONTINUED | OUTPATIENT
Start: 2018-01-10 | End: 2018-01-11 | Stop reason: HOSPADM

## 2018-01-10 RX ORDER — SPIRONOLACTONE 25 MG/1
50 TABLET ORAL DAILY
Status: DISCONTINUED | OUTPATIENT
Start: 2018-01-11 | End: 2018-01-10

## 2018-01-10 RX ORDER — ALBUTEROL SULFATE 2.5 MG/.5ML
2.5 SOLUTION RESPIRATORY (INHALATION)
Status: COMPLETED | OUTPATIENT
Start: 2018-01-10 | End: 2018-01-10

## 2018-01-10 RX ORDER — ENOXAPARIN SODIUM 100 MG/ML
40 INJECTION SUBCUTANEOUS EVERY 24 HOURS
Status: DISCONTINUED | OUTPATIENT
Start: 2018-01-10 | End: 2018-01-11 | Stop reason: HOSPADM

## 2018-01-10 RX ORDER — DOCUSATE CALCIUM 240 MG
240 CAPSULE ORAL 2 TIMES DAILY
Status: DISCONTINUED | OUTPATIENT
Start: 2018-01-10 | End: 2018-01-11 | Stop reason: HOSPADM

## 2018-01-10 RX ORDER — FUROSEMIDE 10 MG/ML
20 INJECTION INTRAMUSCULAR; INTRAVENOUS 2 TIMES DAILY
Status: DISCONTINUED | OUTPATIENT
Start: 2018-01-10 | End: 2018-01-11

## 2018-01-10 RX ORDER — CLOPIDOGREL BISULFATE 75 MG/1
75 TABLET ORAL DAILY
Status: DISCONTINUED | OUTPATIENT
Start: 2018-01-11 | End: 2018-01-10

## 2018-01-10 RX ADMIN — RANOLAZINE 1000 MG: 500 TABLET, FILM COATED, EXTENDED RELEASE ORAL at 09:01

## 2018-01-10 RX ADMIN — FUROSEMIDE 20 MG: 10 INJECTION, SOLUTION INTRAMUSCULAR; INTRAVENOUS at 05:01

## 2018-01-10 RX ADMIN — BUDESONIDE 0.5 MG: 0.5 INHALANT RESPIRATORY (INHALATION) at 07:01

## 2018-01-10 RX ADMIN — LATANOPROST 1 DROP: 50 SOLUTION OPHTHALMIC at 09:01

## 2018-01-10 RX ADMIN — IPRATROPIUM BROMIDE AND ALBUTEROL SULFATE 3 ML: .5; 3 SOLUTION RESPIRATORY (INHALATION) at 07:01

## 2018-01-10 RX ADMIN — MEMANTINE 10 MG: 10 TABLET ORAL at 09:01

## 2018-01-10 RX ADMIN — IPRATROPIUM BROMIDE AND ALBUTEROL SULFATE 3 ML: .5; 3 SOLUTION RESPIRATORY (INHALATION) at 03:01

## 2018-01-10 RX ADMIN — METOPROLOL TARTRATE 25 MG: 25 TABLET ORAL at 09:01

## 2018-01-10 RX ADMIN — ENOXAPARIN SODIUM 40 MG: 100 INJECTION SUBCUTANEOUS at 06:01

## 2018-01-10 RX ADMIN — DOCUSATE CALCIUM 240 MG: 240 CAPSULE, LIQUID FILLED ORAL at 09:01

## 2018-01-10 RX ADMIN — ALBUTEROL SULFATE 2.5 MG: 2.5 SOLUTION RESPIRATORY (INHALATION) at 03:01

## 2018-01-10 NOTE — ED NOTES
Pt admitted to 3rd floor, transferred via wheel chair by nurse in no distress. VSS. DIETERN. Report given to KARLIE Smith.

## 2018-01-10 NOTE — CONSULTS
[facility]  Cardiology  Consult Note    Patient Name: See Leo  MRN: [id number]  Admission Date: 1/10/2018  Hospital Length of Stay: 0 days  Code Status: Prior   Attending Provider: Frantz Hickey MD   Consulting Provider: Tony Perez NP  Primary Care Physician: Suhail Gonzalez MD  Principal Problem:<principal problem not specified>    Patient information was obtained from patient, past medical records and ER records.     Consults  Subjective:     Chief Complaint:  Cough/CP    HPI:   89 year old male with history of severe 3 vessel CAD post CABG x 3, COPD, chronic bronchitis, O2 dependence, CHF, PAF, PPM, dyslipidemia, carotid artery disease, AICD, presents with c/o dull chest pain associated with cough x 2 weeks, worsened with deep inspiration, not associated with nausea, diaphoresis, weakness, dizziness, non-radiating, rated 5/10 in severity.     Past Medical History:   Diagnosis Date    CAD (coronary artery disease), native coronary artery     Cardiomyopathy     Carotid artery disease     COPD (chronic obstructive pulmonary disease)     Dyslipidemia     Encounter for blood transfusion     Esophageal reflux     GERD (gastroesophageal reflux disease)     GI hemorrhage 10/08/2015    Heart failure     HTN (hypertension), benign     Left ventricular systolic dysfunction     Paroxysmal A-fib     S/P CABG x 4        Past Surgical History:   Procedure Laterality Date    CORONARY ARTERY BYPASS GRAFT      x4    HIP SURGERY      LEG SURGERY      TONSILLECTOMY         Review of patient's allergies indicates:  No Known Allergies    No current facility-administered medications on file prior to encounter.      Current Outpatient Prescriptions on File Prior to Encounter   Medication Sig    albuterol-ipratropium 2.5mg-0.5mg/3mL (DUO-NEB) 0.5 mg-3 mg(2.5 mg base)/3 mL nebulizer solution Take 3 mLs by nebulization every 6 (six) hours as needed for Wheezing. Rescue    aspirin  (ECOTRIN) 81 MG EC tablet Take 81 mg by mouth once daily.    atorvastatin (LIPITOR) 40 MG tablet Take 40 mg by mouth once daily.    b complex vitamins capsule Take 1 capsule by mouth once daily.    budesonide (PULMICORT) 0.5 mg/2 mL nebulizer solution TAKE 2 MLS (0.5 MG TOTAL) BY NEBULIZATION 2 (TWO) TIMES DAILY. CONTROLLER    clopidogrel (PLAVIX) 75 mg tablet Take 75 mg by mouth once daily.    docusate calcium (SURFAK) 240 mg capsule Take 240 mg by mouth 2 (two) times daily.    furosemide (LASIX) 20 MG tablet TAKE 1 TABLET ORALLY ONCE A DAY AS NEEDED. FOR WEIGHT GAIN BY 2 POUNDS    latanoprost 0.005 % ophthalmic solution PLACE 1 DROP INTO BOTH EYES EVERY EVENING.    memantine (NAMENDA) 10 MG Tab TAKE 1 TABLET (10 MG TOTAL) BY MOUTH 2 (TWO) TIMES DAILY.    metoprolol tartrate (LOPRESSOR) 25 MG tablet TAKE 1 TABLET BY MOUTH TWICE A DAY    NITROSTAT 0.4 mg SL tablet Place 0.4 mg under the tongue every 5 (five) minutes as needed.     ranolazine (RANEXA) 500 MG Tb12 Take 1,000 mg by mouth 2 (two) times daily.     spironolactone (ALDACTONE) 50 MG tablet Take 50 mg by mouth once daily.    tamsulosin (FLOMAX) 0.4 mg Cp24 TAKE 1 CAPSULE (0.4 MG TOTAL) BY MOUTH ONCE DAILY.    umeclidinium-vilanterol (ANORO ELLIPTA) 62.5-25 mcg/actuation DsDv Inhale 1 puff into the lungs once daily. Controller    valsartan (DIOVAN) 40 MG tablet Take 40 mg by mouth once daily. Take 1 tablet orally once a day if blood pressure top number foes above 100    VENTOLIN HFA 90 mcg/actuation inhaler INHALE 2 PUFFS INTO THE LUNGS EVERY 6 (SIX) HOURS AS NEEDED FOR WHEEZING.    ZETIA 10 mg tablet Take 10 mg by mouth once daily.     Family History     None        Social History Main Topics    Smoking status: Former Smoker     Packs/day: 2.00     Years: 20.00     Quit date: 1/1/1970    Smokeless tobacco: Never Used    Alcohol use Yes      Comment: once in a while    Drug use: Unknown    Sexual activity: Not on file     Review of  Systems   Constitution: Negative.   HENT: Negative.    Eyes: Negative.    Cardiovascular: Positive for chest pain. Negative for claudication, cyanosis, dyspnea on exertion, irregular heartbeat, leg swelling, near-syncope, orthopnea, palpitations, paroxysmal nocturnal dyspnea and syncope.   Respiratory: Positive for cough, hemoptysis, shortness of breath and sputum production. Negative for sleep disturbances due to breathing, snoring and wheezing.    Endocrine: Negative.    Hematologic/Lymphatic: Negative.    Skin: Negative.    Musculoskeletal: Negative.    Gastrointestinal: Negative.    Genitourinary: Negative.    Neurological: Negative.    Psychiatric/Behavioral: Negative.    Allergic/Immunologic: Negative.      Objective:     Vital Signs (Most Recent):  Temp: 96.6 °F (35.9 °C) (01/10/18 1458)  Pulse: 70 (01/10/18 1554)  Resp: 16 (01/10/18 1554)  BP: 93/63 (01/10/18 1458)  SpO2: 95 % (01/10/18 1554) Vital Signs (24h Range):  Temp:  [96.6 °F (35.9 °C)] 96.6 °F (35.9 °C)  Pulse:  [70-74] 70  Resp:  [16-17] 16  SpO2:  [95 %-97 %] 95 %  BP: (93)/(63) 93/63     Weight: 71.7 kg (158 lb)  Body mass index is 22.67 kg/m².    SpO2: 95 %  O2 Device (Oxygen Therapy): nasal cannula    No intake or output data in the 24 hours ending 01/10/18 1632    Lines/Drains/Airways     Peripheral Intravenous Line                 Peripheral IV - Single Lumen 01/10/18 1531 Right Antecubital less than 1 day                Physical Exam   Constitutional: He is oriented to person, place, and time. He appears well-developed and well-nourished. No distress.   HENT:   Head: Normocephalic and atraumatic.   Neck: Normal range of motion. Neck supple. No JVD present. No tracheal deviation present.   Cardiovascular: Normal rate, regular rhythm and intact distal pulses.    Murmur (2/6 DEMETRIUS) heard.  Pulmonary/Chest: Effort normal. No stridor. He exhibits tenderness.   Coarse BS bilaterally   Abdominal: Soft. Bowel sounds are normal. He exhibits no  distension and no mass. There is no tenderness. There is no rebound and no guarding.   Musculoskeletal: Normal range of motion. He exhibits deformity. He exhibits no edema or tenderness.   Neurological: He is alert and oriented to person, place, and time.   Skin: Skin is warm and dry. No rash noted. He is not diaphoretic. No erythema. No pallor.   Psychiatric: He has a normal mood and affect. His behavior is normal. Judgment and thought content normal.       Significant Labs:   ABG: No results for input(s): PH, PCO2, HCO3, POCSATURATED, BE in the last 48 hours., Blood Culture: No results for input(s): LABBLOO in the last 48 hours., BMP:   Recent Labs  Lab 01/10/18  1529   GLU 99      K 4.4      CO2 25   BUN 16   CREATININE 0.8   CALCIUM 9.1   MG 1.6   , CMP   Recent Labs  Lab 01/10/18  1529      K 4.4      CO2 25   GLU 99   BUN 16   CREATININE 0.8   CALCIUM 9.1   PROT 6.2   ALBUMIN 2.7*   BILITOT 0.9   ALKPHOS 95   AST 12   ALT 15   ANIONGAP 7*   ESTGFRAFRICA >60   EGFRNONAA >60   , CBC   Recent Labs  Lab 01/10/18  1530   WBC 9.04   HGB 13.7*   HCT 41.4      , INR   Recent Labs  Lab 01/10/18  1529   INR 1.1   , Lipid Panel No results for input(s): CHOL, HDL, LDLCALC, TRIG, CHOLHDL in the last 48 hours.,   Pathology Results  (Last 10 years)    None      , Troponin   Recent Labs  Lab 01/10/18  1529   TROPONINI 0.031*   , All pertinent lab results from the last 24 hours have been reviewed. and   Recent Lab Results       01/10/18  1530 01/10/18  1529      Albumin  2.7(L)     Alkaline Phosphatase  95     ALT  15     Anion Gap  7(L)     aPTT  26.4  Comment:  aPTT therapeutic range = 39-69 seconds     AST  12     Baso # 0.01      Basophil% 0.1      Total Bilirubin  0.9  Comment:  For infants and newborns, interpretation of results should be based  on gestational age, weight and in agreement with clinical  observations.  Premature Infant recommended reference ranges:  Up to 24  hours.............<8.0 mg/dL  Up to 48 hours............<12.0 mg/dL  3-5 days..................<15.0 mg/dL  6-29 days.................<15.0 mg/dL         Comment:  Values of less than 100 pg/ml are consistent with non-CHF populations.(H)      BUN, Bld  16     Calcium  9.1     Chloride  104     CO2  25     Creatinine  0.8     Differential Method Automated      eGFR if   >60     eGFR if non   >60  Comment:  Calculation used to obtain the estimated glomerular filtration  rate (eGFR) is the CKD-EPI equation.        Eos # 0.0      Eosinophil% 0.4      Glucose  99     Gran # 7.3      Gran% 80.7(H)      Hematocrit 41.4      Hemoglobin 13.7(L)      Coumadin Monitoring INR  1.1  Comment:  Coumadin Therapy:  2.0 - 3.0 for INR for all indicators except mechanical heart valves  and antiphospholipid syndromes which should use 2.5 - 3.5.       Lymph # 0.9(L)      Lymph% 10.4(L)      Magnesium  1.6     MCH 29.5      MCHC 33.1      MCV 89      Mono # 0.8      Mono% 8.4      MPV 9.4      Platelets 238      Potassium  4.4     Total Protein  6.2     Protime  11.6     RBC 4.64      RDW 16.0(H)      Sodium  136     Troponin I  0.031  Comment:  The reference interval for Troponin I represents the 99th percentile   cutoff   for our facility and is consistent with 3rd generation assay   performance.  (H)     WBC 9.04            Significant Imaging: CT scan: CT ABDOMEN PELVIS WITH CONTRAST: No results found for this visit on 01/10/18. and CT ABDOMEN PELVIS WITHOUT CONTRAST: No results found for this visit on 01/10/18., Echocardiogram: 2D echo with color flow doppler: No results found for this or any previous visit. and X-Ray: CXR: X-Ray Chest 1 View (CXR): No results found for this visit on 01/10/18. and X-Ray Chest PA and Lateral (CXR):   Results for orders placed or performed during the hospital encounter of 01/10/18   X-Ray Chest PA And Lateral    Narrative    Chest, 2 views: Chronic lung markings  are seen bilaterally.  No consolidation or pleural effusion is seen.  The heart is normal in size.  Calcified atheromatous disease affects the aorta.  Left-sided pacemaker devices in place.  Age-appropriate degenerative changes affect the skeleton.    Impression     As above.      Electronically signed by: Eneida Montes De Oca MD  Date:     01/10/18  Time:    15:51     and KUB: X-Ray Abdomen AP 1 View (KUB): No results found for this visit on 01/10/18.    Assessment and Plan:     No notes have been filed under this hospital service.  Service: Cardiology      VTE Risk Mitigation     None        No current facility-administered medications for this encounter.      Current Outpatient Prescriptions   Medication Sig    albuterol-ipratropium 2.5mg-0.5mg/3mL (DUO-NEB) 0.5 mg-3 mg(2.5 mg base)/3 mL nebulizer solution Take 3 mLs by nebulization every 6 (six) hours as needed for Wheezing. Rescue    aspirin (ECOTRIN) 81 MG EC tablet Take 81 mg by mouth once daily.    atorvastatin (LIPITOR) 40 MG tablet Take 40 mg by mouth once daily.    b complex vitamins capsule Take 1 capsule by mouth once daily.    budesonide (PULMICORT) 0.5 mg/2 mL nebulizer solution TAKE 2 MLS (0.5 MG TOTAL) BY NEBULIZATION 2 (TWO) TIMES DAILY. CONTROLLER    clopidogrel (PLAVIX) 75 mg tablet Take 75 mg by mouth once daily.    docusate calcium (SURFAK) 240 mg capsule Take 240 mg by mouth 2 (two) times daily.    furosemide (LASIX) 20 MG tablet TAKE 1 TABLET ORALLY ONCE A DAY AS NEEDED. FOR WEIGHT GAIN BY 2 POUNDS    latanoprost 0.005 % ophthalmic solution PLACE 1 DROP INTO BOTH EYES EVERY EVENING.    memantine (NAMENDA) 10 MG Tab TAKE 1 TABLET (10 MG TOTAL) BY MOUTH 2 (TWO) TIMES DAILY.    metoprolol tartrate (LOPRESSOR) 25 MG tablet TAKE 1 TABLET BY MOUTH TWICE A DAY    NITROSTAT 0.4 mg SL tablet Place 0.4 mg under the tongue every 5 (five) minutes as needed.     ranolazine (RANEXA) 500 MG Tb12 Take 1,000 mg by mouth 2 (two) times daily.      spironolactone (ALDACTONE) 50 MG tablet Take 50 mg by mouth once daily.    tamsulosin (FLOMAX) 0.4 mg Cp24 TAKE 1 CAPSULE (0.4 MG TOTAL) BY MOUTH ONCE DAILY.    umeclidinium-vilanterol (ANORO ELLIPTA) 62.5-25 mcg/actuation DsDv Inhale 1 puff into the lungs once daily. Controller    valsartan (DIOVAN) 40 MG tablet Take 40 mg by mouth once daily. Take 1 tablet orally once a day if blood pressure top number foes above 100    VENTOLIN HFA 90 mcg/actuation inhaler INHALE 2 PUFFS INTO THE LUNGS EVERY 6 (SIX) HOURS AS NEEDED FOR WHEEZING.    ZETIA 10 mg tablet Take 10 mg by mouth once daily.     Echo 9/2016 LVEF 30%, LV diastolic function normal, mild MT, TR, NM, moderate MR, PA systolic pressure 25 mmHg    MPI 7/2016: abnormal. Consistent with mostly scar amd minimal ishemia. Small fixed perfusion abnormality of severe intensity in the apical segment, inferior septal region. Medium fixed perfusion abnormality of severe intensity in the inferior region. Small fixed perfusion abnormality of moderate intensity in the inferior lateral region.    Atypical Chest Pain, likley pleuritic due to COPD with acute bronchitis:          MPI 7/2016: abnormal. Consistent with mostly scar amd minimal ishemia. Small fixed perfusion abnormality of severe intensity in the apical segment, inferior septal region. Medium fixed perfusion abnormality of severe intensity in the inferior region. Small fixed perfusion abnormality of moderate intensity in the inferior lateral region.  Elevated troponin , mildly elevated 0.031 likely supply demand mismath secondary to mild hypoxemia and bronchitis    Wheezing with cough suggestive of bronchitis  CAD s/p CABG. NSTEMI 7/14: unable to revascularize; went on to have CRTD  COPD with Bronchitis  Chronic systolic CHF  PPM/AICD/ ICMO EF 30% with mild AI MR  PAF  Dyslipidemia  Carotid artery disease    Plan: lasix 20 mg bid  Continue metoprolol,valsartan, aldactone  Give breathing treatment      Thank you  for your consult. I will follow-up with patient. Please contact us if you have any additional questions.    Transcribed by  Tony Perez NP for Dr GENNY Anderson  Cardiology   [facility]    I attest that I have personally seen and examined this patient. I have reviewed and discussed the management in detail as outlined above.

## 2018-01-10 NOTE — SUBJECTIVE & OBJECTIVE
Past Medical History:   Diagnosis Date    CAD (coronary artery disease), native coronary artery     Cardiomyopathy     Carotid artery disease     COPD (chronic obstructive pulmonary disease)     Dyslipidemia     Encounter for blood transfusion     Esophageal reflux     GERD (gastroesophageal reflux disease)     GI hemorrhage 10/08/2015    Heart failure     HTN (hypertension), benign     Left ventricular systolic dysfunction     Paroxysmal A-fib     S/P CABG x 4        Past Surgical History:   Procedure Laterality Date    CORONARY ARTERY BYPASS GRAFT      x4    HIP SURGERY      LEG SURGERY      TONSILLECTOMY         Review of patient's allergies indicates:  No Known Allergies    No current facility-administered medications on file prior to encounter.      Current Outpatient Prescriptions on File Prior to Encounter   Medication Sig    albuterol-ipratropium 2.5mg-0.5mg/3mL (DUO-NEB) 0.5 mg-3 mg(2.5 mg base)/3 mL nebulizer solution Take 3 mLs by nebulization every 6 (six) hours as needed for Wheezing. Rescue    aspirin (ECOTRIN) 81 MG EC tablet Take 81 mg by mouth once daily.    atorvastatin (LIPITOR) 40 MG tablet Take 40 mg by mouth once daily.    b complex vitamins capsule Take 1 capsule by mouth once daily.    budesonide (PULMICORT) 0.5 mg/2 mL nebulizer solution TAKE 2 MLS (0.5 MG TOTAL) BY NEBULIZATION 2 (TWO) TIMES DAILY. CONTROLLER    clopidogrel (PLAVIX) 75 mg tablet Take 75 mg by mouth once daily.    docusate calcium (SURFAK) 240 mg capsule Take 240 mg by mouth 2 (two) times daily.    furosemide (LASIX) 20 MG tablet TAKE 1 TABLET ORALLY ONCE A DAY AS NEEDED. FOR WEIGHT GAIN BY 2 POUNDS    latanoprost 0.005 % ophthalmic solution PLACE 1 DROP INTO BOTH EYES EVERY EVENING.    memantine (NAMENDA) 10 MG Tab TAKE 1 TABLET (10 MG TOTAL) BY MOUTH 2 (TWO) TIMES DAILY.    metoprolol tartrate (LOPRESSOR) 25 MG tablet TAKE 1 TABLET BY MOUTH TWICE A DAY    NITROSTAT 0.4 mg SL tablet Place 0.4  mg under the tongue every 5 (five) minutes as needed.     ranolazine (RANEXA) 500 MG Tb12 Take 1,000 mg by mouth 2 (two) times daily.     spironolactone (ALDACTONE) 50 MG tablet Take 50 mg by mouth once daily.    tamsulosin (FLOMAX) 0.4 mg Cp24 TAKE 1 CAPSULE (0.4 MG TOTAL) BY MOUTH ONCE DAILY.    umeclidinium-vilanterol (ANORO ELLIPTA) 62.5-25 mcg/actuation DsDv Inhale 1 puff into the lungs once daily. Controller    valsartan (DIOVAN) 40 MG tablet Take 40 mg by mouth once daily. Take 1 tablet orally once a day if blood pressure top number foes above 100    VENTOLIN HFA 90 mcg/actuation inhaler INHALE 2 PUFFS INTO THE LUNGS EVERY 6 (SIX) HOURS AS NEEDED FOR WHEEZING.    ZETIA 10 mg tablet Take 10 mg by mouth once daily.     Family History     None        Social History Main Topics    Smoking status: Former Smoker     Packs/day: 2.00     Years: 20.00     Quit date: 1/1/1970    Smokeless tobacco: Never Used    Alcohol use Yes      Comment: once in a while    Drug use: Unknown    Sexual activity: Not on file     Review of Systems   Constitution: Negative.   HENT: Negative.    Eyes: Negative.    Cardiovascular: Positive for chest pain. Negative for claudication, cyanosis, dyspnea on exertion, irregular heartbeat, leg swelling, near-syncope, orthopnea, palpitations, paroxysmal nocturnal dyspnea and syncope.   Respiratory: Positive for cough, hemoptysis, shortness of breath and sputum production. Negative for sleep disturbances due to breathing, snoring and wheezing.    Endocrine: Negative.    Hematologic/Lymphatic: Negative.    Skin: Negative.    Musculoskeletal: Negative.    Gastrointestinal: Negative.    Genitourinary: Negative.    Neurological: Negative.    Psychiatric/Behavioral: Negative.    Allergic/Immunologic: Negative.      Objective:     Vital Signs (Most Recent):  Temp: 96.6 °F (35.9 °C) (01/10/18 1458)  Pulse: 70 (01/10/18 1554)  Resp: 16 (01/10/18 1554)  BP: 93/63 (01/10/18 1458)  SpO2: 95 %  (01/10/18 7332) Vital Signs (24h Range):  Temp:  [96.6 °F (35.9 °C)] 96.6 °F (35.9 °C)  Pulse:  [70-74] 70  Resp:  [16-17] 16  SpO2:  [95 %-97 %] 95 %  BP: (93)/(63) 93/63     Weight: 71.7 kg (158 lb)  Body mass index is 22.67 kg/m².    SpO2: 95 %  O2 Device (Oxygen Therapy): nasal cannula    No intake or output data in the 24 hours ending 01/10/18 1632    Lines/Drains/Airways     Peripheral Intravenous Line                 Peripheral IV - Single Lumen 01/10/18 1531 Right Antecubital less than 1 day                Physical Exam   Constitutional: He is oriented to person, place, and time. He appears well-developed and well-nourished. No distress.   HENT:   Head: Normocephalic and atraumatic.   Neck: Normal range of motion. Neck supple. No JVD present. No tracheal deviation present.   Cardiovascular: Normal rate, regular rhythm and intact distal pulses.    Murmur (2/6 DEMETRIUS) heard.  Pulmonary/Chest: Effort normal. No stridor. He exhibits tenderness.   Coarse BS bilaterally   Abdominal: Soft. Bowel sounds are normal. He exhibits no distension and no mass. There is no tenderness. There is no rebound and no guarding.   Musculoskeletal: Normal range of motion. He exhibits deformity. He exhibits no edema or tenderness.   Neurological: He is alert and oriented to person, place, and time.   Skin: Skin is warm and dry. No rash noted. He is not diaphoretic. No erythema. No pallor.   Psychiatric: He has a normal mood and affect. His behavior is normal. Judgment and thought content normal.       Significant Labs:   ABG: No results for input(s): PH, PCO2, HCO3, POCSATURATED, BE in the last 48 hours., Blood Culture: No results for input(s): LABBLOO in the last 48 hours., BMP:   Recent Labs  Lab 01/10/18  1529   GLU 99      K 4.4      CO2 25   BUN 16   CREATININE 0.8   CALCIUM 9.1   MG 1.6   , CMP   Recent Labs  Lab 01/10/18  1529      K 4.4      CO2 25   GLU 99   BUN 16   CREATININE 0.8   CALCIUM 9.1   PROT  6.2   ALBUMIN 2.7*   BILITOT 0.9   ALKPHOS 95   AST 12   ALT 15   ANIONGAP 7*   ESTGFRAFRICA >60   EGFRNONAA >60   , CBC   Recent Labs  Lab 01/10/18  1530   WBC 9.04   HGB 13.7*   HCT 41.4      , INR   Recent Labs  Lab 01/10/18  1529   INR 1.1   , Lipid Panel No results for input(s): CHOL, HDL, LDLCALC, TRIG, CHOLHDL in the last 48 hours.,   Pathology Results  (Last 10 years)    None      , Troponin   Recent Labs  Lab 01/10/18  1529   TROPONINI 0.031*   , All pertinent lab results from the last 24 hours have been reviewed. and   Recent Lab Results       01/10/18  1530 01/10/18  1529      Albumin  2.7(L)     Alkaline Phosphatase  95     ALT  15     Anion Gap  7(L)     aPTT  26.4  Comment:  aPTT therapeutic range = 39-69 seconds     AST  12     Baso # 0.01      Basophil% 0.1      Total Bilirubin  0.9  Comment:  For infants and newborns, interpretation of results should be based  on gestational age, weight and in agreement with clinical  observations.  Premature Infant recommended reference ranges:  Up to 24 hours.............<8.0 mg/dL  Up to 48 hours............<12.0 mg/dL  3-5 days..................<15.0 mg/dL  6-29 days.................<15.0 mg/dL         Comment:  Values of less than 100 pg/ml are consistent with non-CHF populations.(H)      BUN, Bld  16     Calcium  9.1     Chloride  104     CO2  25     Creatinine  0.8     Differential Method Automated      eGFR if   >60     eGFR if non   >60  Comment:  Calculation used to obtain the estimated glomerular filtration  rate (eGFR) is the CKD-EPI equation.        Eos # 0.0      Eosinophil% 0.4      Glucose  99     Gran # 7.3      Gran% 80.7(H)      Hematocrit 41.4      Hemoglobin 13.7(L)      Coumadin Monitoring INR  1.1  Comment:  Coumadin Therapy:  2.0 - 3.0 for INR for all indicators except mechanical heart valves  and antiphospholipid syndromes which should use 2.5 - 3.5.       Lymph # 0.9(L)      Lymph% 10.4(L)       Magnesium  1.6     MCH 29.5      MCHC 33.1      MCV 89      Mono # 0.8      Mono% 8.4      MPV 9.4      Platelets 238      Potassium  4.4     Total Protein  6.2     Protime  11.6     RBC 4.64      RDW 16.0(H)      Sodium  136     Troponin I  0.031  Comment:  The reference interval for Troponin I represents the 99th percentile   cutoff   for our facility and is consistent with 3rd generation assay   performance.  (H)     WBC 9.04            Significant Imaging: CT scan: CT ABDOMEN PELVIS WITH CONTRAST: No results found for this visit on 01/10/18. and CT ABDOMEN PELVIS WITHOUT CONTRAST: No results found for this visit on 01/10/18., Echocardiogram: 2D echo with color flow doppler: No results found for this or any previous visit. and X-Ray: CXR: X-Ray Chest 1 View (CXR): No results found for this visit on 01/10/18. and X-Ray Chest PA and Lateral (CXR):   Results for orders placed or performed during the hospital encounter of 01/10/18   X-Ray Chest PA And Lateral    Narrative    Chest, 2 views: Chronic lung markings are seen bilaterally.  No consolidation or pleural effusion is seen.  The heart is normal in size.  Calcified atheromatous disease affects the aorta.  Left-sided pacemaker devices in place.  Age-appropriate degenerative changes affect the skeleton.    Impression     As above.      Electronically signed by: Eneida Montes De Oca MD  Date:     01/10/18  Time:    15:51     and KUB: X-Ray Abdomen AP 1 View (KUB): No results found for this visit on 01/10/18.

## 2018-01-10 NOTE — TELEPHONE ENCOUNTER
Spoke with Heather at [facility] and she states that this patient is c/o chest pain, he has crackles in 3/4 lobes, is coughing up blood in the am and when he had a coughing spell, his lips turned blue.  States O2 sat is at 97 % with 2 L of O2.  I recommended for him to go to the ER to get checked out.  Heather verbalized understanding.

## 2018-01-10 NOTE — ED PROVIDER NOTES
Encounter Date: 1/10/2018       History     Chief Complaint   Patient presents with    Cough     HPI   This 89-year-old male presents to the emergency room complaining of a 3 week history of a cough.  The patient also has some pain in the top of his chest also present constantly for the last 3 weeks.  The patient relates that this is a dull pain.  It is not worse when he coughs.  He denies fever chills sweats or sputum production.  He has no shortness of breath.  He is essentially otherwise well without other injuries or problems.  Review of patient's allergies indicates:  No Known Allergies  Past Medical History:   Diagnosis Date    CAD (coronary artery disease), native coronary artery     Cardiomyopathy     Carotid artery disease     COPD (chronic obstructive pulmonary disease)     Dyslipidemia     Encounter for blood transfusion     Esophageal reflux     GERD (gastroesophageal reflux disease)     GI hemorrhage 10/08/2015    Heart failure     HTN (hypertension), benign     Left ventricular systolic dysfunction     Paroxysmal A-fib     S/P CABG x 4      Past Surgical History:   Procedure Laterality Date    CORONARY ARTERY BYPASS GRAFT      x4    HIP SURGERY      LEG SURGERY      TONSILLECTOMY       History reviewed. No pertinent family history.  Social History   Substance Use Topics    Smoking status: Former Smoker     Packs/day: 2.00     Years: 20.00     Quit date: 1/1/1970    Smokeless tobacco: Never Used    Alcohol use Yes      Comment: once in a while     Review of Systems  IThe patient was questioned specifically with regard to the following.  General: Fever, chills, sweats. Neuro: Headache. Eyes: eye problems. ENT: Ear pain, sore throat. Cardiovascular: Chest pain. Respiratory: Cough, shortness of breath. Gastrointestinal: Abdominal pain, vomiting, diarrhea. Genitourinary: Painful urination.  Musculoskeletal: Arm and leg problems. Skin: Rash.  The review of systems was negative except  for the following: chest pain cough, small amount of hemoptysis.    Physical Exam     Initial Vitals [01/10/18 1458]   BP Pulse Resp Temp SpO2   93/63 74 17 96.6 °F (35.9 °C) 97 %      MAP       73         Physical Exam  The patient was examined specifically for the following:   General:No significant distress, Good color, Warm and dry. Head and neck:Scalp atraumatic, Neck supple. Neurological:Appropriate conversation, Gross motor deficits. Eyes:Conjugate gaze, Clear corneas. ENT: No epistaxis. Cardiac: Regular rate and rhythm, Grossly normal heart tones. Pulmonary: Wheezing, Rales. Gastrointestinal: Abdominal tenderness, Abdominal distention. Musculoskeletal: Extremity deformity, Apparent pain with range of motion of the joints. Skin: Rash.   The findings on examination were normal except for the following: Patient has a blood pressure of 93/63.  He is not tachycardic there is no evidence of respiratory distress she is afebrile.  The lungs are remarkable for bilateral wheezing.  Heart tones remarkable for regular rate and rhythm.    ED Course   Procedures  Labs Reviewed   COMPREHENSIVE METABOLIC PANEL - Abnormal; Notable for the following:        Result Value    Albumin 2.7 (*)     Anion Gap 7 (*)     All other components within normal limits   CBC W/ AUTO DIFFERENTIAL - Abnormal; Notable for the following:     Hemoglobin 13.7 (*)     RDW 16.0 (*)     Lymph # 0.9 (*)     Gran% 80.7 (*)     Lymph% 10.4 (*)     All other components within normal limits   TROPONIN I - Abnormal; Notable for the following:     Troponin I 0.031 (*)     All other components within normal limits   B-TYPE NATRIURETIC PEPTIDE - Abnormal; Notable for the following:      (*)     All other components within normal limits   APTT   PROTIME-INR   MAGNESIUM     EKG Readings: (Independently Interpreted)   This patient is in a paced rhythm with a heart rate is 75.  QRS complexes are wide.  There are nonspecific ST and T-wave changes.  There is  no definite evidence of acute myocardial infarction or malignant arrhythmia.       X-Rays:   Independently Interpreted Readings:   Other Readings:  Chest x-ray fails to reveal pneumothorax pneumonia pleural effusion    Medical decision making: Given the above this patient presents to emergency room with with a 2 week history of cough.  The patient has history coronary artery disease.  He had chest pain.  His troponin is slightly elevated.  He was seen and evaluated by Dr. ramirez in the emergency room.  Admission was recommended.  Dr. Gonzalez, agrees to accept the patient onto his service.  We will week.  Cardiac markers we will give gentle diuresis bronchodilators and have the patient evaluated by hospital medicine.                     ED Course      Clinical Impression:   The primary encounter diagnosis was Acute chest pain. Diagnoses of Cough, Bronchospasm, acute, and Elevated troponin were also pertinent to this visit.                           Frantz Hickey MD  01/10/18 9680

## 2018-01-11 ENCOUNTER — TELEPHONE (OUTPATIENT)
Dept: FAMILY MEDICINE | Facility: CLINIC | Age: 83
End: 2018-01-11

## 2018-01-11 VITALS
BODY MASS INDEX: 22.63 KG/M2 | RESPIRATION RATE: 16 BRPM | HEART RATE: 69 BPM | OXYGEN SATURATION: 96 % | DIASTOLIC BLOOD PRESSURE: 54 MMHG | SYSTOLIC BLOOD PRESSURE: 108 MMHG | HEIGHT: 70 IN | TEMPERATURE: 97 F | WEIGHT: 158.06 LBS

## 2018-01-11 LAB
ANION GAP SERPL CALC-SCNC: 9 MMOL/L
BASOPHILS # BLD AUTO: 0.01 K/UL
BASOPHILS NFR BLD: 0.1 %
BUN SERPL-MCNC: 17 MG/DL
CALCIUM SERPL-MCNC: 8.7 MG/DL
CHLORIDE SERPL-SCNC: 100 MMOL/L
CO2 SERPL-SCNC: 26 MMOL/L
CREAT SERPL-MCNC: 0.8 MG/DL
DIFFERENTIAL METHOD: ABNORMAL
EOSINOPHIL # BLD AUTO: 0 K/UL
EOSINOPHIL NFR BLD: 0.5 %
ERYTHROCYTE [DISTWIDTH] IN BLOOD BY AUTOMATED COUNT: 16 %
EST. GFR  (AFRICAN AMERICAN): >60 ML/MIN/1.73 M^2
EST. GFR  (NON AFRICAN AMERICAN): >60 ML/MIN/1.73 M^2
GLUCOSE SERPL-MCNC: 93 MG/DL
HCT VFR BLD AUTO: 38 %
HGB BLD-MCNC: 12.6 G/DL
LYMPHOCYTES # BLD AUTO: 1.1 K/UL
LYMPHOCYTES NFR BLD: 12.7 %
MCH RBC QN AUTO: 29.8 PG
MCHC RBC AUTO-ENTMCNC: 33.2 G/DL
MCV RBC AUTO: 90 FL
MONOCYTES # BLD AUTO: 0.9 K/UL
MONOCYTES NFR BLD: 10 %
NEUTROPHILS # BLD AUTO: 6.8 K/UL
NEUTROPHILS NFR BLD: 76.7 %
PLATELET # BLD AUTO: 242 K/UL
PMV BLD AUTO: 9.8 FL
POTASSIUM SERPL-SCNC: 4.1 MMOL/L
RBC # BLD AUTO: 4.23 M/UL
SODIUM SERPL-SCNC: 135 MMOL/L
TROPONIN I SERPL DL<=0.01 NG/ML-MCNC: 0.02 NG/ML
TROPONIN I SERPL DL<=0.01 NG/ML-MCNC: 0.03 NG/ML
WBC # BLD AUTO: 8.84 K/UL

## 2018-01-11 PROCEDURE — G0378 HOSPITAL OBSERVATION PER HR: HCPCS

## 2018-01-11 PROCEDURE — 25000242 PHARM REV CODE 250 ALT 637 W/ HCPCS: Performed by: FAMILY MEDICINE

## 2018-01-11 PROCEDURE — 27000221 HC OXYGEN, UP TO 24 HOURS

## 2018-01-11 PROCEDURE — 80048 BASIC METABOLIC PNL TOTAL CA: CPT

## 2018-01-11 PROCEDURE — 63600175 PHARM REV CODE 636 W HCPCS: Performed by: EMERGENCY MEDICINE

## 2018-01-11 PROCEDURE — 36415 COLL VENOUS BLD VENIPUNCTURE: CPT

## 2018-01-11 PROCEDURE — 25000003 PHARM REV CODE 250: Performed by: NURSE PRACTITIONER

## 2018-01-11 PROCEDURE — 85025 COMPLETE CBC W/AUTO DIFF WBC: CPT

## 2018-01-11 PROCEDURE — 84484 ASSAY OF TROPONIN QUANT: CPT | Mod: 91

## 2018-01-11 PROCEDURE — 94761 N-INVAS EAR/PLS OXIMETRY MLT: CPT

## 2018-01-11 PROCEDURE — 96376 TX/PRO/DX INJ SAME DRUG ADON: CPT

## 2018-01-11 PROCEDURE — 25000242 PHARM REV CODE 250 ALT 637 W/ HCPCS: Performed by: EMERGENCY MEDICINE

## 2018-01-11 PROCEDURE — 84484 ASSAY OF TROPONIN QUANT: CPT

## 2018-01-11 PROCEDURE — 25000003 PHARM REV CODE 250: Performed by: EMERGENCY MEDICINE

## 2018-01-11 PROCEDURE — 94640 AIRWAY INHALATION TREATMENT: CPT

## 2018-01-11 PROCEDURE — 25000003 PHARM REV CODE 250: Performed by: FAMILY MEDICINE

## 2018-01-11 RX ORDER — FUROSEMIDE 20 MG/1
20 TABLET ORAL DAILY
Status: DISCONTINUED | OUTPATIENT
Start: 2018-01-12 | End: 2018-01-11 | Stop reason: HOSPADM

## 2018-01-11 RX ORDER — DOXYCYCLINE 100 MG/1
100 CAPSULE ORAL 2 TIMES DAILY
Qty: 20 CAPSULE | Refills: 0 | Status: SHIPPED | OUTPATIENT
Start: 2018-01-11 | End: 2018-01-21

## 2018-01-11 RX ORDER — PREDNISONE 20 MG/1
40 TABLET ORAL DAILY
Qty: 8 TABLET | Refills: 0 | Status: SHIPPED | OUTPATIENT
Start: 2018-01-11 | End: 2018-03-19

## 2018-01-11 RX ADMIN — VALSARTAN 40 MG: 40 TABLET ORAL at 09:01

## 2018-01-11 RX ADMIN — ATORVASTATIN CALCIUM 40 MG: 40 TABLET, FILM COATED ORAL at 09:01

## 2018-01-11 RX ADMIN — IPRATROPIUM BROMIDE AND ALBUTEROL SULFATE 3 ML: .5; 3 SOLUTION RESPIRATORY (INHALATION) at 12:01

## 2018-01-11 RX ADMIN — BENZONATATE 100 MG: 100 CAPSULE ORAL at 09:01

## 2018-01-11 RX ADMIN — BUDESONIDE 0.5 MG: 0.5 INHALANT RESPIRATORY (INHALATION) at 07:01

## 2018-01-11 RX ADMIN — DOCUSATE CALCIUM 240 MG: 240 CAPSULE, LIQUID FILLED ORAL at 09:01

## 2018-01-11 RX ADMIN — EZETIMIBE 10 MG: 10 TABLET ORAL at 09:01

## 2018-01-11 RX ADMIN — SPIRONOLACTONE 50 MG: 25 TABLET ORAL at 09:01

## 2018-01-11 RX ADMIN — TAMSULOSIN HYDROCHLORIDE 0.4 MG: 0.4 CAPSULE ORAL at 09:01

## 2018-01-11 RX ADMIN — METOPROLOL TARTRATE 25 MG: 25 TABLET ORAL at 09:01

## 2018-01-11 RX ADMIN — MEMANTINE 10 MG: 10 TABLET ORAL at 09:01

## 2018-01-11 RX ADMIN — FUROSEMIDE 20 MG: 10 INJECTION, SOLUTION INTRAMUSCULAR; INTRAVENOUS at 09:01

## 2018-01-11 RX ADMIN — IPRATROPIUM BROMIDE AND ALBUTEROL SULFATE 3 ML: .5; 3 SOLUTION RESPIRATORY (INHALATION) at 07:01

## 2018-01-11 RX ADMIN — RANOLAZINE 1000 MG: 500 TABLET, FILM COATED, EXTENDED RELEASE ORAL at 09:01

## 2018-01-11 RX ADMIN — POLYETHYLENE GLYCOL 3350 17 G: 17 POWDER, FOR SOLUTION ORAL at 09:01

## 2018-01-11 RX ADMIN — ASPIRIN 81 MG: 81 TABLET, COATED ORAL at 09:01

## 2018-01-11 RX ADMIN — IPRATROPIUM BROMIDE AND ALBUTEROL SULFATE 3 ML: .5; 3 SOLUTION RESPIRATORY (INHALATION) at 03:01

## 2018-01-11 RX ADMIN — CLOPIDOGREL BISULFATE 75 MG: 75 TABLET ORAL at 09:01

## 2018-01-11 NOTE — H&P
"[facility] Medicine  History & Physical    Patient Name: See Leo  MRN: [id number]  Admission Date: 1/10/2018  Attending Physician: Suhail Gonzalez MD   Primary Care Provider: Suhail Gonzalez MD         Patient information was obtained from patient and ER records.     Subjective:     Principal Problem:<principal problem not specified>    Chief Complaint:   Chief Complaint   Patient presents with    Cough        HPI: 89 year old male with h/o COPD and CHF(systolic) presents to ED with SOB and CP.  He is not the best historian due to dementia, but his family reports that home health nurse went to see him this afternoon.  While nurse was there,  he had a violent persistent episode of coughing that culminated in blue lips and a pulse ox of 80 at the time. Nurse felt he was "rattling" and wheezing. He apparently was reporting precordial CP at this time as well, so she sent him to ED. Anderson saw him who suggested keeping for serial TPN and low dose IV lasix, BNP is 326.    Past Medical History:   Diagnosis Date    CAD (coronary artery disease), native coronary artery     Cardiomyopathy     Carotid artery disease     COPD (chronic obstructive pulmonary disease)     Dyslipidemia     Encounter for blood transfusion     Esophageal reflux     GERD (gastroesophageal reflux disease)     GI hemorrhage 10/08/2015    Heart failure     HTN (hypertension), benign     Left ventricular systolic dysfunction     Paroxysmal A-fib     S/P CABG x 4        Past Surgical History:   Procedure Laterality Date    CORONARY ARTERY BYPASS GRAFT      x4    HIP SURGERY      LEG SURGERY      TONSILLECTOMY         Review of patient's allergies indicates:  No Known Allergies    No current facility-administered medications on file prior to encounter.      Current Outpatient Prescriptions on File Prior to Encounter   Medication Sig    albuterol-ipratropium 2.5mg-0.5mg/3mL (DUO-NEB) 0.5 mg-3 mg(2.5 " mg base)/3 mL nebulizer solution Take 3 mLs by nebulization every 6 (six) hours as needed for Wheezing. Rescue    aspirin (ECOTRIN) 81 MG EC tablet Take 81 mg by mouth once daily.    atorvastatin (LIPITOR) 40 MG tablet Take 40 mg by mouth once daily.    b complex vitamins capsule Take 1 capsule by mouth once daily.    budesonide (PULMICORT) 0.5 mg/2 mL nebulizer solution TAKE 2 MLS (0.5 MG TOTAL) BY NEBULIZATION 2 (TWO) TIMES DAILY. CONTROLLER    clopidogrel (PLAVIX) 75 mg tablet Take 75 mg by mouth once daily.    docusate calcium (SURFAK) 240 mg capsule Take 240 mg by mouth 2 (two) times daily.    latanoprost 0.005 % ophthalmic solution PLACE 1 DROP INTO BOTH EYES EVERY EVENING.    memantine (NAMENDA) 10 MG Tab TAKE 1 TABLET (10 MG TOTAL) BY MOUTH 2 (TWO) TIMES DAILY.    metoprolol tartrate (LOPRESSOR) 25 MG tablet TAKE 1 TABLET BY MOUTH TWICE A DAY (Patient taking differently: Take 1/2 tablet orally twice daily)    ranolazine (RANEXA) 500 MG Tb12 Take 1,000 mg by mouth 2 (two) times daily.     spironolactone (ALDACTONE) 50 MG tablet Take 50 mg by mouth once daily.    tamsulosin (FLOMAX) 0.4 mg Cp24 TAKE 1 CAPSULE (0.4 MG TOTAL) BY MOUTH ONCE DAILY.    umeclidinium-vilanterol (ANORO ELLIPTA) 62.5-25 mcg/actuation DsDv Inhale 1 puff into the lungs once daily. Controller    valsartan (DIOVAN) 40 MG tablet Take 40 mg by mouth once daily. Take 1 tablet orally once a day if blood pressure top number foes above 100    VENTOLIN HFA 90 mcg/actuation inhaler INHALE 2 PUFFS INTO THE LUNGS EVERY 6 (SIX) HOURS AS NEEDED FOR WHEEZING.    ZETIA 10 mg tablet Take 10 mg by mouth once daily.    furosemide (LASIX) 20 MG tablet TAKE 1 TABLET ORALLY ONCE A DAY AS NEEDED. FOR WEIGHT GAIN BY 2 POUNDS    NITROSTAT 0.4 mg SL tablet Place 0.4 mg under the tongue every 5 (five) minutes as needed.      Family History     None        Social History Main Topics    Smoking status: Former Smoker     Packs/day: 2.00     Years:  20.00     Quit date: 1/1/1970    Smokeless tobacco: Never Used    Alcohol use Yes      Comment: once in a while    Drug use: Unknown    Sexual activity: Not on file     Review of Systems   Constitutional: Negative for activity change, appetite change, chills, diaphoresis, fatigue, fever and unexpected weight change.   HENT: Negative for congestion, dental problem, drooling, ear discharge, ear pain, facial swelling, mouth sores, nosebleeds, postnasal drip, rhinorrhea, sinus pressure, sneezing, sore throat, trouble swallowing and voice change.    Eyes: Negative for photophobia, pain, discharge, redness, itching and visual disturbance.   Respiratory: Positive for cough and shortness of breath. Negative for apnea, chest tightness and wheezing.    Cardiovascular: Positive for chest pain. Negative for palpitations and leg swelling.   Gastrointestinal: Negative for abdominal distention, abdominal pain, blood in stool, constipation, diarrhea, nausea and vomiting.   Genitourinary: Negative for difficulty urinating, dysuria, enuresis, flank pain, frequency, hematuria and urgency.   Musculoskeletal: Negative for arthralgias, back pain, gait problem, joint swelling, myalgias, neck pain and neck stiffness.   Skin: Negative for color change, rash and wound.   Neurological: Negative for dizziness, tremors, seizures, syncope, facial asymmetry, speech difficulty, weakness, light-headedness, numbness and headaches.   Hematological: Negative for adenopathy. Does not bruise/bleed easily.   Psychiatric/Behavioral: Negative for agitation, behavioral problems, confusion, decreased concentration, dysphoric mood, sleep disturbance and suicidal ideas. The patient is not nervous/anxious.      Objective:     Vital Signs (Most Recent):  Temp: 96.9 °F (36.1 °C) (01/10/18 1727)  Pulse: 71 (01/10/18 1814)  Resp: 20 (01/10/18 1727)  BP: (!) 133/57 (01/10/18 1727)  SpO2: (!) 94 % (01/10/18 1727) Vital Signs (24h Range):  Temp:  [96.6 °F (35.9  °C)-96.9 °F (36.1 °C)] 96.9 °F (36.1 °C)  Pulse:  [70-81] 71  Resp:  [16-20] 20  SpO2:  [94 %-97 %] 94 %  BP: ()/(55-63) 133/57     Weight: 71.7 kg (158 lb 1.1 oz)  Body mass index is 22.68 kg/m².    Physical Exam   Constitutional: He is oriented to person, place, and time. He appears well-developed and well-nourished.   HENT:   Head: Normocephalic and atraumatic.   Right Ear: External ear normal.   Left Ear: External ear normal.   Nose: Nose normal.   Mouth/Throat: Oropharynx is clear and moist.   Eyes: Conjunctivae and EOM are normal. Pupils are equal, round, and reactive to light. Right eye exhibits no discharge. Left eye exhibits no discharge. No scleral icterus.   Neck: Normal range of motion. Neck supple. No JVD present. No tracheal deviation present. No thyromegaly present.   Cardiovascular: Normal rate, regular rhythm, normal heart sounds and intact distal pulses.    No murmur heard.  Pulmonary/Chest: Effort normal. No respiratory distress. He has wheezes. He has no rales. He exhibits no tenderness.   End exp wheeze at bases    Abdominal: Soft. Bowel sounds are normal. He exhibits no distension and no mass. There is no tenderness. There is no rebound and no guarding.   Musculoskeletal: Normal range of motion.   Lymphadenopathy:     He has no cervical adenopathy.   Neurological: He is alert and oriented to person, place, and time. He has normal reflexes. He displays normal reflexes. No cranial nerve deficit. He exhibits normal muscle tone. Coordination normal.   Skin: Skin is warm and dry.   Psychiatric: He has a normal mood and affect. His behavior is normal. Judgment and thought content normal.         CRANIAL NERVES     CN III, IV, VI   Pupils are equal, round, and reactive to light.  Extraocular motions are normal.        Significant Labs:   CBC:   Recent Labs  Lab 01/10/18  1530   WBC 9.04   HGB 13.7*   HCT 41.4        CMP:   Recent Labs  Lab 01/10/18  1529      K 4.4      CO2 25    GLU 99   BUN 16   CREATININE 0.8   CALCIUM 9.1   PROT 6.2   ALBUMIN 2.7*   BILITOT 0.9   ALKPHOS 95   AST 12   ALT 15   ANIONGAP 7*   EGFRNONAA >60       Significant Imaging: I have reviewed and interpreted all pertinent imaging results/findings within the past 24 hours.    Assessment/Plan:     Emphysema/COPD    Continue nebs. O2  I would be hesitant to use IV steroids with CHF             Acute chest pain    Will rule out MI on tele  Serial TPN  [facility] consulted already          Systolic heart failure secondary to coronary artery disease    Continue home meds, adding a little low dose IV lasix per Justin           Dementia    No behavioral disturbance   Continue his namenda BID           Dyslipidemia    Continue zetia and statin           Chronic atrial fibrillation    Currently NSR  No NOAC due to h/o GI bleed           VTE Risk Mitigation         Ordered     enoxaparin injection 40 mg  Daily     Route:  Subcutaneous        01/10/18 1723     Medium Risk of VTE  Once      01/10/18 1723             Suhail Gonzalez MD  Department of Hospital Medicine   [facility]

## 2018-01-11 NOTE — SUBJECTIVE & OBJECTIVE
Past Medical History:   Diagnosis Date    CAD (coronary artery disease), native coronary artery     Cardiomyopathy     Carotid artery disease     COPD (chronic obstructive pulmonary disease)     Dyslipidemia     Encounter for blood transfusion     Esophageal reflux     GERD (gastroesophageal reflux disease)     GI hemorrhage 10/08/2015    Heart failure     HTN (hypertension), benign     Left ventricular systolic dysfunction     Paroxysmal A-fib     S/P CABG x 4        Past Surgical History:   Procedure Laterality Date    CORONARY ARTERY BYPASS GRAFT      x4    HIP SURGERY      LEG SURGERY      TONSILLECTOMY         Review of patient's allergies indicates:  No Known Allergies    No current facility-administered medications on file prior to encounter.      Current Outpatient Prescriptions on File Prior to Encounter   Medication Sig    albuterol-ipratropium 2.5mg-0.5mg/3mL (DUO-NEB) 0.5 mg-3 mg(2.5 mg base)/3 mL nebulizer solution Take 3 mLs by nebulization every 6 (six) hours as needed for Wheezing. Rescue    aspirin (ECOTRIN) 81 MG EC tablet Take 81 mg by mouth once daily.    atorvastatin (LIPITOR) 40 MG tablet Take 40 mg by mouth once daily.    b complex vitamins capsule Take 1 capsule by mouth once daily.    budesonide (PULMICORT) 0.5 mg/2 mL nebulizer solution TAKE 2 MLS (0.5 MG TOTAL) BY NEBULIZATION 2 (TWO) TIMES DAILY. CONTROLLER    clopidogrel (PLAVIX) 75 mg tablet Take 75 mg by mouth once daily.    docusate calcium (SURFAK) 240 mg capsule Take 240 mg by mouth 2 (two) times daily.    latanoprost 0.005 % ophthalmic solution PLACE 1 DROP INTO BOTH EYES EVERY EVENING.    memantine (NAMENDA) 10 MG Tab TAKE 1 TABLET (10 MG TOTAL) BY MOUTH 2 (TWO) TIMES DAILY.    metoprolol tartrate (LOPRESSOR) 25 MG tablet TAKE 1 TABLET BY MOUTH TWICE A DAY (Patient taking differently: Take 1/2 tablet orally twice daily)    ranolazine (RANEXA) 500 MG Tb12 Take 1,000 mg by mouth 2 (two) times daily.      spironolactone (ALDACTONE) 50 MG tablet Take 50 mg by mouth once daily.    tamsulosin (FLOMAX) 0.4 mg Cp24 TAKE 1 CAPSULE (0.4 MG TOTAL) BY MOUTH ONCE DAILY.    umeclidinium-vilanterol (ANORO ELLIPTA) 62.5-25 mcg/actuation DsDv Inhale 1 puff into the lungs once daily. Controller    valsartan (DIOVAN) 40 MG tablet Take 40 mg by mouth once daily. Take 1 tablet orally once a day if blood pressure top number foes above 100    VENTOLIN HFA 90 mcg/actuation inhaler INHALE 2 PUFFS INTO THE LUNGS EVERY 6 (SIX) HOURS AS NEEDED FOR WHEEZING.    ZETIA 10 mg tablet Take 10 mg by mouth once daily.    furosemide (LASIX) 20 MG tablet TAKE 1 TABLET ORALLY ONCE A DAY AS NEEDED. FOR WEIGHT GAIN BY 2 POUNDS    NITROSTAT 0.4 mg SL tablet Place 0.4 mg under the tongue every 5 (five) minutes as needed.      Family History     None        Social History Main Topics    Smoking status: Former Smoker     Packs/day: 2.00     Years: 20.00     Quit date: 1/1/1970    Smokeless tobacco: Never Used    Alcohol use Yes      Comment: once in a while    Drug use: Unknown    Sexual activity: Not on file     Review of Systems   Constitutional: Negative for activity change, appetite change, chills, diaphoresis, fatigue, fever and unexpected weight change.   HENT: Negative for congestion, dental problem, drooling, ear discharge, ear pain, facial swelling, mouth sores, nosebleeds, postnasal drip, rhinorrhea, sinus pressure, sneezing, sore throat, trouble swallowing and voice change.    Eyes: Negative for photophobia, pain, discharge, redness, itching and visual disturbance.   Respiratory: Positive for cough and shortness of breath. Negative for apnea, chest tightness and wheezing.    Cardiovascular: Positive for chest pain. Negative for palpitations and leg swelling.   Gastrointestinal: Negative for abdominal distention, abdominal pain, blood in stool, constipation, diarrhea, nausea and vomiting.   Genitourinary: Negative for difficulty  urinating, dysuria, enuresis, flank pain, frequency, hematuria and urgency.   Musculoskeletal: Negative for arthralgias, back pain, gait problem, joint swelling, myalgias, neck pain and neck stiffness.   Skin: Negative for color change, rash and wound.   Neurological: Negative for dizziness, tremors, seizures, syncope, facial asymmetry, speech difficulty, weakness, light-headedness, numbness and headaches.   Hematological: Negative for adenopathy. Does not bruise/bleed easily.   Psychiatric/Behavioral: Negative for agitation, behavioral problems, confusion, decreased concentration, dysphoric mood, sleep disturbance and suicidal ideas. The patient is not nervous/anxious.      Objective:     Vital Signs (Most Recent):  Temp: 96.9 °F (36.1 °C) (01/10/18 1727)  Pulse: 71 (01/10/18 1814)  Resp: 20 (01/10/18 1727)  BP: (!) 133/57 (01/10/18 1727)  SpO2: (!) 94 % (01/10/18 1727) Vital Signs (24h Range):  Temp:  [96.6 °F (35.9 °C)-96.9 °F (36.1 °C)] 96.9 °F (36.1 °C)  Pulse:  [70-81] 71  Resp:  [16-20] 20  SpO2:  [94 %-97 %] 94 %  BP: ()/(55-63) 133/57     Weight: 71.7 kg (158 lb 1.1 oz)  Body mass index is 22.68 kg/m².    Physical Exam   Constitutional: He is oriented to person, place, and time. He appears well-developed and well-nourished.   HENT:   Head: Normocephalic and atraumatic.   Right Ear: External ear normal.   Left Ear: External ear normal.   Nose: Nose normal.   Mouth/Throat: Oropharynx is clear and moist.   Eyes: Conjunctivae and EOM are normal. Pupils are equal, round, and reactive to light. Right eye exhibits no discharge. Left eye exhibits no discharge. No scleral icterus.   Neck: Normal range of motion. Neck supple. No JVD present. No tracheal deviation present. No thyromegaly present.   Cardiovascular: Normal rate, regular rhythm, normal heart sounds and intact distal pulses.    No murmur heard.  Pulmonary/Chest: Effort normal. No respiratory distress. He has wheezes. He has no rales. He exhibits no  tenderness.   End exp wheeze at bases    Abdominal: Soft. Bowel sounds are normal. He exhibits no distension and no mass. There is no tenderness. There is no rebound and no guarding.   Musculoskeletal: Normal range of motion.   Lymphadenopathy:     He has no cervical adenopathy.   Neurological: He is alert and oriented to person, place, and time. He has normal reflexes. He displays normal reflexes. No cranial nerve deficit. He exhibits normal muscle tone. Coordination normal.   Skin: Skin is warm and dry.   Psychiatric: He has a normal mood and affect. His behavior is normal. Judgment and thought content normal.         CRANIAL NERVES     CN III, IV, VI   Pupils are equal, round, and reactive to light.  Extraocular motions are normal.        Significant Labs:   CBC:   Recent Labs  Lab 01/10/18  1530   WBC 9.04   HGB 13.7*   HCT 41.4        CMP:   Recent Labs  Lab 01/10/18  1529      K 4.4      CO2 25   GLU 99   BUN 16   CREATININE 0.8   CALCIUM 9.1   PROT 6.2   ALBUMIN 2.7*   BILITOT 0.9   ALKPHOS 95   AST 12   ALT 15   ANIONGAP 7*   EGFRNONAA >60       Significant Imaging: I have reviewed and interpreted all pertinent imaging results/findings within the past 24 hours.

## 2018-01-11 NOTE — TELEPHONE ENCOUNTER
----- Message from Myla Jin sent at 2018  2:55 PM CST -----  Contact: pt's daughter Carey Leo  MRN: [id number]  : 1925  PCP: Suhail Gonzalez  Home Phone      [phone]  Work Phone      Not on file.  Mobile          [phone]      MESSAGE:  Pt was seen in the hospital this morning and is adamant that one of the doctors told him they'd prescribe some cough drops because those are the only medications that helped pt's cough. Carey is wondering if we can call some cough drops in for pt. Please advise.  PHONE:  [phone]

## 2018-01-11 NOTE — SUBJECTIVE & OBJECTIVE
Interval note: see  above  Review of Systems   Constitutional: Negative for activity change, appetite change, chills, diaphoresis, fatigue, fever and unexpected weight change.   HENT: Negative for congestion, dental problem, drooling, ear discharge, ear pain, facial swelling, mouth sores, nosebleeds, postnasal drip, rhinorrhea, sinus pressure, sneezing, sore throat, trouble swallowing and voice change.    Eyes: Negative for photophobia, pain, discharge, redness, itching and visual disturbance.   Respiratory: Positive for cough and shortness of breath. Negative for apnea, chest tightness and wheezing.    Cardiovascular: Positive for chest pain. Negative for palpitations and leg swelling.   Gastrointestinal: Negative for abdominal distention, abdominal pain, blood in stool, constipation, diarrhea, nausea and vomiting.   Genitourinary: Negative for difficulty urinating, dysuria, enuresis, flank pain, frequency, hematuria and urgency.   Musculoskeletal: Negative for arthralgias, back pain, gait problem, joint swelling, myalgias, neck pain and neck stiffness.   Skin: Negative for color change, rash and wound.   Neurological: Negative for dizziness, tremors, seizures, syncope, facial asymmetry, speech difficulty, weakness, light-headedness, numbness and headaches.   Hematological: Negative for adenopathy. Does not bruise/bleed easily.   Psychiatric/Behavioral: Negative for agitation, behavioral problems, confusion, decreased concentration, dysphoric mood, sleep disturbance and suicidal ideas. The patient is not nervous/anxious.      Objective:     Vital Signs (Most Recent):  Temp: 96.9 °F (36.1 °C) (01/11/18 0800)  Pulse: 72 (01/11/18 1012)  Resp: 18 (01/11/18 0800)  BP: (!) 121/56 (01/11/18 0800)  SpO2: 97 % (01/11/18 0800) Vital Signs (24h Range):  Temp:  [96.6 °F (35.9 °C)-98.4 °F (36.9 °C)] 96.9 °F (36.1 °C)  Pulse:  [69-81] 72  Resp:  [16-20] 18  SpO2:  [93 %-100 %] 97 %  BP: ()/(55-69) 121/56     Weight: 71.7  kg (158 lb 1.1 oz)  Body mass index is 22.68 kg/m².    Physical Exam   Constitutional: He is oriented to person, place, and time. He appears well-developed and well-nourished.   HENT:   Head: Normocephalic and atraumatic.   Right Ear: External ear normal.   Left Ear: External ear normal.   Nose: Nose normal.   Mouth/Throat: Oropharynx is clear and moist.   Eyes: Conjunctivae and EOM are normal. Pupils are equal, round, and reactive to light. Right eye exhibits no discharge. Left eye exhibits no discharge. No scleral icterus.   Neck: Normal range of motion. Neck supple. No JVD present. No tracheal deviation present. No thyromegaly present.   Cardiovascular: Normal rate, regular rhythm, normal heart sounds and intact distal pulses.    No murmur heard.  Pulmonary/Chest: Effort normal. No respiratory distress. He has wheezes. He has no rales. He exhibits no tenderness.   End exp wheeze at bases    Abdominal: Soft. Bowel sounds are normal. He exhibits no distension and no mass. There is no tenderness. There is no rebound and no guarding.   Musculoskeletal: Normal range of motion.   Lymphadenopathy:     He has no cervical adenopathy.   Neurological: He is alert and oriented to person, place, and time. He has normal reflexes. He displays normal reflexes. No cranial nerve deficit. He exhibits normal muscle tone. Coordination normal.   Skin: Skin is warm and dry.   Psychiatric: He has a normal mood and affect. His behavior is normal. Judgment and thought content normal.         CRANIAL NERVES     CN III, IV, VI   Pupils are equal, round, and reactive to light.  Extraocular motions are normal.        Significant Labs:   CBC:     Recent Labs  Lab 01/10/18  1530 01/11/18  0329   WBC 9.04 8.84   HGB 13.7* 12.6*   HCT 41.4 38.0*    242     CMP:     Recent Labs  Lab 01/10/18  1529 01/11/18  0330    135*   K 4.4 4.1    100   CO2 25 26   GLU 99 93   BUN 16 17   CREATININE 0.8 0.8   CALCIUM 9.1 8.7   PROT 6.2  --     ALBUMIN 2.7*  --    BILITOT 0.9  --    ALKPHOS 95  --    AST 12  --    ALT 15  --    ANIONGAP 7* 9   EGFRNONAA >60 >60     BNP    Recent Labs  Lab 01/10/18  1530   *       Recent Labs  Lab 01/11/18  0934   TROPONINI 0.023     Mag 1.6    INR 1.1    Significant Imaging:    CXR Chronic lung markings are seen bilaterally.  No consolidation or pleural effusion is seen.  The heart is normal in size.  Calcified atheromatous disease affects the aorta.  Left-sided pacemaker devices in place.  Age-appropriate degenerative changes affect the skeleton.    EKG Ventricular-paced rhythm  Abnormal ECG  When compared with ECG of 17-NOV-2017 22:59,  Vent. rate has increased BY 4 BPM

## 2018-01-11 NOTE — PLAN OF CARE
01/11/18 1105   Medicare Message   Important Message from Medicare regarding Discharge Appeal Rights Given to patient/caregiver;Explained to patient/caregiver;Signed/date by patient/caregiver   Date IMM was signed 01/11/18   Time IMM was signed 1105   NEWMAN Message   Medicare Outpatient and Observation Notification regarding financial responsibility Given to patient/caregiver;Explained to patient/caregiver;Signed/date by patient/caregiver   Date NEWMAN was signed 01/11/18   Time NEWMAN was signed 110

## 2018-01-11 NOTE — PLAN OF CARE
01/11/18 1211   Discharge Assessment   Assessment Type Discharge Planning Assessment   Confirmed/corrected address and phone number on facesheet? Yes   Assessment information obtained from? Patient;Medical Record   Expected Length of Stay (days) 2   Communicated expected length of stay with patient/caregiver yes   Prior to hospitilization cognitive status: Alert/Oriented   Prior to hospitalization functional status: Assistive Equipment   Current cognitive status: Alert/Oriented   Current Functional Status: Assistive Equipment   Lives With spouse   Able to Return to Prior Arrangements yes   Is patient able to care for self after discharge? Yes   Who are your caregiver(s) and their phone number(s)? Lashawn Daniels   Patient's perception of discharge disposition home or selfcare   Readmission Within The Last 30 Days no previous admission in last 30 days   Patient currently being followed by outpatient case management? No   Patient currently receives any other outside agency services? No   Equipment Currently Used at Home cane, straight   Do you have any problems affording any of your prescribed medications? No   Is the patient taking medications as prescribed? yes   Does the patient have transportation home? Yes   Transportation Available family or friend will provide   Does the patient receive services at the [facility]? No   Discharge Plan A Home with family   Discharge Plan B Home with family   Patient/Family In Agreement With Plan yes     The pt is a 89 year old male who was admitted with a cough. The pt lives with his wife. The pt ambulates with a walker. The pt does not use 02 at home. The pt stated that he was recently d/c from . The pt is able to afford his medications. The pt has no other SW needs noted at this time. SW will continue to follow and offer support as needed.    Kwabena Yi LMSW

## 2018-01-11 NOTE — PLAN OF CARE
Problem: Patient Care Overview  Goal: Plan of Care Review  Outcome: Ongoing (interventions implemented as appropriate)  Quiet shift. No chest pain. Call bel in reach and luse and calling for assistance. SR up X 3. Plan of care discussed and verbalizes understanding. Saline lock  Patent and telemetry in use. Intermittent cough. HOB elevated.

## 2018-01-11 NOTE — HOSPITAL COURSE
Pt was kept over night. He has no more chest pain. (reports that the chest pain and SOB was over the course of 2 weeks). His troponin 0.023>0.032>0.021>0.031. Dr Anderson has seen him and  Ruled him out for MI. He repots that he has a cough but NO SOB. He reports that he is coughing up sputum. He reports that started a couple days ago. No URI S/S. He reports that he  Uses  His nebulizer at home twice a day. It does help with his cough.

## 2018-01-11 NOTE — DISCHARGE SUMMARY
"[facility] Medicine  Discharge Summary      Patient Name: See Leo  MRN: [id number]  Admission Date: 1/10/2018  Hospital Length of Stay: 0 days  Discharge Date and Time:  01/11/2018 11:40 AM  Attending Physician: Suhail Gonzalez MD   Discharging Provider: Sarah Olvera NP  Primary Care Provider: Suhail Gonzalez MD      HPI:   89 year old male with h/o COPD and CHF(systolic) presents to ED with SOB and CP.  He is not the best historian due to dementia, but his family reports that home health nurse went to see him this afternoon.  While nurse was there,  he had a violent persistent episode of coughing that culminated in blue lips and a pulse ox of 80 at the time. Nurse felt he was "rattling" and wheezing. He apparently was reporting precordial CP at this time as well, so she sent him to ED. Justin saw him who suggested keeping for serial TPN and low dose IV lasix, BNP is 326.    * No surgery found *      Hospital Course:   Pt was kept over night. He has no more chest pain. (reports that the chest pain and SOB was over the course of 2 weeks). His troponin 0.023>0.032>0.021>0.031. Dr Anderson has seen him and  Ruled him out for MI. He repots that he has a cough but NO SOB. He reports that he is coughing up sputum. He reports that started a couple days ago. No URI S/S. He reports that he  Uses  His nebulizer at home twice a day. It does help with his cough.      Consults:   Consults         Status Ordering Provider     Inpatient consult to Cardiology  Once     Provider:  Jalil Anderson MD    Acknowledged NICOLLE GUILLEN          * Acute chest pain    Will rule out MI on tele- ruled out  Serial TPN  [facility] consulted already          Emphysema/COPD    Continue nebs. O2  I would be hesitant to use IV steroids with CHF   He is coughing will treat as COPD exacerbation. Sent hjme with prednisone 40mg po x 4 days as well as DOxy 100mg po BID x 10. F/u PCP next week            Systolic heart " failure secondary to coronary artery disease    Continue home meds, adding a little low dose IV lasix per Justin - resume home dosing          Dementia    No behavioral disturbance   Continue his namenda BID           Dyslipidemia    Continue zetia and statin           Chronic atrial fibrillation    Currently NSR  No NOAC due to h/o GI bleed           Final Active Diagnoses:    Diagnosis Date Noted POA    PRINCIPAL PROBLEM:  Acute chest pain [R07.9] 01/10/2018 Yes    Emphysema/COPD [J43.9] 01/10/2018 Yes    Systolic heart failure secondary to coronary artery disease [I50.20, I25.10] 08/28/2016 Yes    Chronic atrial fibrillation [I48.2] 08/26/2016 Yes    Dyslipidemia [E78.5] 08/26/2016 Yes    Dementia [F03.90] 08/26/2016 Yes      Problems Resolved During this Admission:    Diagnosis Date Noted Date Resolved POA       Discharged Condition: good    Disposition: Home or Self Care    Follow Up:  Follow-up Information     Suhail Gonzalez MD In 4 days.    Specialty:  Family Medicine  Why:  outpatient services  Contact information:  [address]  [facility] [id number] [phone]             Follow up Today.               Patient Instructions:   No discharge procedures on file.    Significant Diagnostic Studies:   CBC:     Recent Labs  Lab 01/10/18  1530 01/11/18  0329   WBC 9.04 8.84   HGB 13.7* 12.6*   HCT 41.4 38.0*    242     CMP:     Recent Labs  Lab 01/10/18  1529 01/11/18  0330    135*   K 4.4 4.1    100   CO2 25 26   GLU 99 93   BUN 16 17   CREATININE 0.8 0.8   CALCIUM 9.1 8.7   PROT 6.2  --    ALBUMIN 2.7*  --    BILITOT 0.9  --    ALKPHOS 95  --    AST 12  --    ALT 15  --    ANIONGAP 7* 9   EGFRNONAA >60 >60     BNP    Recent Labs  Lab 01/10/18  1530   *       Recent Labs  Lab 01/11/18  0934   TROPONINI 0.023     Mag 1.6    INR 1.1    Significant Imaging:    CXR Chronic lung markings are seen bilaterally.  No consolidation or pleural effusion  is seen.  The heart is normal in size.  Calcified atheromatous disease affects the aorta.  Left-sided pacemaker devices in place.  Age-appropriate degenerative changes affect the skeleton.    EKG Ventricular-paced rhythm  Abnormal ECG  When compared with ECG of 17-NOV-2017 22:59,  Vent. rate has increased BY 4 BPM      Pending Diagnostic Studies:     Procedure Component Value Units Date/Time    Troponin I [[id number]]     Order Status:  Sent Lab Status:  No result     Specimen:  Blood from Blood     Troponin I [[id number]]     Order Status:  Sent Lab Status:  No result     Specimen:  Blood from Blood          Medications:  Reconciled Home Medications:   Current Discharge Medication List      START taking these medications    Details   doxycycline (VIBRAMYCIN) 100 MG Cap Take 1 capsule (100 mg total) by mouth 2 (two) times daily.  Qty: 20 capsule, Refills: 0      predniSONE (DELTASONE) 20 MG tablet Take 2 tablets (40 mg total) by mouth once daily.  Qty: 8 tablet, Refills: 0         CONTINUE these medications which have NOT CHANGED    Details   albuterol-ipratropium 2.5mg-0.5mg/3mL (DUO-NEB) 0.5 mg-3 mg(2.5 mg base)/3 mL nebulizer solution Take 3 mLs by nebulization every 6 (six) hours as needed for Wheezing. Rescue  Qty: 1 Box, Refills: 2    Associated Diagnoses: Chronic obstructive pulmonary disease with acute exacerbation      aspirin (ECOTRIN) 81 MG EC tablet Take 81 mg by mouth once daily.      atorvastatin (LIPITOR) 40 MG tablet Take 40 mg by mouth once daily.  Refills: 5      b complex vitamins capsule Take 1 capsule by mouth once daily.      budesonide (PULMICORT) 0.5 mg/2 mL nebulizer solution TAKE 2 MLS (0.5 MG TOTAL) BY NEBULIZATION 2 (TWO) TIMES DAILY. CONTROLLER  Qty: 60 mL, Refills: 5    Comments: DX Code Needed  .  Associated Diagnoses: Chronic obstructive pulmonary disease with acute exacerbation      clopidogrel (PLAVIX) 75 mg tablet Take 75 mg by mouth once daily.      docusate calcium (SURFAK) 240 mg  capsule Take 240 mg by mouth 2 (two) times daily.      latanoprost 0.005 % ophthalmic solution PLACE 1 DROP INTO BOTH EYES EVERY EVENING.  Qty: 2.5 mL, Refills: 0      memantine (NAMENDA) 10 MG Tab TAKE 1 TABLET (10 MG TOTAL) BY MOUTH 2 (TWO) TIMES DAILY.  Qty: 180 tablet, Refills: 3      metoprolol tartrate (LOPRESSOR) 25 MG tablet TAKE 1 TABLET BY MOUTH TWICE A DAY  Qty: 60 tablet, Refills: 7      ranolazine (RANEXA) 500 MG Tb12 Take 1,000 mg by mouth 2 (two) times daily.       spironolactone (ALDACTONE) 50 MG tablet Take 50 mg by mouth once daily.      tamsulosin (FLOMAX) 0.4 mg Cp24 TAKE 1 CAPSULE (0.4 MG TOTAL) BY MOUTH ONCE DAILY.  Qty: 30 capsule, Refills: 11      umeclidinium-vilanterol (ANORO ELLIPTA) 62.5-25 mcg/actuation DsDv Inhale 1 puff into the lungs once daily. Controller  Qty: 1 each, Refills: 5    Associated Diagnoses: Chronic obstructive pulmonary disease with acute exacerbation      valsartan (DIOVAN) 40 MG tablet Take 40 mg by mouth once daily. Take 1 tablet orally once a day if blood pressure top number foes above 100      VENTOLIN HFA 90 mcg/actuation inhaler INHALE 2 PUFFS INTO THE LUNGS EVERY 6 (SIX) HOURS AS NEEDED FOR WHEEZING.  Qty: 18 Inhaler, Refills: 0    Associated Diagnoses: Chronic obstructive pulmonary disease, unspecified COPD type      ZETIA 10 mg tablet Take 10 mg by mouth once daily.  Refills: 6      furosemide (LASIX) 20 MG tablet TAKE 1 TABLET ORALLY ONCE A DAY AS NEEDED. FOR WEIGHT GAIN BY 2 POUNDS  Refills: 11      NITROSTAT 0.4 mg SL tablet Place 0.4 mg under the tongue every 5 (five) minutes as needed.   Refills: 0             Indwelling Lines/Drains at time of discharge:   Lines/Drains/Airways          No matching active lines, drains, or airways          Time spent on the discharge of patient: 20 minutes  Patient was seen and examined on the date of discharge and determined to be suitable for discharge.         Sarah Olvera NP  Department of Hospital  Medicine  [facility]

## 2018-01-11 NOTE — PROGRESS NOTES
[facility]  Cardiology  Progress Note    Patient Name: See Leo  MRN: [id number]  Admission Date: 1/10/2018  Hospital Length of Stay: 0 days  Code Status: Full Code   Attending Physician: Suhail Gonzalez MD   Primary Care Physician: Suhail Gonzalez MD  Expected Discharge Date:   Principal Problem:<principal problem not specified>    Subjective:     Chief Complaint:  Cough/CP     HPI:   89 year old male with history of severe 3 vessel CAD post CABG x 3, COPD, chronic bronchitis, O2 dependence, CHF, PAF, PPM, dyslipidemia, carotid artery disease, AICD, presents with c/o dull chest pain associated with cough x 2 weeks, worsened with deep inspiration, not associated with nausea, diaphoresis, weakness, dizziness, non-radiating, rated 5/10 in severity.     ROS   Constitution: Negative.   HENT: Negative.    Eyes: Negative.    Cardiovascular: Positive for chest pain. Negative for claudication, cyanosis, dyspnea on exertion, irregular heartbeat, leg swelling, near-syncope, orthopnea, palpitations, paroxysmal nocturnal dyspnea and syncope.   Respiratory: Positive for cough, hemoptysis, shortness of breath and sputum production. Negative for sleep disturbances due to breathing, snoring and wheezing.    Endocrine: Negative.    Hematologic/Lymphatic: Negative.    Skin: Negative.    Musculoskeletal: Negative.    Gastrointestinal: Negative.    Genitourinary: Negative.    Neurological: Negative.    Psychiatric/Behavioral: Negative.    Allergic/Immunologic: Negative.    Objective:     Vital Signs (Most Recent):  Temp: 98.4 °F (36.9 °C) (01/11/18 0307)  Pulse: 79 (01/11/18 0804)  Resp: 16 (01/11/18 0714)  BP: (!) 111/55 (01/11/18 0307)  SpO2: 98 % (01/11/18 0714) Vital Signs (24h Range):  Temp:  [96.6 °F (35.9 °C)-98.4 °F (36.9 °C)] 98.4 °F (36.9 °C)  Pulse:  [69-81] 79  Resp:  [16-20] 16  SpO2:  [93 %-100 %] 98 %  BP: ()/(55-69) 111/55     Weight: 71.7 kg (158 lb 1.1 oz)  Body mass index is 22.68  kg/m².    SpO2: 98 %  O2 Device (Oxygen Therapy): nasal cannula      Intake/Output Summary (Last 24 hours) at 01/11/18 0842  Last data filed at 01/11/18 0418   Gross per 24 hour   Intake                0 ml   Output              700 ml   Net             -700 ml       Lines/Drains/Airways     Peripheral Intravenous Line                 Peripheral IV - Single Lumen 01/10/18 1531 Right Antecubital less than 1 day                Physical Exam   Constitutional: He is oriented to person, place, and time. He appears well-developed and well-nourished. No distress.   HENT:   Head: Normocephalic and atraumatic.   Neck: Normal range of motion. Neck supple. No JVD present. No tracheal deviation present.   Cardiovascular: Normal rate, regular rhythm and intact distal pulses.    Murmur (2/6 DEMETRIUS) heard.  Pulmonary/Chest: Effort normal. No stridor. He exhibits tenderness.   Coarse BS bilaterally   Abdominal: Soft. Bowel sounds are normal. He exhibits no distension and no mass. There is no tenderness. There is no rebound and no guarding.   Musculoskeletal: Normal range of motion. He exhibits deformity. He exhibits no edema or tenderness.   Neurological: He is alert and oriented to person, place, and time.   Skin: Skin is warm and dry. No rash noted. He is not diaphoretic. No erythema. No pallor.   Psychiatric: He has a normal mood and affect. His behavior is normal. Judgment and thought content normal.     Significant Labs:   ABG: No results for input(s): PH, PCO2, HCO3, POCSATURATED, BE in the last 48 hours., Blood Culture: No results for input(s): LABBLOO in the last 48 hours., BMP:   Recent Labs  Lab 01/10/18  1529 01/11/18  0330   GLU 99 93    135*   K 4.4 4.1    100   CO2 25 26   BUN 16 17   CREATININE 0.8 0.8   CALCIUM 9.1 8.7   MG 1.6  --    , CMP   Recent Labs  Lab 01/10/18  1529 01/11/18  0330    135*   K 4.4 4.1    100   CO2 25 26   GLU 99 93   BUN 16 17   CREATININE 0.8 0.8   CALCIUM 9.1 8.7   PROT  6.2  --    ALBUMIN 2.7*  --    BILITOT 0.9  --    ALKPHOS 95  --    AST 12  --    ALT 15  --    ANIONGAP 7* 9   ESTGFRAFRICA >60 >60   EGFRNONAA >60 >60   , CBC   Recent Labs  Lab 01/10/18  1530 01/11/18  0329   WBC 9.04 8.84   HGB 13.7* 12.6*   HCT 41.4 38.0*    242   , INR   Recent Labs  Lab 01/10/18  1529   INR 1.1   , Lipid Panel No results for input(s): CHOL, HDL, LDLCALC, TRIG, CHOLHDL in the last 48 hours.,   Pathology Results  (Last 10 years)    None      , Troponin   Recent Labs  Lab 01/10/18  1529 01/10/18  2133 01/11/18  0329   TROPONINI 0.031* 0.021 0.032*   , All pertinent lab results from the last 24 hours have been reviewed. and   Recent Lab Results       01/11/18  0330 01/11/18  0329 01/10/18  2133 01/10/18  1530 01/10/18  1529      Albumin     2.7(L)     Alkaline Phosphatase     95     ALT     15     Anion Gap 9    7(L)     aPTT     26.4  Comment:  aPTT therapeutic range = 39-69 seconds     AST     12     Baso #  0.01  0.01      Basophil%  0.1  0.1      Total Bilirubin     0.9  Comment:  For infants and newborns, interpretation of results should be based  on gestational age, weight and in agreement with clinical  observations.  Premature Infant recommended reference ranges:  Up to 24 hours.............<8.0 mg/dL  Up to 48 hours............<12.0 mg/dL  3-5 days..................<15.0 mg/dL  6-29 days.................<15.0 mg/dL       BNP    326  Comment:  Values of less than 100 pg/ml are consistent with non-CHF populations.(H)      BUN, Bld 17    16     Calcium 8.7    9.1     Chloride 100    104     CO2 26    25     Creatinine 0.8    0.8     Differential Method  Automated  Automated      eGFR if  >60    >60     eGFR if non  >60  Comment:  Calculation used to obtain the estimated glomerular filtration  rate (eGFR) is the CKD-EPI equation.       >60  Comment:  Calculation used to obtain the estimated glomerular filtration  rate (eGFR) is the CKD-EPI equation.         Eos #  0.0  0.0      Eosinophil%  0.5  0.4      Glucose 93    99     Gran #  6.8  7.3      Gran%  76.7(H)  80.7(H)      Hematocrit  38.0(L)  41.4      Hemoglobin  12.6(L)  13.7(L)      Coumadin Monitoring INR     1.1  Comment:  Coumadin Therapy:  2.0 - 3.0 for INR for all indicators except mechanical heart valves  and antiphospholipid syndromes which should use 2.5 - 3.5.       Lymph #  1.1  0.9(L)      Lymph%  12.7(L)  10.4(L)      Magnesium     1.6     MCH  29.8  29.5      MCHC  33.2  33.1      MCV  90  89      Mono #  0.9  0.8      Mono%  10.0  8.4      MPV  9.8  9.4      Platelets  242  238      Potassium 4.1    4.4     Total Protein     6.2     Protime     11.6     RBC  4.23(L)  4.64      RDW  16.0(H)  16.0(H)      Sodium 135(L)    136     Troponin I  0.032  Comment:  The reference interval for Troponin I represents the 99th percentile   cutoff   for our facility and is consistent with 3rd generation assay   performance.  (H) 0.021  Comment:  The reference interval for Troponin I represents the 99th percentile   cutoff   for our facility and is consistent with 3rd generation assay   performance.    0.031  Comment:  The reference interval for Troponin I represents the 99th percentile   cutoff   for our facility and is consistent with 3rd generation assay   performance.  (H)     WBC  8.84  9.04                      Significant Imaging: CT scan: CT ABDOMEN PELVIS WITH CONTRAST: No results found for this visit on 01/10/18. and CT ABDOMEN PELVIS WITHOUT CONTRAST: No results found for this visit on 01/10/18., Echocardiogram: 2D echo with color flow doppler: No results found for this or any previous visit. and X-Ray: CXR: X-Ray Chest 1 View (CXR): No results found for this visit on 01/10/18. and X-Ray Chest PA and Lateral (CXR):   Results for orders placed or performed during the hospital encounter of 01/10/18   X-Ray Chest PA And Lateral    Narrative    Chest, 2 views: Chronic lung markings are seen bilaterally.   No consolidation or pleural effusion is seen.  The heart is normal in size.  Calcified atheromatous disease affects the aorta.  Left-sided pacemaker devices in place.  Age-appropriate degenerative changes affect the skeleton.    Impression     As above.      Electronically signed by: Eneida Montes De Oca MD  Date:     01/10/18  Time:    15:51     and KUB: X-Ray Abdomen AP 1 View (KUB): No results found for this visit on 01/10/18.  Assessment and Plan:         Active Diagnoses:    Diagnosis Date Noted POA    Acute chest pain [R07.9] 01/10/2018 Yes    Emphysema/COPD [J43.9] 01/10/2018 Yes    Systolic heart failure secondary to coronary artery disease [I50.20, I25.10] 08/28/2016 Yes    Chronic atrial fibrillation [I48.2] 08/26/2016 Yes    Dyslipidemia [E78.5] 08/26/2016 Yes    Dementia [F03.90] 08/26/2016 Yes      Problems Resolved During this Admission:    Diagnosis Date Noted Date Resolved POA       VTE Risk Mitigation         Ordered     enoxaparin injection 40 mg  Daily     Route:  Subcutaneous        01/10/18 1723     Medium Risk of VTE  Once      01/10/18 1723        Current Facility-Administered Medications   Medication    albuterol-ipratropium 2.5mg-0.5mg/3mL nebulizer solution 3 mL    aspirin EC tablet 81 mg    atorvastatin tablet 40 mg    benzonatate capsule 100 mg    budesonide nebulizer solution 0.5 mg    clopidogrel tablet 75 mg    docusate calcium capsule 240 mg    enoxaparin injection 40 mg    ezetimibe tablet 10 mg    furosemide injection 20 mg    hydrocodone-acetaminophen 5-325mg per tablet 1 tablet    latanoprost 0.005 % ophthalmic solution 1 drop    memantine tablet 10 mg    metoprolol tartrate (LOPRESSOR) tablet 25 mg    ondansetron injection 4 mg    polyethylene glycol packet 17 g    ranolazine 12 hr tablet 1,000 mg    spironolactone tablet 50 mg    tamsulosin 24 hr capsule 0.4 mg    umeclidinium-vilanterol 62.5-25 mcg/actuation DsDv 1 puff    valsartan tablet 40 mg       Echo  9/2016 LVEF 30%, LV diastolic function normal, mild MT, TR, MA, moderate MR, PA systolic pressure 25 mmHg     MPI 7/2016: abnormal. Consistent with mostly scar amd minimal ishemia. Small fixed perfusion abnormality of severe intensity in the apical segment, inferior septal region. Medium fixed perfusion abnormality of severe intensity in the inferior region. Small fixed perfusion abnormality of moderate intensity in the inferior lateral region.     Atypical Chest Pain, likley pleuritic due to COPD with acute bronchitis:          MPI 7/2016: abnormal. Consistent with mostly scar amd minimal ishemia. Small fixed perfusion abnormality of severe intensity in the apical segment, inferior septal region. Medium fixed perfusion abnormality of severe intensity in the inferior region. Small fixed perfusion abnormality of moderate intensity in the inferior lateral region.  Elevated troponin , mildly elevated 0.031 likely supply demand mismath secondary to mild hypoxemia and bronchitis     Wheezing with cough suggestive of bronchitis now resolved; pt feels great  CAD s/p CABG. NSTEMI 7/14: unable to revascularize; went on to have CRTD  COPD with Bronchitis  Chronic systolic CHF  PPM/AICD/ ICMO EF 30% with mild AI MR  PAF  Dyslipidemia  Carotid artery disease     Plan: may DC home from CV standpoint; switch lasix to 20 mg po qd  Continue metoprolol,valsartan, aldactone  Continue breathing treatment        Thank you for your consult. I will follow-up with patient. Please contact us if you have any additional questions.     Transcribed by  Tony Perez NP for Dr GENNY Anderson  Cardiology   [facility]  I attest that I have personally seen and examined this patient. I have reviewed and discussed the management in detail as outlined above.

## 2018-01-11 NOTE — ASSESSMENT & PLAN NOTE
Continue nebs. O2  I would be hesitant to use IV steroids with CHF   He is coughing will treat as COPD exacerbation. Sent hjme with prednisone 40mg po x 4 days as well as DOxy 100mg po BID x 10. F/u PCP next week

## 2018-01-12 NOTE — PLAN OF CARE
Problem: Patient Care Overview  Goal: Plan of Care Review  Outcome: Outcome(s) achieved Date Met: 01/11/18  Patient stable. No reports of pain or shortness of breath on shift. Moderateexpiratory wheeze heard on ascultation. Patient to be discharged to home/self-care with PO steroids. Will follow-up with MD outpatient. Discharge instructions given to patient/family. They voiced understanding.

## 2018-01-17 NOTE — PLAN OF CARE
01/17/18 1422   Final Note   Assessment Type Final Discharge Note   Discharge Disposition Home   What phone number can be called within the next 1-3 days to see how you are doing after discharge? [phone]   Hospital Follow Up  Appt(s) scheduled? Yes   Discharge plans and expectations educations in teach back method with documentation complete? Yes   Right Care Referral Info   Post Acute Recommendation Home-care

## 2018-01-23 RX ORDER — LATANOPROST 50 UG/ML
SOLUTION/ DROPS OPHTHALMIC
Qty: 2.5 ML | Refills: 0 | Status: SHIPPED | OUTPATIENT
Start: 2018-01-23 | End: 2018-02-19 | Stop reason: SDUPTHER

## 2018-01-26 ENCOUNTER — LAB VISIT (OUTPATIENT)
Dept: LAB | Facility: HOSPITAL | Age: 83
End: 2018-01-26
Attending: INTERNAL MEDICINE
Payer: MEDICARE

## 2018-01-26 DIAGNOSIS — I25.119 ATHEROSCLEROTIC HEART DISEASE OF NATIVE CORONARY ARTERY WITH ANGINA PECTORIS: Primary | ICD-10-CM

## 2018-01-26 DIAGNOSIS — I50.22 CHRONIC SYSTOLIC HEART FAILURE: ICD-10-CM

## 2018-01-26 DIAGNOSIS — Z95.1 POSTSURGICAL AORTOCORONARY BYPASS STATUS: ICD-10-CM

## 2018-01-26 LAB
ALBUMIN SERPL BCP-MCNC: 2.4 G/DL
ALP SERPL-CCNC: 84 U/L
ALT SERPL W/O P-5'-P-CCNC: 13 U/L
ANION GAP SERPL CALC-SCNC: 8 MMOL/L
AST SERPL-CCNC: 13 U/L
BASOPHILS # BLD AUTO: 0.01 K/UL
BASOPHILS NFR BLD: 0.1 %
BILIRUB DIRECT SERPL-MCNC: 0.4 MG/DL
BILIRUB SERPL-MCNC: 0.7 MG/DL
BNP SERPL-MCNC: 264 PG/ML
BUN SERPL-MCNC: 16 MG/DL
CALCIUM SERPL-MCNC: 9.1 MG/DL
CHLORIDE SERPL-SCNC: 106 MMOL/L
CHOLEST SERPL-MCNC: 119 MG/DL
CHOLEST/HDLC SERPL: 2.5 {RATIO}
CO2 SERPL-SCNC: 26 MMOL/L
CREAT SERPL-MCNC: 0.8 MG/DL
DIFFERENTIAL METHOD: ABNORMAL
EOSINOPHIL # BLD AUTO: 0.1 K/UL
EOSINOPHIL NFR BLD: 0.7 %
ERYTHROCYTE [DISTWIDTH] IN BLOOD BY AUTOMATED COUNT: 16 %
EST. GFR  (AFRICAN AMERICAN): >60 ML/MIN/1.73 M^2
EST. GFR  (NON AFRICAN AMERICAN): >60 ML/MIN/1.73 M^2
GLUCOSE SERPL-MCNC: 81 MG/DL
HCT VFR BLD AUTO: 40.3 %
HDLC SERPL-MCNC: 48 MG/DL
HDLC SERPL: 40.3 %
HGB BLD-MCNC: 13.1 G/DL
LDLC SERPL CALC-MCNC: 58.8 MG/DL
LYMPHOCYTES # BLD AUTO: 1 K/UL
LYMPHOCYTES NFR BLD: 12 %
MCH RBC QN AUTO: 29.5 PG
MCHC RBC AUTO-ENTMCNC: 32.5 G/DL
MCV RBC AUTO: 91 FL
MONOCYTES # BLD AUTO: 0.6 K/UL
MONOCYTES NFR BLD: 6.7 %
NEUTROPHILS # BLD AUTO: 6.8 K/UL
NEUTROPHILS NFR BLD: 80.5 %
NONHDLC SERPL-MCNC: 71 MG/DL
PLATELET # BLD AUTO: 219 K/UL
PMV BLD AUTO: 10 FL
POTASSIUM SERPL-SCNC: 4.8 MMOL/L
PROT SERPL-MCNC: 5.7 G/DL
RBC # BLD AUTO: 4.44 M/UL
SODIUM SERPL-SCNC: 140 MMOL/L
TRIGL SERPL-MCNC: 61 MG/DL
WBC # BLD AUTO: 8.49 K/UL

## 2018-01-26 PROCEDURE — 85025 COMPLETE CBC W/AUTO DIFF WBC: CPT

## 2018-01-26 PROCEDURE — 80048 BASIC METABOLIC PNL TOTAL CA: CPT

## 2018-01-26 PROCEDURE — 36415 COLL VENOUS BLD VENIPUNCTURE: CPT

## 2018-01-26 PROCEDURE — 83880 ASSAY OF NATRIURETIC PEPTIDE: CPT

## 2018-01-26 PROCEDURE — 80076 HEPATIC FUNCTION PANEL: CPT

## 2018-01-26 PROCEDURE — 80061 LIPID PANEL: CPT

## 2018-01-29 ENCOUNTER — HOSPITAL ENCOUNTER (OUTPATIENT)
Dept: RADIOLOGY | Facility: HOSPITAL | Age: 83
Discharge: HOME | End: 2018-01-29
Attending: INTERNAL MEDICINE
Payer: MEDICARE

## 2018-01-29 DIAGNOSIS — R91.1 LUNG NODULE, SOLITARY: ICD-10-CM

## 2018-01-29 DIAGNOSIS — R06.02 SHORTNESS OF BREATH: ICD-10-CM

## 2018-01-29 DIAGNOSIS — R05.3 COUGH, PERSISTENT: ICD-10-CM

## 2018-01-29 DIAGNOSIS — R91.1 LUNG NODULE, SOLITARY: Primary | ICD-10-CM

## 2018-01-29 PROCEDURE — 71250 CT THORAX DX C-: CPT | Mod: TC

## 2018-01-29 PROCEDURE — 71250 CT THORAX DX C-: CPT | Mod: 26,,, | Performed by: RADIOLOGY

## 2018-01-29 NOTE — OR NURSING
Dr. Clay notified that patient reports he took his plavix on 01/28/2018 and is scheduled for bronchoscopy tomorrow. States to cancel procedure and office will reschedule. Eneida, surgery  notified RE: changes.

## 2018-01-29 NOTE — OR NURSING
Pt. Notified RE: cancellation of procedure. Informed that Dr. Clay's office will call to reschedule. Pt. Voiced understanding.

## 2018-01-30 ENCOUNTER — OFFICE VISIT (OUTPATIENT)
Dept: FAMILY MEDICINE | Facility: CLINIC | Age: 83
End: 2018-01-30
Payer: MEDICARE

## 2018-01-30 VITALS
BODY MASS INDEX: 23.04 KG/M2 | HEART RATE: 60 BPM | WEIGHT: 160.94 LBS | RESPIRATION RATE: 18 BRPM | SYSTOLIC BLOOD PRESSURE: 100 MMHG | HEIGHT: 70 IN | DIASTOLIC BLOOD PRESSURE: 60 MMHG

## 2018-01-30 DIAGNOSIS — R91.8 LUNG MASS: ICD-10-CM

## 2018-01-30 DIAGNOSIS — Z87.19 HISTORY OF GI BLEED: ICD-10-CM

## 2018-01-30 DIAGNOSIS — I48.20 CHRONIC ATRIAL FIBRILLATION: ICD-10-CM

## 2018-01-30 DIAGNOSIS — J43.9 PULMONARY EMPHYSEMA, UNSPECIFIED EMPHYSEMA TYPE: Primary | ICD-10-CM

## 2018-01-30 PROCEDURE — 99999 PR STA SHADOW: CPT | Mod: PBBFAC,,, | Performed by: FAMILY MEDICINE

## 2018-01-30 PROCEDURE — 99999 PR PBB SHADOW E&M-EST. PATIENT-LVL IV: CPT | Mod: PBBFAC,,, | Performed by: FAMILY MEDICINE

## 2018-01-30 PROCEDURE — 99214 OFFICE O/P EST MOD 30 MIN: CPT | Mod: PBBFAC | Performed by: FAMILY MEDICINE

## 2018-01-30 PROCEDURE — 99214 OFFICE O/P EST MOD 30 MIN: CPT | Mod: S$PBB | Performed by: FAMILY MEDICINE

## 2018-01-30 RX ORDER — RANOLAZINE 1000 MG/1
TABLET, FILM COATED, EXTENDED RELEASE ORAL
Refills: 3 | Status: ON HOLD | COMMUNITY
Start: 2018-01-13 | End: 2018-03-22 | Stop reason: HOSPADM

## 2018-01-30 NOTE — PROGRESS NOTES
Subjective:       Patient ID: See Leo is a 89 y.o. male.    Chief Complaint: Follow-up (hospital follow up; SOB)    Pt is a 89 y.o. male who presents for evaluation and management of   Encounter Diagnoses   Name Primary?    Pulmonary emphysema, unspecified emphysema type Yes    Lung mass     Chronic atrial fibrillation     History of GI bleed    .hosp[ital f/u  MI ruled out on tele   Sent home on prednisone taper, finished. SOB improved   Wheezing improved   Saw Obed yesterday----has bronch scheduled for Tuesday     Doing well on current meds. Denies any side effects. Prevention is up to date.  Review of Systems   Constitutional: Negative for fever.   Respiratory: Positive for shortness of breath. Negative for cough and wheezing.    Cardiovascular: Negative for chest pain.       Objective:      Physical Exam   Constitutional: He is oriented to person, place, and time. He appears well-developed and well-nourished.   HENT:   Head: Normocephalic and atraumatic.   Right Ear: External ear normal.   Left Ear: External ear normal.   Nose: Nose normal.   Mouth/Throat: Oropharynx is clear and moist.   Eyes: Conjunctivae and EOM are normal. Pupils are equal, round, and reactive to light. Right eye exhibits no discharge. Left eye exhibits no discharge. No scleral icterus.   Neck: Normal range of motion. Neck supple. No JVD present. No tracheal deviation present. No thyromegaly present.   Cardiovascular: Normal rate, regular rhythm, normal heart sounds and intact distal pulses.    No murmur heard.  Pulmonary/Chest: Effort normal and breath sounds normal. No respiratory distress. He has no wheezes. He has no rales. He exhibits no tenderness.   Faint Exp wheezes on left     Abdominal: Soft. Bowel sounds are normal. He exhibits no distension and no mass. There is no tenderness. There is no rebound and no guarding.   Musculoskeletal: Normal range of motion.   Lymphadenopathy:     He has no cervical adenopathy.    Neurological: He is alert and oriented to person, place, and time. He has normal reflexes. He displays normal reflexes. No cranial nerve deficit. He exhibits normal muscle tone. Coordination normal.   Skin: Skin is warm and dry.   Psychiatric: He has a normal mood and affect. His behavior is normal. Judgment and thought content normal.       Assessment:       1. Pulmonary emphysema, unspecified emphysema type    2. Lung mass    3. Chronic atrial fibrillation    4. History of GI bleed        Plan:   See was seen today for follow-up.    Diagnoses and all orders for this visit:    Pulmonary emphysema, unspecified emphysema type    Lung mass    Chronic atrial fibrillation    History of GI bleed    continue duonebs BID and anoro   No NOAC due to GI bleed   continue BB, statin, ARB   RTC 3 months       No Follow-up on file.

## 2018-01-31 ENCOUNTER — TELEPHONE (OUTPATIENT)
Dept: FAMILY MEDICINE | Facility: CLINIC | Age: 83
End: 2018-01-31

## 2018-01-31 RX ORDER — VALSARTAN 40 MG/1
TABLET ORAL
Qty: 90 TABLET | Refills: 3 | Status: SHIPPED | OUTPATIENT
Start: 2018-01-31 | End: 2018-03-19 | Stop reason: SDUPTHER

## 2018-01-31 NOTE — TELEPHONE ENCOUNTER
Received a call from Margaret with Home Health. States patient was seen in office on 1/30/2018 and noted on medication list from visit the medication Valsartan 40 mg take one tablet once daily for top blood pressure above 100.     Patient does not have medication bottle at home. Advise.    Margaret: [phone]

## 2018-02-06 ENCOUNTER — TELEPHONE (OUTPATIENT)
Dept: FAMILY MEDICINE | Facility: CLINIC | Age: 83
End: 2018-02-06

## 2018-02-06 ENCOUNTER — SURGERY (OUTPATIENT)
Age: 83
End: 2018-02-06

## 2018-02-06 ENCOUNTER — HOSPITAL ENCOUNTER (OUTPATIENT)
Facility: HOSPITAL | Age: 83
Discharge: HOME | End: 2018-02-06
Attending: INTERNAL MEDICINE | Admitting: INTERNAL MEDICINE
Payer: MEDICARE

## 2018-02-06 DIAGNOSIS — Z98.890 HISTORY OF BRONCHOSCOPY: ICD-10-CM

## 2018-02-06 PROBLEM — C34.32 MALIGNANT NEOPLASM OF LOWER LOBE OF LEFT LUNG: Status: ACTIVE | Noted: 2018-02-06

## 2018-02-06 PROCEDURE — 27200945 HC CYTOLOGY BRUSH: Performed by: INTERNAL MEDICINE

## 2018-02-06 PROCEDURE — 87116 MYCOBACTERIA CULTURE: CPT

## 2018-02-06 PROCEDURE — 88112 CYTOPATH CELL ENHANCE TECH: CPT | Mod: 26,,, | Performed by: PATHOLOGY

## 2018-02-06 PROCEDURE — 88313 SPECIAL STAINS GROUP 2: CPT | Mod: 26,,, | Performed by: PATHOLOGY

## 2018-02-06 PROCEDURE — 31632 BRONCHOSCOPY/LUNG BX ADDL: CPT

## 2018-02-06 PROCEDURE — 88305 TISSUE EXAM BY PATHOLOGIST: CPT | Performed by: PATHOLOGY

## 2018-02-06 PROCEDURE — 87210 SMEAR WET MOUNT SALINE/INK: CPT

## 2018-02-06 PROCEDURE — 63600175 PHARM REV CODE 636 W HCPCS: Performed by: INTERNAL MEDICINE

## 2018-02-06 PROCEDURE — 88341 IMHCHEM/IMCYTCHM EA ADD ANTB: CPT | Mod: 26,,, | Performed by: PATHOLOGY

## 2018-02-06 PROCEDURE — 25000003 PHARM REV CODE 250: Performed by: INTERNAL MEDICINE

## 2018-02-06 PROCEDURE — 87102 FUNGUS ISOLATION CULTURE: CPT

## 2018-02-06 PROCEDURE — 87106 FUNGI IDENTIFICATION YEAST: CPT

## 2018-02-06 PROCEDURE — 87206 SMEAR FLUORESCENT/ACID STAI: CPT

## 2018-02-06 PROCEDURE — 87070 CULTURE OTHR SPECIMN AEROBIC: CPT

## 2018-02-06 PROCEDURE — 27201012 HC FORCEPS, HOT/COLD, DISP: Performed by: INTERNAL MEDICINE

## 2018-02-06 PROCEDURE — 87205 SMEAR GRAM STAIN: CPT

## 2018-02-06 PROCEDURE — 94640 AIRWAY INHALATION TREATMENT: CPT

## 2018-02-06 PROCEDURE — 31623 DX BRONCHOSCOPE/BRUSH: CPT | Performed by: INTERNAL MEDICINE

## 2018-02-06 PROCEDURE — 31628 BRONCHOSCOPY/LUNG BX EACH: CPT | Performed by: INTERNAL MEDICINE

## 2018-02-06 PROCEDURE — 88305 TISSUE EXAM BY PATHOLOGIST: CPT | Mod: 26,59,, | Performed by: PATHOLOGY

## 2018-02-06 PROCEDURE — 27000404 HC TUBE ASPIRATING LUKEN 3-1/4IN: Performed by: INTERNAL MEDICINE

## 2018-02-06 PROCEDURE — 88305 TISSUE EXAM BY PATHOLOGIST: CPT | Mod: 26,,, | Performed by: PATHOLOGY

## 2018-02-06 PROCEDURE — 88342 IMHCHEM/IMCYTCHM 1ST ANTB: CPT | Mod: 26,,, | Performed by: PATHOLOGY

## 2018-02-06 PROCEDURE — 25000242 PHARM REV CODE 250 ALT 637 W/ HCPCS: Performed by: INTERNAL MEDICINE

## 2018-02-06 PROCEDURE — 87015 SPECIMEN INFECT AGNT CONCNTJ: CPT

## 2018-02-06 RX ORDER — LIDOCAINE HYDROCHLORIDE 40 MG/ML
4 INJECTION, SOLUTION RETROBULBAR ONCE
Status: COMPLETED | OUTPATIENT
Start: 2018-02-06 | End: 2018-02-06

## 2018-02-06 RX ORDER — FENTANYL CITRATE 50 UG/ML
INJECTION, SOLUTION INTRAMUSCULAR; INTRAVENOUS
Status: DISCONTINUED | OUTPATIENT
Start: 2018-02-06 | End: 2018-02-06 | Stop reason: HOSPADM

## 2018-02-06 RX ORDER — MIDAZOLAM HYDROCHLORIDE 5 MG/ML
INJECTION INTRAMUSCULAR; INTRAVENOUS
Status: DISCONTINUED | OUTPATIENT
Start: 2018-02-06 | End: 2018-02-06 | Stop reason: HOSPADM

## 2018-02-06 RX ORDER — LIDOCAINE HYDROCHLORIDE 20 MG/ML
2 JELLY TOPICAL ONCE
Status: DISCONTINUED | OUTPATIENT
Start: 2018-02-06 | End: 2018-02-06 | Stop reason: HOSPADM

## 2018-02-06 RX ORDER — MIDAZOLAM HYDROCHLORIDE 1 MG/ML
2 INJECTION INTRAMUSCULAR; INTRAVENOUS
Status: DISCONTINUED | OUTPATIENT
Start: 2018-02-06 | End: 2018-02-06 | Stop reason: HOSPADM

## 2018-02-06 RX ORDER — FENTANYL CITRATE 50 UG/ML
25 INJECTION, SOLUTION INTRAMUSCULAR; INTRAVENOUS
Status: DISCONTINUED | OUTPATIENT
Start: 2018-02-06 | End: 2018-02-06 | Stop reason: HOSPADM

## 2018-02-06 RX ORDER — LIDOCAINE HYDROCHLORIDE 40 MG/ML
4 SOLUTION TOPICAL ONCE
Status: DISCONTINUED | OUTPATIENT
Start: 2018-02-06 | End: 2018-02-06 | Stop reason: HOSPADM

## 2018-02-06 RX ORDER — SODIUM CHLORIDE, SODIUM LACTATE, POTASSIUM CHLORIDE, CALCIUM CHLORIDE 600; 310; 30; 20 MG/100ML; MG/100ML; MG/100ML; MG/100ML
INJECTION, SOLUTION INTRAVENOUS CONTINUOUS
Status: DISCONTINUED | OUTPATIENT
Start: 2018-02-06 | End: 2018-02-06 | Stop reason: HOSPADM

## 2018-02-06 RX ORDER — SODIUM CHLORIDE 9 MG/ML
INJECTION, SOLUTION INTRAVENOUS CONTINUOUS
Status: DISCONTINUED | OUTPATIENT
Start: 2018-02-06 | End: 2018-02-06 | Stop reason: HOSPADM

## 2018-02-06 RX ORDER — LIDOCAINE HYDROCHLORIDE 20 MG/ML
2 JELLY TOPICAL
Status: DISCONTINUED | OUTPATIENT
Start: 2018-02-06 | End: 2018-02-06 | Stop reason: HOSPADM

## 2018-02-06 RX ADMIN — RACEPINEPHRINE HYDROCHLORIDE 0.5 ML: 11.25 SOLUTION RESPIRATORY (INHALATION) at 08:02

## 2018-02-06 RX ADMIN — MIDAZOLAM HYDROCHLORIDE 2 MG: 5 INJECTION, SOLUTION INTRAMUSCULAR; INTRAVENOUS at 07:02

## 2018-02-06 RX ADMIN — LIDOCAINE HYDROCHLORIDE 4 ML: 40 INJECTION, SOLUTION RETROBULBAR; TOPICAL at 07:02

## 2018-02-06 RX ADMIN — FENTANYL CITRATE 50 MCG: 50 INJECTION, SOLUTION INTRAMUSCULAR; INTRAVENOUS at 07:02

## 2018-02-06 NOTE — TELEPHONE ENCOUNTER
----- Message from Chuckie Horta sent at 2018 11:38 AM CST -----  Contact: Wife - Sury Leo  MRN: [id number]  : 1925  PCP: Suhail Gonzalez  Home Phone      [phone]  Work Phone      Not on file.  [employer]          [phone]      MESSAGE: requesting continuation of home health services, at least for baths     Call [phone]    PCP: Lisa

## 2018-02-06 NOTE — TELEPHONE ENCOUNTER
Spoke with Jeanne at [facility]--pt is wanting H/H aide to come more than once a week. Explained to pt's wife/Sury that insurance will not cover any more than that, voiced understanding.

## 2018-02-06 NOTE — BRIEF OP NOTE
Brief Outpatient Discharge Note    Admit Date: 2/6/2018    Attending Physician: No att. providers found     Discharge Physician: No att. providers found    Allergies:  No Known Allergies       Final Diagnosis:  1) lung Cancer LLL  2) COPD   3) CAD S/P pacemaker   4) Hx/o Smoking    ICD-10-CM ICD-9-CM   1. History of bronchoscopy Z98.890 V45.89       Disposition: Home or Self Care    Patient Instructions:   Discharge Medication List as of 2/6/2018  8:12 AM      CONTINUE these medications which have NOT CHANGED    Details   albuterol-ipratropium 2.5mg-0.5mg/3mL (DUO-NEB) 0.5 mg-3 mg(2.5 mg base)/3 mL nebulizer solution Take 3 mLs by nebulization every 6 (six) hours as needed for Wheezing. Rescue, Starting Thu 9/7/2017, Until Fri 9/7/2018, Normal      atorvastatin (LIPITOR) 40 MG tablet Take 40 mg by mouth once daily., Starting 6/26/2015, Until Discontinued, Historical Med      b complex vitamins capsule Take 1 capsule by mouth once daily., Until Discontinued, Historical Med      budesonide (PULMICORT) 0.5 mg/2 mL nebulizer solution TAKE 2 MLS (0.5 MG TOTAL) BY NEBULIZATION 2 (TWO) TIMES DAILY. CONTROLLER, Starting Wed 12/27/2017, Until Thu 12/27/2018, Normal      docusate calcium (SURFAK) 240 mg capsule Take 240 mg by mouth 2 (two) times daily., Until Discontinued, Historical Med      furosemide (LASIX) 20 MG tablet TAKE 1 TABLET ORALLY ONCE A DAY AS NEEDED. FOR WEIGHT GAIN BY 2 POUNDS, Historical Med      latanoprost 0.005 % ophthalmic solution PLACE 1 DROP INTO BOTH EYES EVERY EVENING., Normal      memantine (NAMENDA) 10 MG Tab TAKE 1 TABLET (10 MG TOTAL) BY MOUTH 2 (TWO) TIMES DAILY., Starting Tue 10/3/2017, Normal      metoprolol tartrate (LOPRESSOR) 25 MG tablet TAKE 1 TABLET BY MOUTH TWICE A DAY, Normal      NITROSTAT 0.4 mg SL tablet Place 0.4 mg under the tongue every 5 (five) minutes as needed. , Starting 4/24/2015, Until Discontinued, Historical Med      predniSONE (DELTASONE) 20 MG tablet Take 2 tablets (40  mg total) by mouth once daily., Starting Thu 1/11/2018, Normal      !! RANEXA 1,000 mg Tb12 TAKE 1 TABLET ORALLY 2 TIMES A DAY., Historical Med      !! ranolazine (RANEXA) 500 MG Tb12 Take 1,000 mg by mouth 2 (two) times daily. , Historical Med      spironolactone (ALDACTONE) 50 MG tablet Take 50 mg by mouth once daily., Until Discontinued, Historical Med      tamsulosin (FLOMAX) 0.4 mg Cp24 TAKE 1 CAPSULE (0.4 MG TOTAL) BY MOUTH ONCE DAILY., Starting Mon 9/11/2017, Until Tue 9/11/2018, Normal      umeclidinium-vilanterol (ANORO ELLIPTA) 62.5-25 mcg/actuation DsDv Inhale 1 puff into the lungs once daily. Controller, Starting Thu 9/7/2017, Normal      !! valsartan (DIOVAN) 40 MG tablet Take 40 mg by mouth once daily. Take 1 tablet orally once a day if blood pressure top number foes above 100, Until Discontinued, Historical Med      !! valsartan (DIOVAN) 40 MG tablet One po daily prn systolic BP above 100, Normal      VENTOLIN HFA 90 mcg/actuation inhaler INHALE 2 PUFFS INTO THE LUNGS EVERY 6 (SIX) HOURS AS NEEDED FOR WHEEZING., Starting Tue 10/17/2017, Until Wed 10/17/2018, Normal      ZETIA 10 mg tablet Take 10 mg by mouth once daily., Starting 7/25/2016, Until Discontinued, Historical Med      aspirin (ECOTRIN) 81 MG EC tablet Take 81 mg by mouth once daily., Historical Med      clopidogrel (PLAVIX) 75 mg tablet Take 75 mg by mouth once daily., Until Discontinued, Historical Med       !! - Potential duplicate medications found. Please discuss with provider.          Follow Up:  1 week    Discharge Condition:  Stable    Discharge Procedure Orders (must include Diet, Follow-up, Activity)  No discharge procedures on file.     Discharge Procedure Orders (must include Diet, Follow-up, Activity)  No discharge procedures on file.     Discharge Date: 2/6/2018  8:47 AM

## 2018-02-06 NOTE — OP NOTE
Operative Note       Surgery Date: 2/6/2018     Surgeon(s) and Role:     * Frantz Clay MD - Primary    Pre-op Diagnosis:  Hemoptysis [R04.2]  Lesion of lung [R91.1]    Post-op Diagnosis:  Lung cancer     Procedure(s) (LRB):  BRONCHOSCOPY (N/A)    Anesthesia: RN IV Sedation    Procedure in Detail/Findings:  See Leo is a 89 y.o. male presents with hemoptysis and SOB.  With fluoroscopy guidance. ASA = 3. Via Right nares placed scope to the level of the vocal cords. Patient is an out- patient(in pt vs out pt).Patient airways: Mouth opening good/Head/neck /Extension good  Vocal move normal  Subglottic area is clear  Trachea normal   Right Lung see picture + lesion just past the radha ++ endobronchial lesion.        Left Lung entire lower and middle Bronchus intermedius with an endobronchial lesion see picture.  There is severe Bronchial secretions  There is a mass in the LLL lobe/segment and bronchus intermedius  Submucosal  .   50 Blood loss.  Transbronchial Biopsy X's 6 of the LLL Lobe/Segment and 2 Biopsies of the Rihgt lung lesion as well.  Brush and Wash X's 1 of the LLL Lobe/Segment.  Patient tolerated the procedure well no complications.  Impression:  1) Left Lower Lobe lung cancer  2) COPD  3) Hemoptysis  4) Hx/o Smoking    ICD-10-CM ICD-9-CM   1. History of bronchoscopy Z98.890 V45.89     Plan:  1) Await Pathology.  2) Continue Bronchodilators.  3) Await cultures.  4) Return to clinic next Monday at 10 am call [phone]           Specimens     Start     Ordered    02/06/18 0831  Specimen to Pathology - Surgery  Once      02/06/18 0830    02/06/18 0817  Medical Cytology  Once      02/06/18 0816    02/06/18 0817  Medical Cytology  Once     Question Answer Comment   Clinical Information: lung brush    Specific Site: bronchial        02/06/18 0816    02/06/18 0817  Tissue Specimen To Pathology, Cardiology/Pulmonary  Once      02/06/18 0816        Implants: * No implants in log *           Disposition:  PACU - hemodynamically stable.    Attestation:  I performed the procedure.

## 2018-02-06 NOTE — DISCHARGE INSTRUCTIONS
Nothing to eat or drink till 3 hours after procedure.   Once tolerating sips of water okay to drink and eat.    Hold Plavix and Asprin for 2 days after procedure to prevent extra bleeding.    Follow up with physician.

## 2018-02-06 NOTE — H&P
See Leo is a 89 y.o. year old male that's presents with a chief complaint of SOB and Hemoptysis as well as an abnormal CT CHEST for 10 days.Patient with hemoptysis and slight SOB with minimal weight loss . Consulted to evaluate Respiratory status.    Past Medical History:   Diagnosis Date    CAD (coronary artery disease), native coronary artery     Cardiomyopathy     Carotid artery disease     COPD (chronic obstructive pulmonary disease)     Dyslipidemia     Encounter for blood transfusion     Esophageal reflux     GERD (gastroesophageal reflux disease)     GI hemorrhage 10/08/2015    Heart failure     HTN (hypertension), benign     Left ventricular systolic dysfunction     Paroxysmal A-fib     S/P CABG x 4         Past Surgical History:   Procedure Laterality Date    CORONARY ARTERY BYPASS GRAFT      x4    HIP SURGERY      LEG SURGERY      TONSILLECTOMY         Prior to Admission medications    Medication Sig Start Date End Date Taking? Authorizing Provider   metoprolol tartrate (LOPRESSOR) 25 MG tablet TAKE 1 TABLET BY MOUTH TWICE A DAY  Patient taking differently: Take 1/2 tablet orally twice daily 5/15/17  Yes Sarah Olvera NP   albuterol-ipratropium 2.5mg-0.5mg/3mL (DUO-NEB) 0.5 mg-3 mg(2.5 mg base)/3 mL nebulizer solution Take 3 mLs by nebulization every 6 (six) hours as needed for Wheezing. Rescue 9/7/17 9/7/18  Suhail Gonzalez MD   atorvastatin (LIPITOR) 40 MG tablet Take 40 mg by mouth once daily. 6/26/15   Historical Provider, MD   b complex vitamins capsule Take 1 capsule by mouth once daily.    Historical Provider, MD   budesonide (PULMICORT) 0.5 mg/2 mL nebulizer solution TAKE 2 MLS (0.5 MG TOTAL) BY NEBULIZATION 2 (TWO) TIMES DAILY. CONTROLLER 12/27/17 12/27/18  Suhail Gonzalez MD   docusate calcium (SURFAK) 240 mg capsule Take 240 mg by mouth 2 (two) times daily.    Historical Provider, MD   furosemide (LASIX) 20 MG tablet TAKE 1 TABLET ORALLY ONCE A DAY AS NEEDED.  FOR WEIGHT GAIN BY 2 POUNDS 10/8/17   Historical Provider, MD   latanoprost 0.005 % ophthalmic solution PLACE 1 DROP INTO BOTH EYES EVERY EVENING. 1/23/18   Romulo Jin MD   memantine (NAMENDA) 10 MG Tab TAKE 1 TABLET (10 MG TOTAL) BY MOUTH 2 (TWO) TIMES DAILY. 10/3/17   Kit Omalley MD   NITROSTAT 0.4 mg SL tablet Place 0.4 mg under the tongue every 5 (five) minutes as needed.  4/24/15   Historical Provider, MD   predniSONE (DELTASONE) 20 MG tablet Take 2 tablets (40 mg total) by mouth once daily. 1/11/18   Sarah Olvera NP   RANEXA 1,000 mg Tb12 TAKE 1 TABLET ORALLY 2 TIMES A DAY. 1/13/18   Historical Provider, MD   ranolazine (RANEXA) 500 MG Tb12 Take 1,000 mg by mouth 2 (two) times daily.     Historical Provider, MD   spironolactone (ALDACTONE) 50 MG tablet Take 50 mg by mouth once daily.    Historical Provider, MD   tamsulosin (FLOMAX) 0.4 mg Cp24 TAKE 1 CAPSULE (0.4 MG TOTAL) BY MOUTH ONCE DAILY. 9/11/17 9/11/18  Sarah Olvera NP   umeclidinium-vilanterol (ANORO ELLIPTA) 62.5-25 mcg/actuation DsDv Inhale 1 puff into the lungs once daily. Controller 9/7/17   Suhail Gonzalez MD   valsartan (DIOVAN) 40 MG tablet Take 40 mg by mouth once daily. Take 1 tablet orally once a day if blood pressure top number foes above 100    Historical Provider, MD   valsartan (DIOVAN) 40 MG tablet One po daily prn systolic BP above 100 1/31/18   Suhail Gonzalez MD   VENTOLIN HFA 90 mcg/actuation inhaler INHALE 2 PUFFS INTO THE LUNGS EVERY 6 (SIX) HOURS AS NEEDED FOR WHEEZING. 10/17/17 10/17/18  Jessee Chanel MD   ZETIA 10 mg tablet Take 10 mg by mouth once daily. 7/25/16   Historical Provider, MD   aspirin (ECOTRIN) 81 MG EC tablet Take 81 mg by mouth once daily.  2/6/18  Historical Provider, MD   clopidogrel (PLAVIX) 75 mg tablet Take 75 mg by mouth once daily.  2/6/18  Historical Provider, MD       Social History     Social History    Marital status:      Spouse name: N/A    Number of  children: N/A    Years of education: N/A     Occupational History    Not on file.     Social History Main Topics    Smoking status: Former Smoker     Packs/day: 2.00     Years: 20.00     Quit date: 1/1/1970    Smokeless tobacco: Never Used    Alcohol use Yes      Comment: once in a while    Drug use: Unknown    Sexual activity: Not on file     Other Topics Concern    Not on file     Social History Narrative    No narrative on file       History reviewed. No pertinent family history.    Review of patient's allergies indicates:  No Known Allergies Allergies have been reviewed.     ROS: Review of Systems   Constitutional: Positive for malaise/fatigue. Negative for chills, fever and weight loss.   HENT: Negative for sore throat.    Eyes: Negative for double vision and photophobia.   Respiratory: Positive for cough, sputum production (hemoptysis) and shortness of breath.    Cardiovascular: Positive for leg swelling (mild) and PND (occasional). Negative for palpitations.   Gastrointestinal: Negative for abdominal pain and diarrhea.   Genitourinary: Negative for dysuria and frequency.   Musculoskeletal: Positive for myalgias (generalized). Negative for back pain and neck pain.   Skin: Negative.    Neurological: Negative for dizziness, weakness and headaches.   Endo/Heme/Allergies: Does not bruise/bleed easily.   Psychiatric/Behavioral: Negative for memory loss. The patient has insomnia (intermittant ).        PE:   Vitals:    02/06/18 0806 02/06/18 0807 02/06/18 0819 02/06/18 0830   BP:  118/60 (!) 109/43 (!) 111/46   BP Location:  Left arm Left arm Left arm   Patient Position:  Lying Lying Lying   Pulse: 70 70 74 72   Resp: 19 18 18 18   SpO2: (!) 91% (!) 91% (!) 90% (!) 92%    Physical Exam    Alert and orientated X 3   HEENT: Head: Normocephalic no trauma                Ears : Normal Pinna No Drainage no Battles sign                Eyes: Vision Unchanged, No conjunctivitis,No drainage                Neck:  Supple, No JVD,No Abnormal Carotid Pulsations                Throat: No Erythema, No pus,No Swelling,Mallampati score= 2    Chest: Course BS bilaterally with squeaks on the left  Cardiac: RRR S1+ S2 with a -S3: +M = 2/6, No R/H/G  Abdomen: Bowel Sounds are Normal.Soft Abdomen. No organomegaly of Liver,Spleen,or Kidneys   CNS: Non focal and intact. Cranial nerves 2, 346,8,9,10 and 12 are normal.Norrmal gait.Normal posture.  Extremities: No Clubbing,No Cyanosis with oxygen,Positive mild edema of lower extremities Bilateral  Skin: No Rash, No Ulcerative sores,and No cellulitis of the IV site.    Lab Results   Component Value Date    WBC 8.49 01/26/2018    HGB 13.1 (L) 01/26/2018    HCT 40.3 01/26/2018     01/26/2018    CHOL 119 (L) 01/26/2018    TRIG 61 01/26/2018    HDL 48 01/26/2018    ALT 13 01/26/2018    AST 13 01/26/2018     01/26/2018    K 4.8 01/26/2018     01/26/2018    CREATININE 0.8 01/26/2018    BUN 16 01/26/2018    CO2 26 01/26/2018    TSH 1.549 09/07/2016    INR 1.1 01/10/2018    HGBA1C 5.2 10/20/2015     1) Lung cancer Left Lower Lobe and Bronchus intermedius  2) COPD  3) CAD   4)Dysrhymias S/P Pacemaker    1. Bronchoscopy today  2. Collect specimen  3. Await path  Review of patient's allergies indicates:  No Known Allergies Allergies have been reviewed.     Patient cleared for anesthesia (IV Conscious Sedation)ASA 3

## 2018-02-07 LAB
ACID FAST MOD KINY STN SPEC: NORMAL
KOH PREP SPEC: NORMAL

## 2018-02-08 ENCOUNTER — HOSPITAL ENCOUNTER (OUTPATIENT)
Dept: PULMONOLOGY | Facility: HOSPITAL | Age: 83
Discharge: HOME | End: 2018-02-08
Attending: INTERNAL MEDICINE
Payer: MEDICARE

## 2018-02-08 DIAGNOSIS — R06.02 SHORTNESS OF BREATH: ICD-10-CM

## 2018-02-08 DIAGNOSIS — R05.3 COUGH, PERSISTENT: ICD-10-CM

## 2018-02-08 DIAGNOSIS — R91.1 LUNG NODULE, SOLITARY: ICD-10-CM

## 2018-02-08 PROCEDURE — 94060 EVALUATION OF WHEEZING: CPT

## 2018-02-08 PROCEDURE — 94727 GAS DIL/WSHOT DETER LNG VOL: CPT

## 2018-02-08 PROCEDURE — 94729 DIFFUSING CAPACITY: CPT

## 2018-02-08 PROCEDURE — 99900031 HC PATIENT EDUCATION (STAT)

## 2018-02-09 LAB
BACTERIA SPEC AEROBE CULT: NORMAL
GRAM STN SPEC: NORMAL

## 2018-02-19 RX ORDER — LATANOPROST 50 UG/ML
SOLUTION/ DROPS OPHTHALMIC
Qty: 2.5 ML | Refills: 0 | Status: ON HOLD | OUTPATIENT
Start: 2018-02-19 | End: 2018-03-22 | Stop reason: HOSPADM

## 2018-02-22 VITALS
DIASTOLIC BLOOD PRESSURE: 46 MMHG | RESPIRATION RATE: 18 BRPM | SYSTOLIC BLOOD PRESSURE: 111 MMHG | OXYGEN SATURATION: 92 % | HEART RATE: 72 BPM

## 2018-02-26 ENCOUNTER — HOSPITAL ENCOUNTER (OUTPATIENT)
Dept: RADIOLOGY | Facility: HOSPITAL | Age: 83
Discharge: HOME | End: 2018-02-26
Attending: INTERNAL MEDICINE
Payer: MEDICARE

## 2018-02-26 DIAGNOSIS — C34.91 NON-SMALL CELL CANCER OF RIGHT LUNG: ICD-10-CM

## 2018-02-26 DIAGNOSIS — R04.2 HEMOPTYSIS: Primary | ICD-10-CM

## 2018-02-26 PROCEDURE — 70470 CT HEAD/BRAIN W/O & W/DYE: CPT | Mod: TC

## 2018-02-26 PROCEDURE — 70470 CT HEAD/BRAIN W/O & W/DYE: CPT | Mod: 26,,, | Performed by: RADIOLOGY

## 2018-02-26 PROCEDURE — 25500020 PHARM REV CODE 255: Performed by: INTERNAL MEDICINE

## 2018-02-26 RX ADMIN — IOHEXOL 75 ML: 350 INJECTION, SOLUTION INTRAVENOUS at 10:02

## 2018-02-27 ENCOUNTER — HOSPITAL ENCOUNTER (OUTPATIENT)
Dept: RADIOLOGY | Facility: HOSPITAL | Age: 83
Discharge: HOME | End: 2018-02-27
Attending: INTERNAL MEDICINE
Payer: MEDICARE

## 2018-02-27 DIAGNOSIS — R04.2 HEMOPTYSIS: ICD-10-CM

## 2018-02-27 DIAGNOSIS — C34.91 NON-SMALL CELL CANCER OF RIGHT LUNG: ICD-10-CM

## 2018-02-27 PROCEDURE — 78306 BONE IMAGING WHOLE BODY: CPT | Mod: 26,,, | Performed by: RADIOLOGY

## 2018-02-27 PROCEDURE — A9503 TC99M MEDRONATE: HCPCS

## 2018-03-07 LAB — FUNGUS SPEC CULT: NORMAL

## 2018-03-19 ENCOUNTER — HOSPITAL ENCOUNTER (OUTPATIENT)
Facility: HOSPITAL | Age: 83
Discharge: HOSPICE | End: 2018-03-22
Attending: SURGERY | Admitting: INTERNAL MEDICINE
Payer: MEDICARE

## 2018-03-19 DIAGNOSIS — I48.20 CHRONIC ATRIAL FIBRILLATION: ICD-10-CM

## 2018-03-19 DIAGNOSIS — I25.10 SYSTOLIC HEART FAILURE SECONDARY TO CORONARY ARTERY DISEASE: ICD-10-CM

## 2018-03-19 DIAGNOSIS — R53.83 FATIGUE, UNSPECIFIED TYPE: ICD-10-CM

## 2018-03-19 DIAGNOSIS — F03.90 DEMENTIA WITHOUT BEHAVIORAL DISTURBANCE, UNSPECIFIED DEMENTIA TYPE: ICD-10-CM

## 2018-03-19 DIAGNOSIS — E86.0 DEHYDRATION: ICD-10-CM

## 2018-03-19 DIAGNOSIS — R40.4 TRANSIENT ALTERATION OF AWARENESS: ICD-10-CM

## 2018-03-19 DIAGNOSIS — C34.32 MALIGNANT NEOPLASM OF LOWER LOBE OF LEFT LUNG: ICD-10-CM

## 2018-03-19 DIAGNOSIS — I25.10 CARDIOVASCULAR DISEASE: ICD-10-CM

## 2018-03-19 DIAGNOSIS — R06.02 SOB (SHORTNESS OF BREATH): Primary | ICD-10-CM

## 2018-03-19 DIAGNOSIS — I95.9 HYPOTENSION, UNSPECIFIED HYPOTENSION TYPE: ICD-10-CM

## 2018-03-19 DIAGNOSIS — I50.20 SYSTOLIC HEART FAILURE SECONDARY TO CORONARY ARTERY DISEASE: ICD-10-CM

## 2018-03-19 LAB
ALBUMIN SERPL BCP-MCNC: 2.7 G/DL
ALP SERPL-CCNC: 81 U/L
ALT SERPL W/O P-5'-P-CCNC: 14 U/L
ANION GAP SERPL CALC-SCNC: 8 MMOL/L
APTT BLDCRRT: 25.9 SEC
AST SERPL-CCNC: 14 U/L
BASOPHILS # BLD AUTO: 0.01 K/UL
BASOPHILS NFR BLD: 0.1 %
BILIRUB SERPL-MCNC: 1 MG/DL
BNP SERPL-MCNC: 339 PG/ML
BUN SERPL-MCNC: 17 MG/DL
CALCIUM SERPL-MCNC: 9.3 MG/DL
CHLORIDE SERPL-SCNC: 98 MMOL/L
CK MB SERPL-MCNC: 1.3 NG/ML
CK MB SERPL-RTO: 4.1 %
CK SERPL-CCNC: 32 U/L
CK SERPL-CCNC: 32 U/L
CO2 SERPL-SCNC: 26 MMOL/L
CREAT SERPL-MCNC: 1.1 MG/DL
D DIMER PPP IA.FEU-MCNC: 1.83 MG/L FEU
DIFFERENTIAL METHOD: ABNORMAL
EOSINOPHIL # BLD AUTO: 0 K/UL
EOSINOPHIL NFR BLD: 0.2 %
ERYTHROCYTE [DISTWIDTH] IN BLOOD BY AUTOMATED COUNT: 16.2 %
EST. GFR  (AFRICAN AMERICAN): >60 ML/MIN/1.73 M^2
EST. GFR  (NON AFRICAN AMERICAN): 58 ML/MIN/1.73 M^2
GLUCOSE SERPL-MCNC: 116 MG/DL
HCT VFR BLD AUTO: 40.8 %
HGB BLD-MCNC: 13.6 G/DL
INR PPP: 1.1
LYMPHOCYTES # BLD AUTO: 1.1 K/UL
LYMPHOCYTES NFR BLD: 9.7 %
MCH RBC QN AUTO: 29.2 PG
MCHC RBC AUTO-ENTMCNC: 33.3 G/DL
MCV RBC AUTO: 88 FL
MONOCYTES # BLD AUTO: 0.7 K/UL
MONOCYTES NFR BLD: 6.1 %
NEUTROPHILS # BLD AUTO: 9.2 K/UL
NEUTROPHILS NFR BLD: 83.9 %
PLATELET # BLD AUTO: 302 K/UL
PMV BLD AUTO: 9.7 FL
POTASSIUM SERPL-SCNC: 5.1 MMOL/L
PROT SERPL-MCNC: 6.6 G/DL
PROTHROMBIN TIME: 10.9 SEC
RBC # BLD AUTO: 4.65 M/UL
SODIUM SERPL-SCNC: 132 MMOL/L
TROPONIN I SERPL DL<=0.01 NG/ML-MCNC: 0.02 NG/ML
WBC # BLD AUTO: 10.9 K/UL

## 2018-03-19 PROCEDURE — 85730 THROMBOPLASTIN TIME PARTIAL: CPT

## 2018-03-19 PROCEDURE — 93010 ELECTROCARDIOGRAM REPORT: CPT | Mod: ,,, | Performed by: INTERNAL MEDICINE

## 2018-03-19 PROCEDURE — 82553 CREATINE MB FRACTION: CPT

## 2018-03-19 PROCEDURE — 85610 PROTHROMBIN TIME: CPT

## 2018-03-19 PROCEDURE — 25000003 PHARM REV CODE 250: Performed by: SURGERY

## 2018-03-19 PROCEDURE — 36415 COLL VENOUS BLD VENIPUNCTURE: CPT

## 2018-03-19 PROCEDURE — 84484 ASSAY OF TROPONIN QUANT: CPT

## 2018-03-19 PROCEDURE — 94640 AIRWAY INHALATION TREATMENT: CPT

## 2018-03-19 PROCEDURE — 99220 PR INITIAL OBSERVATION CARE,LEVL III: CPT | Mod: AI,,, | Performed by: INTERNAL MEDICINE

## 2018-03-19 PROCEDURE — 27000221 HC OXYGEN, UP TO 24 HOURS

## 2018-03-19 PROCEDURE — 80053 COMPREHEN METABOLIC PANEL: CPT

## 2018-03-19 PROCEDURE — 85025 COMPLETE CBC W/AUTO DIFF WBC: CPT

## 2018-03-19 PROCEDURE — 85379 FIBRIN DEGRADATION QUANT: CPT

## 2018-03-19 PROCEDURE — 94761 N-INVAS EAR/PLS OXIMETRY MLT: CPT

## 2018-03-19 PROCEDURE — G0378 HOSPITAL OBSERVATION PER HR: HCPCS

## 2018-03-19 PROCEDURE — 99285 EMERGENCY DEPT VISIT HI MDM: CPT | Mod: 25

## 2018-03-19 PROCEDURE — 25000242 PHARM REV CODE 250 ALT 637 W/ HCPCS: Performed by: INTERNAL MEDICINE

## 2018-03-19 PROCEDURE — 83880 ASSAY OF NATRIURETIC PEPTIDE: CPT

## 2018-03-19 PROCEDURE — 93005 ELECTROCARDIOGRAM TRACING: CPT

## 2018-03-19 PROCEDURE — 25000242 PHARM REV CODE 250 ALT 637 W/ HCPCS: Performed by: SURGERY

## 2018-03-19 PROCEDURE — 25000003 PHARM REV CODE 250: Performed by: INTERNAL MEDICINE

## 2018-03-19 PROCEDURE — 96360 HYDRATION IV INFUSION INIT: CPT

## 2018-03-19 RX ORDER — PANTOPRAZOLE SODIUM 40 MG/1
40 TABLET, DELAYED RELEASE ORAL DAILY
Status: DISCONTINUED | OUTPATIENT
Start: 2018-03-20 | End: 2018-03-22 | Stop reason: HOSPADM

## 2018-03-19 RX ORDER — ASPIRIN 81 MG/1
81 TABLET ORAL DAILY
Status: ON HOLD | COMMUNITY
End: 2018-03-22 | Stop reason: HOSPADM

## 2018-03-19 RX ORDER — ACETAMINOPHEN 325 MG/1
650 TABLET ORAL EVERY 8 HOURS PRN
Status: DISCONTINUED | OUTPATIENT
Start: 2018-03-19 | End: 2018-03-22 | Stop reason: HOSPADM

## 2018-03-19 RX ORDER — IPRATROPIUM BROMIDE AND ALBUTEROL SULFATE 2.5; .5 MG/3ML; MG/3ML
3 SOLUTION RESPIRATORY (INHALATION)
Status: COMPLETED | OUTPATIENT
Start: 2018-03-19 | End: 2018-03-19

## 2018-03-19 RX ORDER — TAMSULOSIN HYDROCHLORIDE 0.4 MG/1
0.4 CAPSULE ORAL DAILY
Status: DISCONTINUED | OUTPATIENT
Start: 2018-03-20 | End: 2018-03-22 | Stop reason: HOSPADM

## 2018-03-19 RX ORDER — ONDANSETRON 2 MG/ML
4 INJECTION INTRAMUSCULAR; INTRAVENOUS EVERY 8 HOURS PRN
Status: DISCONTINUED | OUTPATIENT
Start: 2018-03-19 | End: 2018-03-22 | Stop reason: HOSPADM

## 2018-03-19 RX ORDER — DOCUSATE SODIUM 100 MG/1
100 CAPSULE, LIQUID FILLED ORAL DAILY PRN
Status: DISCONTINUED | OUTPATIENT
Start: 2018-03-19 | End: 2018-03-22 | Stop reason: HOSPADM

## 2018-03-19 RX ORDER — WITCH HAZEL 50 %
2000 PADS, MEDICATED (EA) TOPICAL 2 TIMES DAILY
Status: ON HOLD | COMMUNITY
End: 2018-03-22 | Stop reason: HOSPADM

## 2018-03-19 RX ORDER — IPRATROPIUM BROMIDE AND ALBUTEROL SULFATE 2.5; .5 MG/3ML; MG/3ML
3 SOLUTION RESPIRATORY (INHALATION) EVERY 8 HOURS PRN
Status: DISCONTINUED | OUTPATIENT
Start: 2018-03-19 | End: 2018-03-22 | Stop reason: HOSPADM

## 2018-03-19 RX ORDER — MEMANTINE HYDROCHLORIDE 10 MG/1
10 TABLET ORAL 2 TIMES DAILY
Status: DISCONTINUED | OUTPATIENT
Start: 2018-03-19 | End: 2018-03-22 | Stop reason: HOSPADM

## 2018-03-19 RX ORDER — NAPROXEN SODIUM 220 MG/1
81 TABLET, FILM COATED ORAL
Status: COMPLETED | OUTPATIENT
Start: 2018-03-19 | End: 2018-03-19

## 2018-03-19 RX ORDER — HYDROCODONE BITARTRATE AND ACETAMINOPHEN 5; 325 MG/1; MG/1
1 TABLET ORAL EVERY 4 HOURS PRN
Status: DISCONTINUED | OUTPATIENT
Start: 2018-03-19 | End: 2018-03-22 | Stop reason: HOSPADM

## 2018-03-19 RX ORDER — SODIUM CHLORIDE 9 MG/ML
INJECTION, SOLUTION INTRAVENOUS CONTINUOUS
Status: DISCONTINUED | OUTPATIENT
Start: 2018-03-19 | End: 2018-03-21

## 2018-03-19 RX ORDER — DOCUSATE SODIUM 100 MG/1
100 CAPSULE, LIQUID FILLED ORAL DAILY PRN
Status: DISCONTINUED | OUTPATIENT
Start: 2018-03-19 | End: 2018-03-19 | Stop reason: SDUPTHER

## 2018-03-19 RX ADMIN — DOCUSATE SODIUM 100 MG: 100 CAPSULE, LIQUID FILLED ORAL at 08:03

## 2018-03-19 RX ADMIN — SODIUM CHLORIDE: 0.9 INJECTION, SOLUTION INTRAVENOUS at 08:03

## 2018-03-19 RX ADMIN — ASPIRIN 81 MG 81 MG: 81 TABLET ORAL at 12:03

## 2018-03-19 RX ADMIN — IPRATROPIUM BROMIDE AND ALBUTEROL SULFATE 3 ML: 2.5; .5 SOLUTION RESPIRATORY (INHALATION) at 11:03

## 2018-03-19 RX ADMIN — SODIUM CHLORIDE 500 ML: 0.9 INJECTION, SOLUTION INTRAVENOUS at 01:03

## 2018-03-19 RX ADMIN — IPRATROPIUM BROMIDE AND ALBUTEROL SULFATE 3 ML: 2.5; .5 SOLUTION RESPIRATORY (INHALATION) at 12:03

## 2018-03-19 RX ADMIN — MEMANTINE 10 MG: 10 TABLET ORAL at 08:03

## 2018-03-19 NOTE — ED PROVIDER NOTES
[facility]                                                 Chief Complaint  89 y.o. male with Shortness of Breath    History of Present Illness  See Leo presents to the emergency room with short of breath and fatigue  Patient collapsed at his wife's  today, appears dehydrated on examination  Patient is hypotensive on arrival, he is not drinking any fluids the last couple days    History is reviewed on the patient     Past Medical History   -- COPD (chronic obstructive pulmonary disease)      -- DM (diabetes mellitus)      -- Dyslipidemia      -- GERD (gastroesophageal reflux disease)      -- Heart failure      -- Paroxysmal a-fib      -- Carotid artery disease      -- S/P CABG x 4      -- HTN (hypertension), benign      -- CAD (coronary artery disease), native coronary artery      -- Cardiomyopathy      -- Left ventricular systolic dysfunction      -- Esophageal reflux          Past Surgical History   -- Hip surgery         -- Coronary artery bypass graft         -- Leg surgery         -- Tonsillectomy         No Known Allergies   History reviewed. No pertinent family history.    Review of Systems and Physical Exam      Review of Systems  -- Constitution - fatigue, weakness, no chills or fever  -- Eyes - no tearing or redness, no visual disturbance  -- Ear, Nose - no tinnitus or earache, no nasal congestion or discharge  -- Mouth,Throat - no sore throat, no toothache, normal voice, normal swallowing  -- Respiratory - shortness of breath, no IBRAHIM, no cough or congestion  -- Cardiovascular - denies chest pain, no palpitations, denies claudication  -- Gastrointestinal - denies abdominal pain, nausea, vomiting, or diarrhea  -- Genitourinary - no dysuria, no denies flank pain, no hematuria or frequency   -- Musculoskeletal - denies back pain, negative for myalgias and arthralgias   -- Neurological - no headache, denies weakness or seizure; no LOC  -- Skin - denies pallor, rash, or  changes in skin. no hives or welts noted    BP (!) 115/46   Pulse 70   Temp 96 °F (35.6 °C) (Axillary)   Resp 17   SpO2 96%      Physical Exam  -- Nursing note and vitals reviewed  -- Head: Atraumatic. Normocephalic. No obvious abnormality  -- Eyes: Pupils are equal and reactive to light. Normal conjunctiva and lids  -- Cardiac: Normal rate, regular rhythm and normal heart sounds  -- Pulmonary: Normal respiratory effort, breath sounds clear to auscultation  -- Abdominal: Soft, no tenderness. Normal bowel sounds. Normal liver edge  -- Musculoskeletal: Normal range of motion, no effusions. Joints stable   -- Neurological: No focal deficits. Showed good interaction with staff  -- Vascular: Posterior tibial, dorsalis pedis and radial pulses 2+ bilaterally      Emergency Room Course      Labs   (L)   K 5.1   CL 98   CO2 26   BUN 17   CREATININE 1.1    (H)   ALKPHOS 81   AST 14   ALT 14   BILITOT 1.0   ALBUMIN 2.7 (L)   PROT 6.6   WBC 10.90   HGB 13.6 (L)   HCT 40.8      CPK 32   CPKMB 1.3   TROPONINI 0.023   INR 1.1    (H)   DDIMER 1.83 (H)   MG 1.6     EKG  -- The EKG findings today were without concerning findings from baseline    Radiology  -- No new findings on chest x-ray, patient has small cell lung cancer    Medications Given  -- aspirin chewable tablet 81 mg (81 mg Oral Given 3/19/18 1241)   -- albuterol-ipratropium 2.5mg-0.5mg/3mL nebulizer solution 3 mL    -- sodium chloride 0.9% bolus 500 mL (0 mLs Intravenous Stopped 3/19/18 1424)     Diagnosis  -- SOB (shortness of breath)  -- Fatigue  -- Dehydration .    Disposition and Plan  -- Disposition: observation  -- Condition: stable  -- Telemetry monitoring  -- Morning labs  -- SCD hoses  -- Home medications  -- Nausea medication when necessary  -- Pain medication when necessary  -- IV Fluids  -- Routine monitoring  -- Bed rest until otherwise stated  -- Low salt diet  -- Protonix for GERD prophylaxis  -- EKG in the morning  --  Aspirin daily  -- Cardiology consult  -- Serial cardiac enzymes     This note is dictated on Dragon Natural Speaking word recognition program.  There are word recognition mistakes that are occasionally missed on review.           Jeb Chávez MD  03/19/18 [id number]

## 2018-03-19 NOTE — ED TRIAGE NOTES
Patient presents to the ER with c/o SOB in the past hour.  Patient was attending wife's  and began to be SOB.

## 2018-03-20 PROBLEM — R06.02 SOB (SHORTNESS OF BREATH): Status: ACTIVE | Noted: 2018-03-20

## 2018-03-20 LAB
ALBUMIN SERPL BCP-MCNC: 2.5 G/DL
ALP SERPL-CCNC: 75 U/L
ALT SERPL W/O P-5'-P-CCNC: 13 U/L
ANION GAP SERPL CALC-SCNC: 10 MMOL/L
AST SERPL-CCNC: 14 U/L
BASOPHILS # BLD AUTO: 0.01 K/UL
BASOPHILS NFR BLD: 0.1 %
BILIRUB SERPL-MCNC: 0.7 MG/DL
BILIRUB UR QL STRIP: NEGATIVE
BUN SERPL-MCNC: 21 MG/DL
CALCIUM SERPL-MCNC: 9.1 MG/DL
CHLORIDE SERPL-SCNC: 101 MMOL/L
CLARITY UR: CLEAR
CO2 SERPL-SCNC: 25 MMOL/L
COLOR UR: YELLOW
CREAT SERPL-MCNC: 1 MG/DL
DIFFERENTIAL METHOD: ABNORMAL
EOSINOPHIL # BLD AUTO: 0 K/UL
EOSINOPHIL NFR BLD: 0.2 %
ERYTHROCYTE [DISTWIDTH] IN BLOOD BY AUTOMATED COUNT: 16.3 %
EST. GFR  (AFRICAN AMERICAN): >60 ML/MIN/1.73 M^2
EST. GFR  (NON AFRICAN AMERICAN): >60 ML/MIN/1.73 M^2
GLUCOSE SERPL-MCNC: 110 MG/DL
GLUCOSE UR QL STRIP: NEGATIVE
HCT VFR BLD AUTO: 39.5 %
HGB BLD-MCNC: 12.9 G/DL
HGB UR QL STRIP: NEGATIVE
KETONES UR QL STRIP: NEGATIVE
LEUKOCYTE ESTERASE UR QL STRIP: NEGATIVE
LYMPHOCYTES # BLD AUTO: 1.2 K/UL
LYMPHOCYTES NFR BLD: 10.2 %
MCH RBC QN AUTO: 28.7 PG
MCHC RBC AUTO-ENTMCNC: 32.7 G/DL
MCV RBC AUTO: 88 FL
MONOCYTES # BLD AUTO: 0.8 K/UL
MONOCYTES NFR BLD: 6.4 %
NEUTROPHILS # BLD AUTO: 9.7 K/UL
NEUTROPHILS NFR BLD: 83.1 %
NITRITE UR QL STRIP: NEGATIVE
PH UR STRIP: 6 [PH] (ref 5–8)
PLATELET # BLD AUTO: 286 K/UL
PMV BLD AUTO: 9.6 FL
POTASSIUM SERPL-SCNC: 5.1 MMOL/L
PROT SERPL-MCNC: 6 G/DL
PROT UR QL STRIP: NEGATIVE
RBC # BLD AUTO: 4.49 M/UL
SODIUM SERPL-SCNC: 136 MMOL/L
SP GR UR STRIP: 1.01 (ref 1–1.03)
TROPONIN I SERPL DL<=0.01 NG/ML-MCNC: 0.01 NG/ML
TROPONIN I SERPL DL<=0.01 NG/ML-MCNC: 0.02 NG/ML
TROPONIN I SERPL DL<=0.01 NG/ML-MCNC: 0.03 NG/ML
TROPONIN I SERPL DL<=0.01 NG/ML-MCNC: 0.03 NG/ML
URN SPEC COLLECT METH UR: NORMAL
UROBILINOGEN UR STRIP-ACNC: NEGATIVE EU/DL
WBC # BLD AUTO: 11.63 K/UL

## 2018-03-20 PROCEDURE — 25500020 PHARM REV CODE 255: Performed by: INTERNAL MEDICINE

## 2018-03-20 PROCEDURE — 93005 ELECTROCARDIOGRAM TRACING: CPT

## 2018-03-20 PROCEDURE — 25000003 PHARM REV CODE 250: Performed by: INTERNAL MEDICINE

## 2018-03-20 PROCEDURE — 94640 AIRWAY INHALATION TREATMENT: CPT

## 2018-03-20 PROCEDURE — 63600175 PHARM REV CODE 636 W HCPCS: Performed by: NURSE PRACTITIONER

## 2018-03-20 PROCEDURE — G0378 HOSPITAL OBSERVATION PER HR: HCPCS

## 2018-03-20 PROCEDURE — 80053 COMPREHEN METABOLIC PANEL: CPT

## 2018-03-20 PROCEDURE — 36415 COLL VENOUS BLD VENIPUNCTURE: CPT

## 2018-03-20 PROCEDURE — 84484 ASSAY OF TROPONIN QUANT: CPT | Mod: 91

## 2018-03-20 PROCEDURE — 25000242 PHARM REV CODE 250 ALT 637 W/ HCPCS: Performed by: INTERNAL MEDICINE

## 2018-03-20 PROCEDURE — 25000003 PHARM REV CODE 250: Performed by: SURGERY

## 2018-03-20 PROCEDURE — 99225 PR SUBSEQUENT OBSERVATION CARE,LEVEL II: CPT | Mod: ,,, | Performed by: INTERNAL MEDICINE

## 2018-03-20 PROCEDURE — 85025 COMPLETE CBC W/AUTO DIFF WBC: CPT

## 2018-03-20 PROCEDURE — 84484 ASSAY OF TROPONIN QUANT: CPT

## 2018-03-20 PROCEDURE — 81003 URINALYSIS AUTO W/O SCOPE: CPT

## 2018-03-20 PROCEDURE — 94761 N-INVAS EAR/PLS OXIMETRY MLT: CPT

## 2018-03-20 PROCEDURE — 27000221 HC OXYGEN, UP TO 24 HOURS

## 2018-03-20 PROCEDURE — 25000003 PHARM REV CODE 250: Performed by: NURSE PRACTITIONER

## 2018-03-20 RX ORDER — ASPIRIN 81 MG/1
243 TABLET ORAL ONCE
Status: COMPLETED | OUTPATIENT
Start: 2018-03-20 | End: 2018-03-20

## 2018-03-20 RX ORDER — ASPIRIN 325 MG
325 TABLET ORAL DAILY
Status: DISCONTINUED | OUTPATIENT
Start: 2018-03-21 | End: 2018-03-21

## 2018-03-20 RX ORDER — ENOXAPARIN SODIUM 100 MG/ML
40 INJECTION SUBCUTANEOUS EVERY 24 HOURS
Status: DISCONTINUED | OUTPATIENT
Start: 2018-03-20 | End: 2018-03-21

## 2018-03-20 RX ORDER — ASPIRIN 81 MG/1
81 TABLET ORAL DAILY
Status: DISCONTINUED | OUTPATIENT
Start: 2018-03-20 | End: 2018-03-20

## 2018-03-20 RX ADMIN — IPRATROPIUM BROMIDE AND ALBUTEROL SULFATE 3 ML: 2.5; .5 SOLUTION RESPIRATORY (INHALATION) at 06:03

## 2018-03-20 RX ADMIN — IOHEXOL 75 ML: 350 INJECTION, SOLUTION INTRAVENOUS at 11:03

## 2018-03-20 RX ADMIN — ASPIRIN 81 MG: 81 TABLET, COATED ORAL at 11:03

## 2018-03-20 RX ADMIN — PANTOPRAZOLE SODIUM 40 MG: 40 TABLET, DELAYED RELEASE ORAL at 08:03

## 2018-03-20 RX ADMIN — SODIUM CHLORIDE: 0.9 INJECTION, SOLUTION INTRAVENOUS at 03:03

## 2018-03-20 RX ADMIN — MEMANTINE 10 MG: 10 TABLET ORAL at 08:03

## 2018-03-20 RX ADMIN — ASPIRIN 243 MG: 81 TABLET, COATED ORAL at 03:03

## 2018-03-20 RX ADMIN — ENOXAPARIN SODIUM 40 MG: 100 INJECTION SUBCUTANEOUS at 04:03

## 2018-03-20 RX ADMIN — MEMANTINE 10 MG: 10 TABLET ORAL at 09:03

## 2018-03-20 RX ADMIN — TAMSULOSIN HYDROCHLORIDE 0.4 MG: 0.4 CAPSULE ORAL at 08:03

## 2018-03-20 NOTE — NURSING
Patient assisted to bathroom and noted to become SOB and incontinent. Patient noted to become very weak and was assisted back to bed.  Rapid Response called.  Patient's V/S as follows.  B/p 119/73, RR 17, O2 91% on room air . Will continue to monitor per shift.

## 2018-03-20 NOTE — HPI
"Patient presented to ER today with change in mental status. He was at his wife's  and family reports he was "glazed over". No LOC but he was not responded to questions. Patient reports he was feeling SOB. He has chronic, intermittent chest pains and had some this morning. Denies palpitations. Reports very dark colored urine but denies dysuria, foul smelling urine. Family reports her mental status is back to baseline. On arrival was hypotensive and Cr 1.1. He did take all of his BP meds this AM. He reports he is feeling better this evening.   "

## 2018-03-20 NOTE — ASSESSMENT & PLAN NOTE
No TTE in our system. I consulted [facility] for records review to tell us what EF is  Holding home ARB, BB and aldactone due to hypotension and dehydration but watch fluid status CLOSELY while giving IVF overnight.No signs of volume overload on exam currently

## 2018-03-20 NOTE — PROGRESS NOTES
"[facility]  Progress Note    Patient Name: See Leo  MRN: [id number]  Patient Class: OP- Observation   Admission Date: 3/19/2018  Length of Stay: 0 days  Attending Physician: Patricia Florian MD  Primary Care Provider: Suhail Gonzalez MD        Subjective:     Principal Problem:Hypotension    HPI:  Patient presented to ER today with change in mental status. He was at his wife's  and family reports he was "glazed over". No LOC but he was not responded to questions. Patient reports he was feeling SOB. He has chronic, intermittent chest pains and had some this morning. Denies palpitations. Reports very dark colored urine but denies dysuria, foul smelling urine. Family reports her mental status is back to baseline. On arrival was hypotensive and Cr 1.1. He did take all of his BP meds this AM. He reports he is feeling better this evening.     Hospital Course:  Pt placed in obs. He reports that he is not doing to well this am. He has been having a lot of accidents with all the fluids. He reports that he was not able to make his wife's  yesterday because he was too weak and fatigued. Heis Na is better 132>>136. He is on NS at 125ml/hr. Pressure better. BNP looks like stable for him 300s.     He still reports that he is SOB this am, but he reports that this is chronic for him. He is using nebs. He also reports that he has lung ca. Noted on CT in . He reports that he sees Dr Clay. D dimer was elevated but no increased SOB/no hypoxia. POX 94-96%. ( hx of parox afib not anticoagulated due to GI bleed)      Review of Systems   Constitutional: Negative for activity change, fatigue, fever and unexpected weight change.   HENT: Negative for congestion, ear pain, hearing loss, rhinorrhea and sore throat.    Eyes: Negative for pain, redness and visual disturbance.   Respiratory: Positive for shortness of breath. Negative for cough and wheezing.    Cardiovascular: Positive for " chest pain. Negative for palpitations and leg swelling.   Gastrointestinal: Negative for abdominal pain, constipation, diarrhea, nausea and vomiting.   Genitourinary: Negative for decreased urine volume, dysuria, frequency and urgency.   Musculoskeletal: Negative for back pain, joint swelling and neck pain.   Skin: Negative for color change, rash and wound.   Neurological: Positive for weakness. Negative for dizziness, light-headedness and headaches.     Objective:     Vital Signs (Most Recent):  Temp: 97 °F (36.1 °C) (03/20/18 0715)  Pulse: 72 (03/20/18 1007)  Resp: 18 (03/20/18 0715)  BP: (!) 92/46 (03/20/18 0715)  SpO2: (!) 94 % (03/20/18 0736) Vital Signs (24h Range):  Temp:  [96 °F (35.6 °C)-98.6 °F (37 °C)] 97 °F (36.1 °C)  Pulse:  [] 72  Resp:  [15-27] 18  SpO2:  [93 %-100 %] 94 %  BP: ()/(43-75) 92/46     Weight: 70.8 kg (156 lb)  Body mass index is 22.38 kg/m².    Physical Exam   Constitutional: He is oriented to person, place, and time. He appears well-developed and well-nourished. No distress.   HENT:   Head: Normocephalic and atraumatic.   Right Ear: External ear normal.   Left Ear: External ear normal.   Eyes: Conjunctivae and EOM are normal. Pupils are equal, round, and reactive to light. Right eye exhibits no discharge. Left eye exhibits no discharge.   Neck: Neck supple. No tracheal deviation present.   Cardiovascular: Normal rate and regular rhythm.    No murmur heard.  Pulmonary/Chest: Effort normal and breath sounds normal. No respiratory distress. He has no wheezes. He has no rales.   Abdominal: Soft. Bowel sounds are normal. He exhibits no distension. There is no tenderness.   Musculoskeletal: He exhibits no edema.   Neurological: He is alert and oriented to person, place, and time. No cranial nerve deficit.   Skin: Skin is warm and dry.   tenting   Psychiatric: He has a normal mood and affect. His behavior is normal.   Nursing note and vitals reviewed.        CRANIAL NERVES     CN  "III, IV, VI   Pupils are equal, round, and reactive to light.  Extraocular motions are normal.        Significant Labs:   CBC:     Recent Labs  Lab 03/19/18  1248 03/20/18  0535   WBC 10.90 11.63   HGB 13.6* 12.9*   HCT 40.8 39.5*    286     CMP:     Recent Labs  Lab 03/19/18  1248 03/20/18  0535   * 136   K 5.1 5.1   CL 98 101   CO2 26 25   * 110   BUN 17 21   CREATININE 1.1 1.0   CALCIUM 9.3 9.1   PROT 6.6 6.0   ALBUMIN 2.7* 2.5*   BILITOT 1.0 0.7   ALKPHOS 81 75   AST 14 14   ALT 14 13   ANIONGAP 8 10   EGFRNONAA 58* >60     Cardiac Markers:     Recent Labs  Lab 03/19/18  1248   *   looks like his baseline 300's    Troponin:     Recent Labs  Lab 03/19/18  1247 03/20/18  0000 03/20/18  0535   TROPONINI 0.023 0.015 0.021     Lab Results   Component Value Date    DDIMER 1.83 (H) 03/19/2018   has dionicio ca, chronic SOB    Lab Results   Component Value Date    INR 1.1 03/19/2018    INR 1.1 01/10/2018    INR 1.1 10/21/2017       Urinalysis    Recent Labs  Lab 03/20/18  0920   COLORU Yellow   SPECGRAV 1.015   PHUR 6.0   PROTEINUA Negative   NITRITE Negative   LEUKOCYTESUR Negative   UROBILINOGEN Negative       All pertinent labs within the past 24 hours have been reviewed.    Significant Imaging:     CXR   COPD changes are noted.  No consolidation or pleural effusions.  The heart is enlarged.  Calcified atheromatous disease affects the aorta.  Left-sided pacemaker device is in place.     3/19 EKG Ventricular-paced rhythm  Abnormal ECG  When compared with ECG of 10-CLAUDETTE-2018 15:33,  No significant change was found    3/20 EKG Ventricular-paced rhythm  Abnormal ECG  No previous ECGs available      Assessment/Plan:      * Hypotension    Likely due to mild dehydration  Starting IVF- reducing today as creat improved (1.1>1.0 but na better 132>>136)  Family reports "heart functioning at 25%"---unsure what his EF is. Will ask [facility] to give us records in the AM. Sees Dr. Anderson    Hold all home BP meds for " today again as bop still running low 92/46, ivf at 75ml/hr          SOB (shortness of breath)    And elevated d diner. Check CTA chest, could be chronic from lung ca and chf/copd but will make sure no new developments   Lab Results   Component Value Date    DDIMER 1.83 (H) 03/19/2018     Has hx of GI bleed after anticoagulation from A fib          Dehydration    IVF 125cc/hr>> reduced to 75ml/hr        Malignant neoplasm of lower lobe of left lung    He has metastatic small cell lung cancer  Living will reviewed and discussed with patient, he is FULL CODE  Consult Dr Clay              Transient alteration of awareness    Now resolved  Likely some dehydration, on IVF          Systolic heart failure secondary to coronary artery disease    No TTE in our system. I consulted [facility] for records review to tell us what EF is  Holding home ARB, BB and aldactone due to hypotension and dehydration but watch fluid status CLOSELY while giving IVF overnight.No signs of volume overload on exam currently    None this am either. Before we overload we reduced the fluids. Bp still low this am 92/46. Watching closely          Dementia    Cont home namenda  Per family, he is at baseline mental status this evening          Chronic atrial fibrillation    Noted in chart--hold BB due to hypotension HR 69-*76  Telemetry- he is paced on EKG/tele            VTE Risk Mitigation         Ordered     enoxaparin injection 40 mg  Daily     Route:  Subcutaneous        03/20/18 0734     IP VTE HIGH RISK PATIENT  Once      03/19/18 1902     Place sequential compression device  Until discontinued      03/19/18 1902              Jessee Chanel MD  Department of Hospital Medicine   [facility]

## 2018-03-20 NOTE — PLAN OF CARE
18 1241   Discharge Assessment   Assessment Type Discharge Planning Reassessment     Discussed plan of care with patient and his son. Patient's wife  last week and the family is working out living arrangements for patient. This patient has a lung mass that the patient may or may not choose to treat. Upon discharge patient's children will take turns staying with him until health care decisions can be ironed out. Should they decide not to treat cancer hospice may be appropriate and patient's son will call us for assistance. We will discuss further tomorrow at discharge.

## 2018-03-20 NOTE — ASSESSMENT & PLAN NOTE
"Likely due to mild dehydration  Starting IVF- reducing today as creat improved (1.1>1.0 but na better 132>>136)  Family reports "heart functioning at 25%"---unsure what his EF is. Will ask [facility] to give us records in the AM. Sees Dr. Anderson    Hold all home BP meds for today again as bop still running low 92/46, ivf at 75ml/hr    "

## 2018-03-20 NOTE — HOSPITAL COURSE
"Pt placed in obs. He reports that he is not doing to well this am. He has been having a lot of accidents with all the fluids. He reports that he was not able to make his wife's  yesterday because he was too weak and fatigued. Heis Na is better 132>>136. He is on NS at 125ml/hr. Pressure better. BNP looks like stable for him 300s.     He still reports that he is SOB this am, but he reports that this is chronic for him. He is using nebs. He also reports that he has lung ca. Noted on CT in . He reports that he sees Dr Clay. D dimer was elevated but no increased SOB/no hypoxia. POX 94-96%. ( hx of parox afib not anticoagulated due to GI bleed)    3/21  Remains on IVF but only at 75ml/hr. //67. Afebrile. Requiring 4L O2 TKS >90%. Cta done yesterday to r/o PE, no PE due to elevated d dimer, but shows ca that has progressed. Dr Clay consulted. Pt seeing Dr Escobar in [facility] for oncology.     3/22  Came in this am to pt coughing up blood. He is anxious and SOB. He is unable to tolerate the venti mask and is satting 86% with 6L NC. Called Dr Gonzalez and ordered 0.5mg ativan and 2mg morphine. We discussed DNR status and hospice yesterday. Pt was agreeable. Son was at bedside and discussed POC with him. All were accepting. He had hospice meeting yesterday and all home supplies set up. He is suppose to go home this am with hospice. After the meds he has calmed, "breathing good now". Resting comfortably. Stable. Called daughter and grand daughter listed as emergency contacts and got voice mails for both.     "

## 2018-03-20 NOTE — ASSESSMENT & PLAN NOTE
Significant upper airway lung cancer R and L seeing dr lindsay in [place]  Wife  recently after hearing her  had lung cancer They wer  70+ years

## 2018-03-20 NOTE — SUBJECTIVE & OBJECTIVE
Review of Systems   Constitutional: Negative for activity change, fatigue, fever and unexpected weight change.   HENT: Negative for congestion, ear pain, hearing loss, rhinorrhea and sore throat.    Eyes: Negative for pain, redness and visual disturbance.   Respiratory: Positive for shortness of breath. Negative for cough and wheezing.    Cardiovascular: Positive for chest pain. Negative for palpitations and leg swelling.   Gastrointestinal: Negative for abdominal pain, constipation, diarrhea, nausea and vomiting.   Genitourinary: Negative for decreased urine volume, dysuria, frequency and urgency.   Musculoskeletal: Negative for back pain, joint swelling and neck pain.   Skin: Negative for color change, rash and wound.   Neurological: Positive for weakness. Negative for dizziness, light-headedness and headaches.     Objective:     Vital Signs (Most Recent):  Temp: 97 °F (36.1 °C) (03/20/18 0715)  Pulse: 72 (03/20/18 1007)  Resp: 18 (03/20/18 0715)  BP: (!) 92/46 (03/20/18 0715)  SpO2: (!) 94 % (03/20/18 0736) Vital Signs (24h Range):  Temp:  [96 °F (35.6 °C)-98.6 °F (37 °C)] 97 °F (36.1 °C)  Pulse:  [] 72  Resp:  [15-27] 18  SpO2:  [93 %-100 %] 94 %  BP: ()/(43-75) 92/46     Weight: 70.8 kg (156 lb)  Body mass index is 22.38 kg/m².    Physical Exam   Constitutional: He is oriented to person, place, and time. He appears well-developed and well-nourished. No distress.   HENT:   Head: Normocephalic and atraumatic.   Right Ear: External ear normal.   Left Ear: External ear normal.   Eyes: Conjunctivae and EOM are normal. Pupils are equal, round, and reactive to light. Right eye exhibits no discharge. Left eye exhibits no discharge.   Neck: Neck supple. No tracheal deviation present.   Cardiovascular: Normal rate and regular rhythm.    No murmur heard.  Pulmonary/Chest: Effort normal and breath sounds normal. No respiratory distress. He has no wheezes. He has no rales.   Abdominal: Soft. Bowel sounds are  normal. He exhibits no distension. There is no tenderness.   Musculoskeletal: He exhibits no edema.   Neurological: He is alert and oriented to person, place, and time. No cranial nerve deficit.   Skin: Skin is warm and dry.   tenting   Psychiatric: He has a normal mood and affect. His behavior is normal.   Nursing note and vitals reviewed.        CRANIAL NERVES     CN III, IV, VI   Pupils are equal, round, and reactive to light.  Extraocular motions are normal.        Significant Labs:   CBC:     Recent Labs  Lab 03/19/18  1248 03/20/18  0535   WBC 10.90 11.63   HGB 13.6* 12.9*   HCT 40.8 39.5*    286     CMP:     Recent Labs  Lab 03/19/18  1248 03/20/18  0535   * 136   K 5.1 5.1   CL 98 101   CO2 26 25   * 110   BUN 17 21   CREATININE 1.1 1.0   CALCIUM 9.3 9.1   PROT 6.6 6.0   ALBUMIN 2.7* 2.5*   BILITOT 1.0 0.7   ALKPHOS 81 75   AST 14 14   ALT 14 13   ANIONGAP 8 10   EGFRNONAA 58* >60     Cardiac Markers:     Recent Labs  Lab 03/19/18  1248   *   looks like his baseline 300's    Troponin:     Recent Labs  Lab 03/19/18  1247 03/20/18  0000 03/20/18  0535   TROPONINI 0.023 0.015 0.021     Lab Results   Component Value Date    DDIMER 1.83 (H) 03/19/2018   has dionicio ca, chronic SOB    Lab Results   Component Value Date    INR 1.1 03/19/2018    INR 1.1 01/10/2018    INR 1.1 10/21/2017       Urinalysis    Recent Labs  Lab 03/20/18  0920   COLORU Yellow   SPECGRAV 1.015   PHUR 6.0   PROTEINUA Negative   NITRITE Negative   LEUKOCYTESUR Negative   UROBILINOGEN Negative       All pertinent labs within the past 24 hours have been reviewed.    Significant Imaging:     CXR   COPD changes are noted.  No consolidation or pleural effusions.  The heart is enlarged.  Calcified atheromatous disease affects the aorta.  Left-sided pacemaker device is in place.     3/19 EKG Ventricular-paced rhythm  Abnormal ECG  When compared with ECG of 10-CLAUDETTE-2018 15:33,  No significant change was found    3/20 EKG  Ventricular-paced rhythm  Abnormal ECG  No previous ECGs available

## 2018-03-20 NOTE — SUBJECTIVE & OBJECTIVE
Past Medical History:   Diagnosis Date    CAD (coronary artery disease), native coronary artery     Cardiomyopathy     Carotid artery disease     COPD (chronic obstructive pulmonary disease)     Dyslipidemia     Encounter for blood transfusion     Esophageal reflux     GERD (gastroesophageal reflux disease)     GI hemorrhage 10/08/2015    Heart failure     HTN (hypertension), benign     Left ventricular systolic dysfunction     Paroxysmal A-fib     S/P CABG x 4        Past Surgical History:   Procedure Laterality Date    CORONARY ARTERY BYPASS GRAFT      x4    HIP SURGERY      LEG SURGERY      TONSILLECTOMY         Review of patient's allergies indicates:  No Known Allergies    No current facility-administered medications on file prior to encounter.      Current Outpatient Prescriptions on File Prior to Encounter   Medication Sig    albuterol-ipratropium 2.5mg-0.5mg/3mL (DUO-NEB) 0.5 mg-3 mg(2.5 mg base)/3 mL nebulizer solution Take 3 mLs by nebulization every 6 (six) hours as needed for Wheezing. Rescue    atorvastatin (LIPITOR) 40 MG tablet Take 40 mg by mouth once daily.    b complex vitamins capsule Take 1 capsule by mouth once daily.    budesonide (PULMICORT) 0.5 mg/2 mL nebulizer solution TAKE 2 MLS (0.5 MG TOTAL) BY NEBULIZATION 2 (TWO) TIMES DAILY. CONTROLLER    docusate calcium (SURFAK) 240 mg capsule Take 240 mg by mouth every evening.     latanoprost 0.005 % ophthalmic solution PLACE 1 DROP INTO BOTH EYES EVERY EVENING.    memantine (NAMENDA) 10 MG Tab TAKE 1 TABLET (10 MG TOTAL) BY MOUTH 2 (TWO) TIMES DAILY.    metoprolol tartrate (LOPRESSOR) 25 MG tablet TAKE 1 TABLET BY MOUTH TWICE A DAY (Patient taking differently: Take 1/2 tablet orally twice daily)    NITROSTAT 0.4 mg SL tablet Place 0.4 mg under the tongue every 5 (five) minutes as needed.     RANEXA 1,000 mg Tb12 TAKE 1 TABLET ORALLY 2 TIMES A DAY.    spironolactone (ALDACTONE) 50 MG tablet Take 50 mg by mouth once  daily.    tamsulosin (FLOMAX) 0.4 mg Cp24 TAKE 1 CAPSULE (0.4 MG TOTAL) BY MOUTH ONCE DAILY.    umeclidinium-vilanterol (ANORO ELLIPTA) 62.5-25 mcg/actuation DsDv Inhale 1 puff into the lungs once daily. Controller    valsartan (DIOVAN) 40 MG tablet Take 40 mg by mouth once daily. Take 1 tablet orally once a day if blood pressure top number foes above 100    VENTOLIN HFA 90 mcg/actuation inhaler INHALE 2 PUFFS INTO THE LUNGS EVERY 6 (SIX) HOURS AS NEEDED FOR WHEEZING.    [DISCONTINUED] furosemide (LASIX) 20 MG tablet TAKE 1 TABLET ORALLY ONCE A DAY AS NEEDED. FOR WEIGHT GAIN BY 2 POUNDS    [DISCONTINUED] predniSONE (DELTASONE) 20 MG tablet Take 2 tablets (40 mg total) by mouth once daily.    [DISCONTINUED] ranolazine (RANEXA) 500 MG Tb12 Take 1,000 mg by mouth 2 (two) times daily.     [DISCONTINUED] valsartan (DIOVAN) 40 MG tablet One po daily prn systolic BP above 100    [DISCONTINUED] ZETIA 10 mg tablet Take 10 mg by mouth once daily.     Family History     None        Social History Main Topics    Smoking status: Former Smoker     Packs/day: 2.00     Years: 20.00     Quit date: 1/1/1970    Smokeless tobacco: Never Used    Alcohol use Yes      Comment: once in a while    Drug use: Unknown    Sexual activity: Not on file     Review of Systems   Constitutional: Negative for activity change, fatigue, fever and unexpected weight change.   HENT: Negative for congestion, ear pain, hearing loss, rhinorrhea and sore throat.    Eyes: Negative for pain, redness and visual disturbance.   Respiratory: Positive for shortness of breath. Negative for cough and wheezing.    Cardiovascular: Positive for chest pain. Negative for palpitations and leg swelling.   Gastrointestinal: Negative for abdominal pain, constipation, diarrhea, nausea and vomiting.   Genitourinary: Negative for decreased urine volume, dysuria, frequency and urgency.   Musculoskeletal: Negative for back pain, joint swelling and neck pain.   Skin:  Negative for color change, rash and wound.   Neurological: Positive for weakness. Negative for dizziness, light-headedness and headaches.     Objective:     Vital Signs (Most Recent):  Temp: 96.2 °F (35.7 °C) (03/19/18 1909)  Pulse: 71 (03/19/18 1909)  Resp: 20 (03/19/18 1909)  BP: (!) 77/43 (03/19/18 1909)  SpO2: (!) 93 % (03/19/18 1909) Vital Signs (24h Range):  Temp:  [96 °F (35.6 °C)-96.2 °F (35.7 °C)] 96.2 °F (35.7 °C)  Pulse:  [68-93] 71  Resp:  [15-27] 20  SpO2:  [93 %-100 %] 93 %  BP: ()/(43-61) 77/43     Weight: 70.8 kg (156 lb)  Body mass index is 22.38 kg/m².    Physical Exam   Constitutional: He is oriented to person, place, and time. He appears well-developed and well-nourished. No distress.   HENT:   Head: Normocephalic and atraumatic.   Right Ear: External ear normal.   Left Ear: External ear normal.   Eyes: Conjunctivae and EOM are normal. Pupils are equal, round, and reactive to light. Right eye exhibits no discharge. Left eye exhibits no discharge.   Neck: Neck supple. No tracheal deviation present.   Cardiovascular: Normal rate and regular rhythm.    No murmur heard.  Pulmonary/Chest: Effort normal and breath sounds normal. No respiratory distress. He has no wheezes. He has no rales.   Abdominal: Soft. Bowel sounds are normal. He exhibits no distension. There is no tenderness.   Musculoskeletal: He exhibits no edema.   Neurological: He is alert and oriented to person, place, and time. No cranial nerve deficit.   Skin: Skin is warm and dry.   tenting   Psychiatric: He has a normal mood and affect. His behavior is normal.   Nursing note and vitals reviewed.        CRANIAL NERVES     CN III, IV, VI   Pupils are equal, round, and reactive to light.  Extraocular motions are normal.        Significant Labs:   CBC:   Recent Labs  Lab 03/19/18  1248   WBC 10.90   HGB 13.6*   HCT 40.8        CMP:   Recent Labs  Lab 03/19/18  1248   *   K 5.1   CL 98   CO2 26   *   BUN 17    CREATININE 1.1   CALCIUM 9.3   PROT 6.6   ALBUMIN 2.7*   BILITOT 1.0   ALKPHOS 81   AST 14   ALT 14   ANIONGAP 8   EGFRNONAA 58*     Cardiac Markers:   Recent Labs  Lab 03/19/18  1248   *     Troponin:   Recent Labs  Lab 03/19/18  1247   TROPONINI 0.023     All pertinent labs within the past 24 hours have been reviewed.    Significant Imaging: I have reviewed all pertinent imaging results/findings within the past 24 hours.

## 2018-03-20 NOTE — CONSULTS
[facility]  Cardiology  Consult Note    Patient Name: See Leo  MRN: [id number]  Admission Date: 3/19/2018  Hospital Length of Stay: 0 days  Code Status: Full Code   Attending Provider: Patricia Florian MD   Consulting Provider: MATTIE Whitmore  Primary Care Physician: Suhail Gonzalez MD  Principal Problem:Hypotension    Patient information was obtained from patient, past medical records and ER records.     Inpatient consult to [facility]  Consult performed by: DODIE NAZARIO  Consult ordered by: PATRICIA FLORIAN        Subjective:     Chief Complaint:  SOB    HPI: 89 year old with recent death of spouse presents to the ED with AMS and SOB. He was found to be hypotensive. He continues to endorse some SOB this morning.     Past Medical History:   Diagnosis Date    CAD (coronary artery disease), native coronary artery     Cardiomyopathy     Carotid artery disease     COPD (chronic obstructive pulmonary disease)     Dyslipidemia     Encounter for blood transfusion     Esophageal reflux     GERD (gastroesophageal reflux disease)     GI hemorrhage 10/08/2015    Heart failure     HTN (hypertension), benign     Left ventricular systolic dysfunction     Paroxysmal A-fib     S/P CABG x 4        Past Surgical History:   Procedure Laterality Date    CORONARY ARTERY BYPASS GRAFT      x4    HIP SURGERY      LEG SURGERY      TONSILLECTOMY         Review of patient's allergies indicates:  No Known Allergies    No current facility-administered medications on file prior to encounter.      Current Outpatient Prescriptions on File Prior to Encounter   Medication Sig    albuterol-ipratropium 2.5mg-0.5mg/3mL (DUO-NEB) 0.5 mg-3 mg(2.5 mg base)/3 mL nebulizer solution Take 3 mLs by nebulization every 6 (six) hours as needed for Wheezing. Rescue    atorvastatin (LIPITOR) 40 MG tablet Take 40 mg by mouth once daily.    b complex vitamins capsule Take 1 capsule by mouth once daily.     budesonide (PULMICORT) 0.5 mg/2 mL nebulizer solution TAKE 2 MLS (0.5 MG TOTAL) BY NEBULIZATION 2 (TWO) TIMES DAILY. CONTROLLER    docusate calcium (SURFAK) 240 mg capsule Take 240 mg by mouth every evening.     latanoprost 0.005 % ophthalmic solution PLACE 1 DROP INTO BOTH EYES EVERY EVENING.    memantine (NAMENDA) 10 MG Tab TAKE 1 TABLET (10 MG TOTAL) BY MOUTH 2 (TWO) TIMES DAILY.    metoprolol tartrate (LOPRESSOR) 25 MG tablet TAKE 1 TABLET BY MOUTH TWICE A DAY (Patient taking differently: Take 1/2 tablet orally twice daily)    NITROSTAT 0.4 mg SL tablet Place 0.4 mg under the tongue every 5 (five) minutes as needed.     RANEXA 1,000 mg Tb12 TAKE 1 TABLET ORALLY 2 TIMES A DAY.    spironolactone (ALDACTONE) 50 MG tablet Take 50 mg by mouth once daily.    tamsulosin (FLOMAX) 0.4 mg Cp24 TAKE 1 CAPSULE (0.4 MG TOTAL) BY MOUTH ONCE DAILY.    umeclidinium-vilanterol (ANORO ELLIPTA) 62.5-25 mcg/actuation DsDv Inhale 1 puff into the lungs once daily. Controller    valsartan (DIOVAN) 40 MG tablet Take 40 mg by mouth once daily. Take 1 tablet orally once a day if blood pressure top number foes above 100    VENTOLIN HFA 90 mcg/actuation inhaler INHALE 2 PUFFS INTO THE LUNGS EVERY 6 (SIX) HOURS AS NEEDED FOR WHEEZING.     Family History     None        Social History Main Topics    Smoking status: Former Smoker     Packs/day: 2.00     Years: 20.00     Quit date: 1/1/1970    Smokeless tobacco: Never Used    Alcohol use Yes      Comment: once in a while    Drug use: Unknown    Sexual activity: Not on file     ROS   Constitutional: Generalized weakness   Eyes: Negative    Respiratory: SOB    Cardiovascular: Negative   Gastrointestinal: Negative   Genitourinary: Negative   Musculoskeletal: Negative   Skin: Negative .   Neurological: AMS  Objective:     Vital Signs (Most Recent):  Temp: 97 °F (36.1 °C) (03/20/18 0715)  Pulse: 76 (03/20/18 0839)  Resp: 18 (03/20/18 0715)  BP: (!) 92/46 (03/20/18 0715)  SpO2: (!)  94 % (03/20/18 0736) Vital Signs (24h Range):  Temp:  [96 °F (35.6 °C)-98.6 °F (37 °C)] 97 °F (36.1 °C)  Pulse:  [] 76  Resp:  [15-27] 18  SpO2:  [93 %-100 %] 94 %  BP: ()/(43-75) 92/46     Weight: 70.8 kg (156 lb)  Body mass index is 22.38 kg/m².    SpO2: (!) 94 %  O2 Device (Oxygen Therapy): nasal cannula      Intake/Output Summary (Last 24 hours) at 03/20/18 0940  Last data filed at 03/20/18 0000   Gross per 24 hour   Intake                0 ml   Output              500 ml   Net             -500 ml       Lines/Drains/Airways     Peripheral Intravenous Line                 Peripheral IV - Single Lumen 03/19/18 1243 Right Antecubital less than 1 day                Physical Exam  General appearance: alert, appears stated age and cooperative  Head: Normocephalic, without obvious abnormality, atraumatic  Eyes: conjunctivae/corneas clear. PERRL  Neck: no carotid bruit, no JVD and supple, symmetrical, trachea midline  Lungs: BBS diminished in bases, normal respiratory effort  Chest Wall: no tenderness  Heart: regular rate and rhythm, S1, S2 normal, no murmur, click, rub or gallop  Abdomen: soft, non-tender; bowel sounds normal; no masses,  no organomegaly  Extremities: Extremities normal, atraumatic, no cyanosis, clubbing, or edema  Pulses: Dorsalis Pedis R: 2+ (normal)/L: 2+ (normal)  Skin: Skin color, texture, turgor normal. No rashes or lesions  Neurologic: Normal mood and affect  Alert and oriented X 3  Significant Labs:   Blood Culture: No results for input(s): LABBLOO in the last 48 hours., BMP:   Recent Labs  Lab 03/19/18  1248 03/20/18  0535   * 110   * 136   K 5.1 5.1   CL 98 101   CO2 26 25   BUN 17 21   CREATININE 1.1 1.0   CALCIUM 9.3 9.1   , CMP   Recent Labs  Lab 03/19/18  1248 03/20/18  0535   * 136   K 5.1 5.1   CL 98 101   CO2 26 25   * 110   BUN 17 21   CREATININE 1.1 1.0   CALCIUM 9.3 9.1   PROT 6.6 6.0   ALBUMIN 2.7* 2.5*   BILITOT 1.0 0.7   ALKPHOS 81 75   AST  14 14   ALT 14 13   ANIONGAP 8 10   ESTGFRAFRICA >60 >60   EGFRNONAA 58* >60   , CBC   Recent Labs  Lab 03/19/18  1248 03/20/18  0535   WBC 10.90 11.63   HGB 13.6* 12.9*   HCT 40.8 39.5*    286   , INR   Recent Labs  Lab 03/19/18  1247   INR 1.1   , Lipid Panel No results for input(s): CHOL, HDL, LDLCALC, TRIG, CHOLHDL in the last 48 hours.,   Pathology Results  (Last 10 years)    None       and Troponin   Recent Labs  Lab 03/19/18  1247 03/20/18  0000 03/20/18  0535   TROPONINI 0.023 0.015 0.021       Significant Imaging: Cardiac Cath: 7/14, CT scan: CT ABDOMEN PELVIS WITH CONTRAST: No results found for this visit on 03/19/18., Echocardiogram: 2D echo with color flow doppler: No results found for this or any previous visit., EKG:, Stress Test:and X-Ray: CXR: X-Ray Chest 1 View (CXR):   Results for orders placed or performed during the hospital encounter of 03/19/18   X-Ray Chest 1 View    Narrative    Chest, 1 view: COPD changes are noted.  No consolidation or pleural effusions.  The heart is enlarged.  Calcified atheromatous disease affects the aorta.  Left-sided pacemaker device is in place.    Impression     As above.      Electronically signed by: Eneida Montes De Oca MD  Date:     03/19/18  Time:    13:36      Assessment and Plan:     Active Diagnoses:    Diagnosis Date Noted POA    PRINCIPAL PROBLEM:  Hypotension [I95.9] 08/26/2016 Yes    SOB (shortness of breath) [R06.02] 03/20/2018 No    Dehydration [E86.0]  Yes    Malignant neoplasm of lower lobe of left lung [C34.32] 02/06/2018 Yes    Transient alteration of awareness [R40.4] 08/16/2017 Yes    Systolic heart failure secondary to coronary artery disease [I50.20, I25.10] 08/28/2016 Yes    Chronic atrial fibrillation [I48.2] 08/26/2016 Yes    Dementia [F03.90] 08/26/2016 Yes      Problems Resolved During this Admission:    Diagnosis Date Noted Date Resolved POA       VTE Risk Mitigation         Ordered     enoxaparin injection 40 mg  Daily     Route:   Subcutaneous        03/20/18 0734     IP VTE HIGH RISK PATIENT  Once      03/19/18 1902     Place sequential compression device  Until discontinued      03/19/18 1902        Current Facility-Administered Medications   Medication    0.9%  NaCl infusion    acetaminophen tablet 650 mg    albuterol-ipratropium 2.5mg-0.5mg/3mL nebulizer solution 3 mL    docusate sodium capsule 100 mg    enoxaparin injection 40 mg    hydrocodone-acetaminophen 5-325mg per tablet 1 tablet    memantine tablet 10 mg    ondansetron injection 4 mg    pantoprazole EC tablet 40 mg    promethazine (PHENERGAN) 12.5 mg in dextrose 5 % 50 mL IVPB    tamsulosin 24 hr capsule 0.4 mg     Frail 89 recent death of wife. At baseline with chronic low BP  COPD  ICMO s/p CRTD- TTE 1/23/18- EF 30%2+MR, 2+AR, 2+DC, PAP 42  MPI-7/11/16- no reversible ischemia. CAD failed PCI 7/14  Marginal BPs renal function at baseline.   Moderate Carotid disease    PLAN:remains full code  Low dose ARB as tolerated  May go home with HHN  ecasa 81 mg atorvastatin and aldcatone low dose as tolerated    Pablo Pleitez, ROGERSP  Cardiology   [facility]  I attest that I have personally seen and examined this patient. I have reviewed and discussed the management in detail as outlined above.

## 2018-03-20 NOTE — CONSULTS
[facility]  Pulmonology  Consult Note    Patient Name: See Leo  MRN: [id number]  Admission Date: 3/19/2018  Hospital Length of Stay: 0 days  Code Status: Full Code  Attending Physician: Patricia Florian MD  Primary Care Provider: Suhail Gonzalez MD   Principal Problem: Hypotension    Consults  Subjective:     HPI:  See Leo is a 89 y.o. year old male that's presents with a chief complaint of SOB and Wheezing for 30 days.Recently Diagnosed with Non-Small Cell lung cancer(see pictures from bronchoscopy) family was told 10 days ago wife was there and was inspirational at that time for him stating we will get through this. they had gone to see Dr [facility] in [place] last week and he was/is to do Radiation however Mr alvarez's wife had a massive heart attack and  just after seeing the oncologist.He was at the  and got overwhelmed very SOB with drop of O2 saturation into the 70's and was quickly brought to hospital. Consulted to evaluate Respiratory status.    Past Medical History:   Diagnosis Date    CAD (coronary artery disease), native coronary artery     Cardiomyopathy     Carotid artery disease     COPD (chronic obstructive pulmonary disease)     Dyslipidemia     Encounter for blood transfusion     Esophageal reflux     GERD (gastroesophageal reflux disease)     GI hemorrhage 10/08/2015    Heart failure     HTN (hypertension), benign     Left ventricular systolic dysfunction     Paroxysmal A-fib     S/P CABG x 4         Past Surgical History:   Procedure Laterality Date    CORONARY ARTERY BYPASS GRAFT      x4    HIP SURGERY      LEG SURGERY      TONSILLECTOMY         Prior to Admission medications    Medication Sig Start Date End Date Taking? Authorizing Provider   albuterol-ipratropium 2.5mg-0.5mg/3mL (DUO-NEB) 0.5 mg-3 mg(2.5 mg base)/3 mL nebulizer solution Take 3 mLs by nebulization every 6 (six) hours as needed for Wheezing. Rescue 17  Yes Suhail Gonzalez MD   aspirin (ECOTRIN) 81 MG EC tablet Take 81 mg by mouth once daily.   Yes Historical Provider, MD   atorvastatin (LIPITOR) 40 MG tablet Take 40 mg by mouth once daily. 6/26/15  Yes Historical Provider, MD   b complex vitamins capsule Take 1 capsule by mouth once daily.   Yes Historical Provider, MD   budesonide (PULMICORT) 0.5 mg/2 mL nebulizer solution TAKE 2 MLS (0.5 MG TOTAL) BY NEBULIZATION 2 (TWO) TIMES DAILY. CONTROLLER 12/27/17 12/27/18 Yes Suhail Gonzalez MD   cyanocobalamin 2000 MCG tablet Take 2,000 mcg by mouth 2 (two) times daily.   Yes Historical Provider, MD   docusate calcium (SURFAK) 240 mg capsule Take 240 mg by mouth every evening.    Yes Historical Provider, MD   latanoprost 0.005 % ophthalmic solution PLACE 1 DROP INTO BOTH EYES EVERY EVENING. 2/19/18  Yes Romulo Jin MD   memantine (NAMENDA) 10 MG Tab TAKE 1 TABLET (10 MG TOTAL) BY MOUTH 2 (TWO) TIMES DAILY. 10/3/17  Yes Kit Omalley MD   metoprolol tartrate (LOPRESSOR) 25 MG tablet TAKE 1 TABLET BY MOUTH TWICE A DAY  Patient taking differently: Take 1/2 tablet orally twice daily 5/15/17  Yes Sarah Olvera NP   NITROSTAT 0.4 mg SL tablet Place 0.4 mg under the tongue every 5 (five) minutes as needed.  4/24/15  Yes Historical Provider, MD   RANEXA 1,000 mg Tb12 TAKE 1 TABLET ORALLY 2 TIMES A DAY. 1/13/18  Yes Historical Provider, MD   spironolactone (ALDACTONE) 50 MG tablet Take 50 mg by mouth once daily.   Yes Historical Provider, MD   tamsulosin (FLOMAX) 0.4 mg Cp24 TAKE 1 CAPSULE (0.4 MG TOTAL) BY MOUTH ONCE DAILY. 9/11/17 9/11/18 Yes Sarah Olvera NP   umeclidinium-vilanterol (ANORO ELLIPTA) 62.5-25 mcg/actuation DsDv Inhale 1 puff into the lungs once daily. Controller 9/7/17  Yes Suhail Gonzalez MD   valsartan (DIOVAN) 40 MG tablet Take 40 mg by mouth once daily. Take 1 tablet orally once a day if blood pressure top number foes above 100   Yes Historical Provider, MD   VENTOLIN HFA  90 mcg/actuation inhaler INHALE 2 PUFFS INTO THE LUNGS EVERY 6 (SIX) HOURS AS NEEDED FOR WHEEZING. 10/17/17 10/17/18 Yes Jessee Chanel MD       Social History     Social History    Marital status:      Spouse name: N/A    Number of children: N/A    Years of education: N/A     Occupational History    Not on file.     Social History Main Topics    Smoking status: Former Smoker     Packs/day: 2.00     Years: 20.00     Quit date: 1/1/1970    Smokeless tobacco: Never Used    Alcohol use Yes      Comment: once in a while    Drug use: Unknown    Sexual activity: Not on file     Other Topics Concern    Not on file     Social History Narrative    No narrative on file       History reviewed. No pertinent family history.    Review of patient's allergies indicates:  No Known Allergies Allergies have been reviewed.     ROS: ROS    PE:   Vitals:    03/20/18 0715 03/20/18 0736 03/20/18 0839 03/20/18 1007   BP: (!) 92/46      BP Location: Left arm      Patient Position: Lying      Pulse: 66  76 72   Resp: 18      Temp: 97 °F (36.1 °C)      TempSrc: Oral      SpO2: (!) 94% (!) 94%     Weight:       Height:        Physical Exam    Alert and orientated X 3   HEENT: Head: Normocephalic no trauma                Ears : Normal Pinna No Drainage no Battles sign                Eyes: Vision Unchanged, No conjunctivitis,No drainage                Neck: Supple, No JVD,No Abnormal Carotid Pulsations                Throat: No Erythema, No pus,No Swelling,Mallampati score= 3    Chest: course BS bilaterally with poor air entry + wheeze  Cardiac: RRR S1+ S2 with a -S3: +M = 2/6, No R/H/G  Abdomen: Bowel Sounds are Normal.Soft Abdomen. No organomegaly of Liver,Spleen,or Kidneys   CNS: Non focal and intact. Cranial nerves 2, 346,8,9,10 and 12 are normal.Norrmal gait.Normal posture.  Extremities: No Clubbing,No Cyanosis with oxygen,Positive mild edema of lower extremities Bilateral  Skin: No Rash, No Ulcerative sores,and No  cellulitis of the IV site.    Lab Results   Component Value Date    WBC 11.63 2018    HGB 12.9 (L) 2018    HCT 39.5 (L) 2018     2018    CHOL 119 (L) 2018    TRIG 61 2018    HDL 48 2018    ALT 13 2018    AST 14 2018     2018    K 5.1 2018     2018    CREATININE 1.0 2018    BUN 21 2018    CO2 25 2018    TSH 1.549 2016    INR 1.1 2018    HGBA1C 5.2 10/20/2015               Assessment/Plan:     * Hypotension    Secondary to dehydration        SOB (shortness of breath)    Secondary to age COPD and lung cancer non small cell        Dehydration    Secondary to intake and mourning        Malignant neoplasm of lower lobe of left lung    Significant upper airway lung cancer R and L seeing dr lindsay in [place]  Wife  recently after hearing her  had lung cancer They wer  70+ years        Acute chest pain    Stable         Systolic heart failure secondary to coronary artery disease    Stable         Dementia    mild        Chronic atrial fibrillation    With rapid vent              Thank you for your consult. I will follow-up with patient. Please contact us if you have any additional questions.   no Pe cancer Left hilum has enlarged  Frantz Clay MD  Pulmonology  [facility]

## 2018-03-20 NOTE — ASSESSMENT & PLAN NOTE
"Likely due to mild dehydration  Starting IVF  Family reports "heart functioning at 25%"---unsure what his EF is. Will ask [facility] to give us records in the AM. Sees Dr. Anderson    If EF is 25% will need to monitor fluid status closely but he has dry mucous membranes and tenting on exam so I think fluids are appropriate    Hold all christin eBP meds    "

## 2018-03-20 NOTE — PROGRESS NOTES
"[facility] Hospital Medicine  Progress Note    Patient Name: See Leo  MRN: [id number]  Patient Class: OP- Observation   Admission Date: 3/19/2018  Length of Stay: 0 days  Attending Physician: Patricia Florian MD  Primary Care Provider: Suhail Gonzalez MD        Subjective:     Principal Problem:Hypotension    HPI:  Patient presented to ER today with change in mental status. He was at his wife's  and family reports he was "glazed over". No LOC but he was not responded to questions. Patient reports he was feeling SOB. He has chronic, intermittent chest pains and had some this morning. Denies palpitations. Reports very dark colored urine but denies dysuria, foul smelling urine. Family reports her mental status is back to baseline. On arrival was hypotensive and Cr 1.1. He did take all of his BP meds this AM. He reports he is feeling better this evening.     Hospital Course:  Pt placed ion obs. He reports that he is not doing to well this am. He has been having a lot of accidents with all the fluids. He reports that he was not able to make his wife's  yesterday because he was too weak and fatigued. Heis Na is better 132>>136. He is on NS at 125ml/hr. Pressure better. BNP looks like stable for him 300s.     He still reports that he is SOB this am, but he reports that this is chronic for him. He is using nebs. He also reports that he has lung ca. Noted on CT in . He reports that he sees Dr Clay. D dimer was elevated but no increased SOB/no hypoxia. POX 94-96%. ( hx of parox afib not anticoagulated due to GI bleed)      Review of Systems   Constitutional: Negative for activity change, fatigue, fever and unexpected weight change.   HENT: Negative for congestion, ear pain, hearing loss, rhinorrhea and sore throat.    Eyes: Negative for pain, redness and visual disturbance.   Respiratory: Positive for shortness of breath. Negative for cough and wheezing.    Cardiovascular: Positive " for chest pain. Negative for palpitations and leg swelling.   Gastrointestinal: Negative for abdominal pain, constipation, diarrhea, nausea and vomiting.   Genitourinary: Negative for decreased urine volume, dysuria, frequency and urgency.   Musculoskeletal: Negative for back pain, joint swelling and neck pain.   Skin: Negative for color change, rash and wound.   Neurological: Positive for weakness. Negative for dizziness, light-headedness and headaches.     Objective:     Vital Signs (Most Recent):  Temp: 97 °F (36.1 °C) (03/20/18 0715)  Pulse: 76 (03/20/18 0839)  Resp: 18 (03/20/18 0715)  BP: (!) 92/46 (03/20/18 0715)  SpO2: (!) 94 % (03/20/18 0736) Vital Signs (24h Range):  Temp:  [96 °F (35.6 °C)-98.6 °F (37 °C)] 97 °F (36.1 °C)  Pulse:  [] 76  Resp:  [15-27] 18  SpO2:  [93 %-100 %] 94 %  BP: ()/(43-75) 92/46     Weight: 70.8 kg (156 lb)  Body mass index is 22.38 kg/m².    Physical Exam   Constitutional: He is oriented to person, place, and time. He appears well-developed and well-nourished. No distress.   HENT:   Head: Normocephalic and atraumatic.   Right Ear: External ear normal.   Left Ear: External ear normal.   Eyes: Conjunctivae and EOM are normal. Pupils are equal, round, and reactive to light. Right eye exhibits no discharge. Left eye exhibits no discharge.   Neck: Neck supple. No tracheal deviation present.   Cardiovascular: Normal rate and regular rhythm.    No murmur heard.  Pulmonary/Chest: Effort normal and breath sounds normal. No respiratory distress. He has no wheezes. He has no rales.   Abdominal: Soft. Bowel sounds are normal. He exhibits no distension. There is no tenderness.   Musculoskeletal: He exhibits no edema.   Neurological: He is alert and oriented to person, place, and time. No cranial nerve deficit.   Skin: Skin is warm and dry.   tenting   Psychiatric: He has a normal mood and affect. His behavior is normal.   Nursing note and vitals reviewed.        CRANIAL NERVES  "    CN III, IV, VI   Pupils are equal, round, and reactive to light.  Extraocular motions are normal.        Significant Labs:   CBC:     Recent Labs  Lab 03/19/18  1248 03/20/18  0535   WBC 10.90 11.63   HGB 13.6* 12.9*   HCT 40.8 39.5*    286     CMP:     Recent Labs  Lab 03/19/18  1248 03/20/18  0535   * 136   K 5.1 5.1   CL 98 101   CO2 26 25   * 110   BUN 17 21   CREATININE 1.1 1.0   CALCIUM 9.3 9.1   PROT 6.6 6.0   ALBUMIN 2.7* 2.5*   BILITOT 1.0 0.7   ALKPHOS 81 75   AST 14 14   ALT 14 13   ANIONGAP 8 10   EGFRNONAA 58* >60     Cardiac Markers:     Recent Labs  Lab 03/19/18  1248   *   looks like his baseline 300's    Troponin:     Recent Labs  Lab 03/19/18  1247 03/20/18  0000 03/20/18  0535   TROPONINI 0.023 0.015 0.021     Lab Results   Component Value Date    DDIMER 1.83 (H) 03/19/2018   has dionicio ca, chronic SOB    Lab Results   Component Value Date    INR 1.1 03/19/2018    INR 1.1 01/10/2018    INR 1.1 10/21/2017       Urinalysis    Recent Labs  Lab 03/20/18  0920   COLORU Yellow   SPECGRAV 1.015   PHUR 6.0   PROTEINUA Negative   NITRITE Negative   LEUKOCYTESUR Negative   UROBILINOGEN Negative       All pertinent labs within the past 24 hours have been reviewed.    Significant Imaging:     CXR   COPD changes are noted.  No consolidation or pleural effusions.  The heart is enlarged.  Calcified atheromatous disease affects the aorta.  Left-sided pacemaker device is in place.     3/19 EKG Ventricular-paced rhythm  Abnormal ECG  When compared with ECG of 10-CLAUDETTE-2018 15:33,  No significant change was found    3/20 EKG Ventricular-paced rhythm  Abnormal ECG  No previous ECGs available    Assessment/Plan:      * Hypotension    Likely due to mild dehydration  Starting IVF- reducing today as creat improved (1.1>1.0 but na better 132>>136)  Family reports "heart functioning at 25%"---unsure what his EF is. Will ask [facility] to give us records in the AM. Sees Dr. Anderson    Hold all home BP meds " for today again as bop still running low 92/46, ivf at 75ml/hr          SOB (shortness of breath)    And elevated d diner. Check CTA chest, could be chronic from lung ca and chf/copd but will make sure no new developments   Lab Results   Component Value Date    DDIMER 1.83 (H) 03/19/2018     Has hx of GI bleed after anticoagulation from A fib          Dehydration    IVF 125cc/hr>> reduced to 75ml/hr        Malignant neoplasm of lower lobe of left lung    He has metastatic small cell lung cancer  Living will reviewed and discussed with patient, he is FULL CODE  Consult Dr Clay              Transient alteration of awareness    Now resolved  Likely some dehydration, on IVF          Systolic heart failure secondary to coronary artery disease    No TTE in our system. I consulted [facility] for records review to tell us what EF is  Holding home ARB, BB and aldactone due to hypotension and dehydration but watch fluid status CLOSELY while giving IVF overnight.No signs of volume overload on exam currently    None this am either. Before we overload we reduced the fluids. Bp still low this am 92/46. Watching closely          Dementia    Cont home namenda  Per family, he is at baseline mental status this evening          Chronic atrial fibrillation    Noted in chart--hold BB due to hypotension HR 69-*76  Telemetry- he is paced on EKG/tele            VTE Risk Mitigation         Ordered     enoxaparin injection 40 mg  Daily     Route:  Subcutaneous        03/20/18 0734     IP VTE HIGH RISK PATIENT  Once      03/19/18 1902     Place sequential compression device  Until discontinued      03/19/18 1902              Jessee Chanel MD  Department of Hospital Medicine   [facility]

## 2018-03-20 NOTE — ASSESSMENT & PLAN NOTE
And elevated d diner. Check CTA chest, could be chronic from lung ca and chf/copd but will make sure no new developments   Lab Results   Component Value Date    DDIMER 1.83 (H) 03/19/2018     Has hx of GI bleed after anticoagulation from A fib

## 2018-03-20 NOTE — PLAN OF CARE
18 1024   Discharge Assessment   Assessment Type Discharge Planning Assessment   Confirmed/corrected address and phone number on facesheet? Yes   Assessment information obtained from? Patient;Medical Record   Expected Length of Stay (days) 2   Communicated expected length of stay with patient/caregiver yes   Prior to hospitilization cognitive status: Alert/Oriented   Prior to hospitalization functional status: Assistive Equipment;Needs Assistance   Current cognitive status: Alert/Oriented   Current Functional Status: Assistive Equipment;Needs Assistance   Lives With alone  (Pt's wife recently . )   Able to Return to Prior Arrangements unable to determine at this time (comments)   Is patient able to care for self after discharge? Unable to determine at this time (comments)   Who are your caregiver(s) and their phone number(s)? Monisha Cornejo [phone]   Patient's perception of discharge disposition home or selfcare;hospice/home   Readmission Within The Last 30 Days no previous admission in last 30 days   Patient currently being followed by outpatient case management? No   Patient currently receives any other outside agency services? No   Equipment Currently Used at Home walker, rolling   Do you have any problems affording any of your prescribed medications? No   Is the patient taking medications as prescribed? yes   Does the patient have transportation home? Yes   Transportation Available family or friend will provide   Does the patient receive services at the [facility]? No   Discharge Plan A Home with family;Home Health   Discharge Plan B Hospice/home   Patient/Family In Agreement With Plan yes     The pt is a 89 year old male who was admitted with dehydration. The pt's wife recently . Pt's family will stay with pt and take turns to care for the pt. The pt uses a walker to ambulate. The pt does use O2 at home. The pt does have hh but was unsure of the agency. The pt is able to afford his  medications. Pt has a lung mass which he is still deciding if he will seek chemo tx. Pt has living will in chart.The pt has no other SW needs noted at this time. SW will continue to follow and offer support as needed.    Kwabena Yi LMSW

## 2018-03-20 NOTE — ASSESSMENT & PLAN NOTE
He has metastatic small cell lung cancer  Living will reviewed and discussed with patient, he is FULL CODE  Consult Dr Clay

## 2018-03-20 NOTE — PLAN OF CARE
Problem: Patient Care Overview  Goal: Plan of Care Review  Outcome: Ongoing (interventions implemented as appropriate)  Patient stable. /58. No reports of shortness of breath or pain. Persistent nonproductive cough. Receiving PRN respiratory treatments. Telemetry monitoring. Patient remains free from falls. Family remains at the bedside. Plan of care reviewed with patient. He voiced understanding.

## 2018-03-20 NOTE — PROGRESS NOTES
Upon entering pt. Room, found pt on floor with staff assisting pt. Pt. Was complaining of shortness of breath. Sao2 91% so increased Fio2 to NC 2 6 lpm until pt. Recovered. Pt. Placed back in bed.  PRN respiratory treatment given at this time. Pt. States he feels better.

## 2018-03-20 NOTE — PLAN OF CARE
03/19/18 1500   Medicare Message   Important Message from Medicare regarding Discharge Appeal Rights Given to patient/caregiver;Explained to patient/caregiver;Signed/date by patient/caregiver   Date IMM was signed 03/19/18   Time IMM was signed 1500

## 2018-03-20 NOTE — ASSESSMENT & PLAN NOTE
No TTE in our system. I consulted [facility] for records review to tell us what EF is  Holding home ARB, BB and aldactone due to hypotension and dehydration but watch fluid status CLOSELY while giving IVF overnight.No signs of volume overload on exam currently    None this am either. Before we overload we reduced the fluids. Bp still low this am 92/46. Watching closely

## 2018-03-20 NOTE — PLAN OF CARE
03/20/18 0959   NEWMAN Message   Medicare Outpatient and Observation Notification regarding financial responsibility Given to patient/caregiver;Explained to patient/caregiver;Signed/date by patient/caregiver   Date NEWMAN was signed 03/20/18   Time NEWMAN was signed 0959

## 2018-03-20 NOTE — SUBJECTIVE & OBJECTIVE
Review of Systems   Constitutional: Negative for activity change, fatigue, fever and unexpected weight change.   HENT: Negative for congestion, ear pain, hearing loss, rhinorrhea and sore throat.    Eyes: Negative for pain, redness and visual disturbance.   Respiratory: Positive for shortness of breath. Negative for cough and wheezing.    Cardiovascular: Positive for chest pain. Negative for palpitations and leg swelling.   Gastrointestinal: Negative for abdominal pain, constipation, diarrhea, nausea and vomiting.   Genitourinary: Negative for decreased urine volume, dysuria, frequency and urgency.   Musculoskeletal: Negative for back pain, joint swelling and neck pain.   Skin: Negative for color change, rash and wound.   Neurological: Positive for weakness. Negative for dizziness, light-headedness and headaches.     Objective:     Vital Signs (Most Recent):  Temp: 97 °F (36.1 °C) (03/20/18 0715)  Pulse: 76 (03/20/18 0839)  Resp: 18 (03/20/18 0715)  BP: (!) 92/46 (03/20/18 0715)  SpO2: (!) 94 % (03/20/18 0736) Vital Signs (24h Range):  Temp:  [96 °F (35.6 °C)-98.6 °F (37 °C)] 97 °F (36.1 °C)  Pulse:  [] 76  Resp:  [15-27] 18  SpO2:  [93 %-100 %] 94 %  BP: ()/(43-75) 92/46     Weight: 70.8 kg (156 lb)  Body mass index is 22.38 kg/m².    Physical Exam   Constitutional: He is oriented to person, place, and time. He appears well-developed and well-nourished. No distress.   HENT:   Head: Normocephalic and atraumatic.   Right Ear: External ear normal.   Left Ear: External ear normal.   Eyes: Conjunctivae and EOM are normal. Pupils are equal, round, and reactive to light. Right eye exhibits no discharge. Left eye exhibits no discharge.   Neck: Neck supple. No tracheal deviation present.   Cardiovascular: Normal rate and regular rhythm.    No murmur heard.  Pulmonary/Chest: Effort normal and breath sounds normal. No respiratory distress. He has no wheezes. He has no rales.   Abdominal: Soft. Bowel sounds are  normal. He exhibits no distension. There is no tenderness.   Musculoskeletal: He exhibits no edema.   Neurological: He is alert and oriented to person, place, and time. No cranial nerve deficit.   Skin: Skin is warm and dry.   tenting   Psychiatric: He has a normal mood and affect. His behavior is normal.   Nursing note and vitals reviewed.        CRANIAL NERVES     CN III, IV, VI   Pupils are equal, round, and reactive to light.  Extraocular motions are normal.        Significant Labs:   CBC:     Recent Labs  Lab 03/19/18  1248 03/20/18  0535   WBC 10.90 11.63   HGB 13.6* 12.9*   HCT 40.8 39.5*    286     CMP:     Recent Labs  Lab 03/19/18  1248 03/20/18  0535   * 136   K 5.1 5.1   CL 98 101   CO2 26 25   * 110   BUN 17 21   CREATININE 1.1 1.0   CALCIUM 9.3 9.1   PROT 6.6 6.0   ALBUMIN 2.7* 2.5*   BILITOT 1.0 0.7   ALKPHOS 81 75   AST 14 14   ALT 14 13   ANIONGAP 8 10   EGFRNONAA 58* >60     Cardiac Markers:     Recent Labs  Lab 03/19/18  1248   *   looks like his baseline 300's    Troponin:     Recent Labs  Lab 03/19/18  1247 03/20/18  0000 03/20/18  0535   TROPONINI 0.023 0.015 0.021     Lab Results   Component Value Date    DDIMER 1.83 (H) 03/19/2018   has dionicio ca, chronic SOB    Lab Results   Component Value Date    INR 1.1 03/19/2018    INR 1.1 01/10/2018    INR 1.1 10/21/2017       Urinalysis    Recent Labs  Lab 03/20/18  0920   COLORU Yellow   SPECGRAV 1.015   PHUR 6.0   PROTEINUA Negative   NITRITE Negative   LEUKOCYTESUR Negative   UROBILINOGEN Negative       All pertinent labs within the past 24 hours have been reviewed.    Significant Imaging:     CXR   COPD changes are noted.  No consolidation or pleural effusions.  The heart is enlarged.  Calcified atheromatous disease affects the aorta.  Left-sided pacemaker device is in place.     3/19 EKG Ventricular-paced rhythm  Abnormal ECG  When compared with ECG of 10-CLAUDETTE-2018 15:33,  No significant change was found    3/20 EKG  Ventricular-paced rhythm  Abnormal ECG  No previous ECGs available

## 2018-03-20 NOTE — H&P
"[facility] Medicine  History & Physical    Patient Name: See Leo  MRN: [id number]  Admission Date: 3/19/2018  Attending Physician: Patricia Florian MD   Primary Care Provider: Suhail Gonzalez MD         Patient information was obtained from patient and ER records.     Subjective:     Principal Problem:Hypotension    Chief Complaint:   Chief Complaint   Patient presents with    Shortness of Breath        HPI: Patient presented to ER today with change in mental status. He was at his wife's  and family reports he was "glazed over". No LOC but he was not responded to questions. Patient reports he was feeling SOB. He has chronic, intermittent chest pains and had some this morning. Denies palpitations. Reports very dark colored urine but denies dysuria, foul smelling urine. Family reports her mental status is back to baseline. On arrival was hypotensive and Cr 1.1. He did take all of his BP meds this AM. He reports he is feeling better this evening.     Past Medical History:   Diagnosis Date    CAD (coronary artery disease), native coronary artery     Cardiomyopathy     Carotid artery disease     COPD (chronic obstructive pulmonary disease)     Dyslipidemia     Encounter for blood transfusion     Esophageal reflux     GERD (gastroesophageal reflux disease)     GI hemorrhage 10/08/2015    Heart failure     HTN (hypertension), benign     Left ventricular systolic dysfunction     Paroxysmal A-fib     S/P CABG x 4        Past Surgical History:   Procedure Laterality Date    CORONARY ARTERY BYPASS GRAFT      x4    HIP SURGERY      LEG SURGERY      TONSILLECTOMY         Review of patient's allergies indicates:  No Known Allergies    No current facility-administered medications on file prior to encounter.      Current Outpatient Prescriptions on File Prior to Encounter   Medication Sig    albuterol-ipratropium 2.5mg-0.5mg/3mL (DUO-NEB) 0.5 mg-3 mg(2.5 mg base)/3 mL " nebulizer solution Take 3 mLs by nebulization every 6 (six) hours as needed for Wheezing. Rescue    atorvastatin (LIPITOR) 40 MG tablet Take 40 mg by mouth once daily.    b complex vitamins capsule Take 1 capsule by mouth once daily.    budesonide (PULMICORT) 0.5 mg/2 mL nebulizer solution TAKE 2 MLS (0.5 MG TOTAL) BY NEBULIZATION 2 (TWO) TIMES DAILY. CONTROLLER    docusate calcium (SURFAK) 240 mg capsule Take 240 mg by mouth every evening.     latanoprost 0.005 % ophthalmic solution PLACE 1 DROP INTO BOTH EYES EVERY EVENING.    memantine (NAMENDA) 10 MG Tab TAKE 1 TABLET (10 MG TOTAL) BY MOUTH 2 (TWO) TIMES DAILY.    metoprolol tartrate (LOPRESSOR) 25 MG tablet TAKE 1 TABLET BY MOUTH TWICE A DAY (Patient taking differently: Take 1/2 tablet orally twice daily)    NITROSTAT 0.4 mg SL tablet Place 0.4 mg under the tongue every 5 (five) minutes as needed.     RANEXA 1,000 mg Tb12 TAKE 1 TABLET ORALLY 2 TIMES A DAY.    spironolactone (ALDACTONE) 50 MG tablet Take 50 mg by mouth once daily.    tamsulosin (FLOMAX) 0.4 mg Cp24 TAKE 1 CAPSULE (0.4 MG TOTAL) BY MOUTH ONCE DAILY.    umeclidinium-vilanterol (ANORO ELLIPTA) 62.5-25 mcg/actuation DsDv Inhale 1 puff into the lungs once daily. Controller    valsartan (DIOVAN) 40 MG tablet Take 40 mg by mouth once daily. Take 1 tablet orally once a day if blood pressure top number foes above 100    VENTOLIN HFA 90 mcg/actuation inhaler INHALE 2 PUFFS INTO THE LUNGS EVERY 6 (SIX) HOURS AS NEEDED FOR WHEEZING.    [DISCONTINUED] furosemide (LASIX) 20 MG tablet TAKE 1 TABLET ORALLY ONCE A DAY AS NEEDED. FOR WEIGHT GAIN BY 2 POUNDS    [DISCONTINUED] predniSONE (DELTASONE) 20 MG tablet Take 2 tablets (40 mg total) by mouth once daily.    [DISCONTINUED] ranolazine (RANEXA) 500 MG Tb12 Take 1,000 mg by mouth 2 (two) times daily.     [DISCONTINUED] valsartan (DIOVAN) 40 MG tablet One po daily prn systolic BP above 100    [DISCONTINUED] ZETIA 10 mg tablet Take 10 mg by  mouth once daily.     Family History     None        Social History Main Topics    Smoking status: Former Smoker     Packs/day: 2.00     Years: 20.00     Quit date: 1/1/1970    Smokeless tobacco: Never Used    Alcohol use Yes      Comment: once in a while    Drug use: Unknown    Sexual activity: Not on file     Review of Systems   Constitutional: Negative for activity change, fatigue, fever and unexpected weight change.   HENT: Negative for congestion, ear pain, hearing loss, rhinorrhea and sore throat.    Eyes: Negative for pain, redness and visual disturbance.   Respiratory: Positive for shortness of breath. Negative for cough and wheezing.    Cardiovascular: Positive for chest pain. Negative for palpitations and leg swelling.   Gastrointestinal: Negative for abdominal pain, constipation, diarrhea, nausea and vomiting.   Genitourinary: Negative for decreased urine volume, dysuria, frequency and urgency.   Musculoskeletal: Negative for back pain, joint swelling and neck pain.   Skin: Negative for color change, rash and wound.   Neurological: Positive for weakness. Negative for dizziness, light-headedness and headaches.     Objective:     Vital Signs (Most Recent):  Temp: 96.2 °F (35.7 °C) (03/19/18 1909)  Pulse: 71 (03/19/18 1909)  Resp: 20 (03/19/18 1909)  BP: (!) 77/43 (03/19/18 1909)  SpO2: (!) 93 % (03/19/18 1909) Vital Signs (24h Range):  Temp:  [96 °F (35.6 °C)-96.2 °F (35.7 °C)] 96.2 °F (35.7 °C)  Pulse:  [68-93] 71  Resp:  [15-27] 20  SpO2:  [93 %-100 %] 93 %  BP: ()/(43-61) 77/43     Weight: 70.8 kg (156 lb)  Body mass index is 22.38 kg/m².    Physical Exam   Constitutional: He is oriented to person, place, and time. He appears well-developed and well-nourished. No distress.   HENT:   Head: Normocephalic and atraumatic.   Right Ear: External ear normal.   Left Ear: External ear normal.   Eyes: Conjunctivae and EOM are normal. Pupils are equal, round, and reactive to light. Right eye exhibits no  "discharge. Left eye exhibits no discharge.   Neck: Neck supple. No tracheal deviation present.   Cardiovascular: Normal rate and regular rhythm.    No murmur heard.  Pulmonary/Chest: Effort normal and breath sounds normal. No respiratory distress. He has no wheezes. He has no rales.   Abdominal: Soft. Bowel sounds are normal. He exhibits no distension. There is no tenderness.   Musculoskeletal: He exhibits no edema.   Neurological: He is alert and oriented to person, place, and time. No cranial nerve deficit.   Skin: Skin is warm and dry.   tenting   Psychiatric: He has a normal mood and affect. His behavior is normal.   Nursing note and vitals reviewed.        CRANIAL NERVES     CN III, IV, VI   Pupils are equal, round, and reactive to light.  Extraocular motions are normal.        Significant Labs:   CBC:   Recent Labs  Lab 03/19/18  1248   WBC 10.90   HGB 13.6*   HCT 40.8        CMP:   Recent Labs  Lab 03/19/18  1248   *   K 5.1   CL 98   CO2 26   *   BUN 17   CREATININE 1.1   CALCIUM 9.3   PROT 6.6   ALBUMIN 2.7*   BILITOT 1.0   ALKPHOS 81   AST 14   ALT 14   ANIONGAP 8   EGFRNONAA 58*     Cardiac Markers:   Recent Labs  Lab 03/19/18  1248   *     Troponin:   Recent Labs  Lab 03/19/18  1247   TROPONINI 0.023     All pertinent labs within the past 24 hours have been reviewed.    Significant Imaging: I have reviewed all pertinent imaging results/findings within the past 24 hours.    Assessment/Plan:     * Hypotension    Likely due to mild dehydration  Starting IVF  Family reports "heart functioning at 25%"---unsure what his EF is. Will ask [facility] to give us records in the AM. Sees Dr. Anderson    If EF is 25% will need to monitor fluid status closely but he has dry mucous membranes and tenting on exam so I think fluids are appropriate    Hold all christin eBP meds          Dehydration    IVF 125cc/hr          Malignant neoplasm of lower lobe of left lung    He has metastatic small cell lung " cancer  Living will reviewed and discussed with patient, he is FULL CODE          Transient alteration of awareness    Now resolved  Likely some dehydration, on IVF          Systolic heart failure secondary to coronary artery disease    No TTE in our system. I consulted [facility] for records review to tell us what EF is  Holding home ARB, BB and aldactone due to hypotension and dehydration but watch fluid status CLOSELY while giving IVF overnight.No signs of volume overload on exam currently          Dementia    Cont home namenda  Per family, he is at baseline mental status this evening          Chronic atrial fibrillation    Noted in chart--hold BB due to hypotension  telemetry            VTE Risk Mitigation         Ordered     IP VTE HIGH RISK PATIENT  Once      03/19/18 1902     Place sequential compression device  Until discontinued      03/19/18 1902             Patricia Florian MD  Department of Hospital Medicine   [facility]

## 2018-03-20 NOTE — ASSESSMENT & PLAN NOTE
He has metastatic small cell lung cancer  Living will reviewed and discussed with patient, he is FULL CODE

## 2018-03-21 LAB
ALBUMIN SERPL BCP-MCNC: 2.2 G/DL
ALP SERPL-CCNC: 68 U/L
ALT SERPL W/O P-5'-P-CCNC: 13 U/L
ANION GAP SERPL CALC-SCNC: 9 MMOL/L
AST SERPL-CCNC: 14 U/L
BASOPHILS # BLD AUTO: 0.01 K/UL
BASOPHILS NFR BLD: 0.1 %
BILIRUB SERPL-MCNC: 0.7 MG/DL
BNP SERPL-MCNC: 484 PG/ML
BUN SERPL-MCNC: 17 MG/DL
CALCIUM SERPL-MCNC: 8.5 MG/DL
CHLORIDE SERPL-SCNC: 101 MMOL/L
CO2 SERPL-SCNC: 22 MMOL/L
CREAT SERPL-MCNC: 0.8 MG/DL
DIFFERENTIAL METHOD: ABNORMAL
EOSINOPHIL # BLD AUTO: 0 K/UL
EOSINOPHIL NFR BLD: 0.3 %
ERYTHROCYTE [DISTWIDTH] IN BLOOD BY AUTOMATED COUNT: 16.2 %
EST. GFR  (AFRICAN AMERICAN): >60 ML/MIN/1.73 M^2
EST. GFR  (NON AFRICAN AMERICAN): >60 ML/MIN/1.73 M^2
GLUCOSE SERPL-MCNC: 106 MG/DL
HCT VFR BLD AUTO: 37.8 %
HGB BLD-MCNC: 12.5 G/DL
LYMPHOCYTES # BLD AUTO: 0.5 K/UL
LYMPHOCYTES NFR BLD: 4.6 %
MCH RBC QN AUTO: 28.7 PG
MCHC RBC AUTO-ENTMCNC: 33.1 G/DL
MCV RBC AUTO: 87 FL
MONOCYTES # BLD AUTO: 0.7 K/UL
MONOCYTES NFR BLD: 6.1 %
NEUTROPHILS # BLD AUTO: 10.4 K/UL
NEUTROPHILS NFR BLD: 88.9 %
PLATELET # BLD AUTO: 273 K/UL
PMV BLD AUTO: 9.5 FL
POTASSIUM SERPL-SCNC: 4.3 MMOL/L
PROT SERPL-MCNC: 5.5 G/DL
RBC # BLD AUTO: 4.35 M/UL
SODIUM SERPL-SCNC: 132 MMOL/L
TROPONIN I SERPL DL<=0.01 NG/ML-MCNC: 0.03 NG/ML
TROPONIN I SERPL DL<=0.01 NG/ML-MCNC: 0.03 NG/ML
WBC # BLD AUTO: 11.7 K/UL

## 2018-03-21 PROCEDURE — 84484 ASSAY OF TROPONIN QUANT: CPT | Mod: 91

## 2018-03-21 PROCEDURE — 94640 AIRWAY INHALATION TREATMENT: CPT

## 2018-03-21 PROCEDURE — 25000242 PHARM REV CODE 250 ALT 637 W/ HCPCS: Performed by: INTERNAL MEDICINE

## 2018-03-21 PROCEDURE — 25000003 PHARM REV CODE 250: Performed by: INTERNAL MEDICINE

## 2018-03-21 PROCEDURE — 27000221 HC OXYGEN, UP TO 24 HOURS

## 2018-03-21 PROCEDURE — 80053 COMPREHEN METABOLIC PANEL: CPT

## 2018-03-21 PROCEDURE — G0378 HOSPITAL OBSERVATION PER HR: HCPCS

## 2018-03-21 PROCEDURE — 36415 COLL VENOUS BLD VENIPUNCTURE: CPT

## 2018-03-21 PROCEDURE — 94761 N-INVAS EAR/PLS OXIMETRY MLT: CPT

## 2018-03-21 PROCEDURE — 25000003 PHARM REV CODE 250: Performed by: NURSE PRACTITIONER

## 2018-03-21 PROCEDURE — 84484 ASSAY OF TROPONIN QUANT: CPT

## 2018-03-21 PROCEDURE — 85025 COMPLETE CBC W/AUTO DIFF WBC: CPT

## 2018-03-21 PROCEDURE — 63600175 PHARM REV CODE 636 W HCPCS: Performed by: NURSE PRACTITIONER

## 2018-03-21 PROCEDURE — 25000003 PHARM REV CODE 250: Performed by: FAMILY MEDICINE

## 2018-03-21 PROCEDURE — 25000003 PHARM REV CODE 250: Performed by: SURGERY

## 2018-03-21 PROCEDURE — 83880 ASSAY OF NATRIURETIC PEPTIDE: CPT

## 2018-03-21 PROCEDURE — 99225 PR SUBSEQUENT OBSERVATION CARE,LEVEL II: CPT | Mod: ,,, | Performed by: FAMILY MEDICINE

## 2018-03-21 RX ORDER — PROMETHAZINE HYDROCHLORIDE AND CODEINE PHOSPHATE 6.25; 1 MG/5ML; MG/5ML
5 SOLUTION ORAL EVERY 4 HOURS PRN
Status: DISCONTINUED | OUTPATIENT
Start: 2018-03-21 | End: 2018-03-22 | Stop reason: HOSPADM

## 2018-03-21 RX ADMIN — MEMANTINE 10 MG: 10 TABLET ORAL at 08:03

## 2018-03-21 RX ADMIN — PROMETHAZINE HYDROCHLORIDE AND CODEINE PHOSPHATE 5 ML: 6.25; 1 SOLUTION ORAL at 08:03

## 2018-03-21 RX ADMIN — TAMSULOSIN HYDROCHLORIDE 0.4 MG: 0.4 CAPSULE ORAL at 08:03

## 2018-03-21 RX ADMIN — IPRATROPIUM BROMIDE AND ALBUTEROL SULFATE 3 ML: 2.5; .5 SOLUTION RESPIRATORY (INHALATION) at 04:03

## 2018-03-21 RX ADMIN — ENOXAPARIN SODIUM 40 MG: 100 INJECTION SUBCUTANEOUS at 05:03

## 2018-03-21 RX ADMIN — SODIUM CHLORIDE: 0.9 INJECTION, SOLUTION INTRAVENOUS at 04:03

## 2018-03-21 RX ADMIN — PANTOPRAZOLE SODIUM 40 MG: 40 TABLET, DELAYED RELEASE ORAL at 08:03

## 2018-03-21 RX ADMIN — ASPIRIN 325 MG: 325 TABLET, COATED ORAL at 08:03

## 2018-03-21 NOTE — PROGRESS NOTES
[facility]  Pulmonology  Progress Note    Patient Name: See Leo  MRN: [id number]  Admission Date: 3/19/2018  Hospital Length of Stay: 0 days  Code Status: Full Code  Attending Provider: Patricia Florian MD  Primary Care Provider: Suhail Gonzalez MD   Principal Problem: Hypotension    Subjective:     Interval History: wants hospice    Objective:     Vital Signs (Most Recent):  Temp: 96.2 °F (35.7 °C) (03/21/18 0702)  Pulse: 73 (03/21/18 1000)  Resp: (!) 22 (03/21/18 0702)  BP: (!) 108/48 (03/21/18 0702)  SpO2: (!) 94 % (03/21/18 0705) Vital Signs (24h Range):  Temp:  [96.2 °F (35.7 °C)-98.2 °F (36.8 °C)] 96.2 °F (35.7 °C)  Pulse:  [42-99] 73  Resp:  [18-24] 22  SpO2:  [91 %-95 %] 94 %  BP: ()/(48-67) 108/48     Weight: 70.8 kg (156 lb)  Body mass index is 22.38 kg/m².      Intake/Output Summary (Last 24 hours) at 03/21/18 1013  Last data filed at 03/21/18 0900   Gross per 24 hour   Intake          2747.08 ml   Output              980 ml   Net          1767.08 ml       Physical Exam   Constitutional: He is oriented to person, place, and time. He appears well-developed and well-nourished. He is cooperative.  Non-toxic appearance. He does not appear ill. No distress.   HENT:   Head: Normocephalic and atraumatic.   Right Ear: Hearing, tympanic membrane, external ear and ear canal normal.   Left Ear: Hearing, tympanic membrane, external ear and ear canal normal.   Nose: Nose normal. No mucosal edema, rhinorrhea or nasal deformity. No epistaxis. Right sinus exhibits no maxillary sinus tenderness and no frontal sinus tenderness. Left sinus exhibits no maxillary sinus tenderness and no frontal sinus tenderness.   Mouth/Throat: Uvula is midline, oropharynx is clear and moist and mucous membranes are normal. No trismus in the jaw. Normal dentition. No uvula swelling. No posterior oropharyngeal erythema.   Eyes: Conjunctivae and lids are normal. No scleral icterus.   Sclera clear bilat   Neck:  Trachea normal, full passive range of motion without pain and phonation normal. Neck supple.   Cardiovascular: Normal rate, regular rhythm, normal heart sounds, intact distal pulses and normal pulses.    Pulmonary/Chest: He is in respiratory distress (mild to moderate). He has decreased breath sounds in the right upper field, the right middle field, the right lower field, the left upper field, the left middle field and the left lower field. He has wheezes in the right lower field and the left lower field. He has rhonchi in the right middle field, the right lower field, the left middle field and the left lower field.           Abdominal: Soft. Normal appearance and bowel sounds are normal. He exhibits no distension. There is no tenderness.   Musculoskeletal: Normal range of motion. He exhibits no edema or deformity.   Neurological: He is alert and oriented to person, place, and time. He exhibits normal muscle tone. Coordination normal.   Skin: Skin is warm, dry and intact. He is not diaphoretic. No pallor.   Psychiatric: He has a normal mood and affect. His speech is normal and behavior is normal. Judgment and thought content normal. Cognition and memory are normal.   Nursing note and vitals reviewed.      Vents:  Oxygen Concentration (%): 50 (03/20/18 2337)    Lines/Drains/Airways     Peripheral Intravenous Line                 Peripheral IV - Single Lumen 03/19/18 1243 Right Antecubital 1 day         Peripheral IV - Single Lumen 03/20/18 Right;Anterior Forearm 1 day                Significant Labs:    CBC/Anemia Profile:    Recent Labs  Lab 03/19/18  1248 03/20/18  0535 03/21/18  0557   WBC 10.90 11.63 11.70   HGB 13.6* 12.9* 12.5*   HCT 40.8 39.5* 37.8*    286 273   MCV 88 88 87   RDW 16.2* 16.3* 16.2*        Chemistries:    Recent Labs  Lab 03/19/18  1248 03/20/18  0535 03/21/18  0557   * 136 132*   K 5.1 5.1 4.3   CL 98 101 101   CO2 26 25 22*   BUN 17 21 17   CREATININE 1.1 1.0 0.8   CALCIUM 9.3  9.1 8.5*   ALBUMIN 2.7* 2.5* 2.2*   PROT 6.6 6.0 5.5*   BILITOT 1.0 0.7 0.7   ALKPHOS 81 75 68   ALT 14 13 13   AST 14 14 14       All pertinent labs within the past 24 hours have been reviewed.    Significant Imaging:  I have reviewed all pertinent imaging results/findings within the past 24 hours.    Assessment/Plan:     Malignant neoplasm of lower lobe of left lung    Wants hospice  Significant upper airway lung cancer R and L seeing dr lindsay in [place]  Wife  recently after hearing her  had lung cancer They wer  70+ years        Acute chest pain    Stable n        Systolic heart failure secondary to coronary artery disease    Stablen         Dementia    mild        Chronic atrial fibrillation    With rapid vent rate               Frantz Clay MD  Pulmonology  [facility]

## 2018-03-21 NOTE — SUBJECTIVE & OBJECTIVE
Interval History: wants hospice    Objective:     Vital Signs (Most Recent):  Temp: 96.2 °F (35.7 °C) (03/21/18 0702)  Pulse: 73 (03/21/18 1000)  Resp: (!) 22 (03/21/18 0702)  BP: (!) 108/48 (03/21/18 0702)  SpO2: (!) 94 % (03/21/18 0705) Vital Signs (24h Range):  Temp:  [96.2 °F (35.7 °C)-98.2 °F (36.8 °C)] 96.2 °F (35.7 °C)  Pulse:  [42-99] 73  Resp:  [18-24] 22  SpO2:  [91 %-95 %] 94 %  BP: ()/(48-67) 108/48     Weight: 70.8 kg (156 lb)  Body mass index is 22.38 kg/m².      Intake/Output Summary (Last 24 hours) at 03/21/18 1013  Last data filed at 03/21/18 0900   Gross per 24 hour   Intake          2747.08 ml   Output              980 ml   Net          1767.08 ml       Physical Exam   Constitutional: He is oriented to person, place, and time. He appears well-developed and well-nourished. He is cooperative.  Non-toxic appearance. He does not appear ill. No distress.   HENT:   Head: Normocephalic and atraumatic.   Right Ear: Hearing, tympanic membrane, external ear and ear canal normal.   Left Ear: Hearing, tympanic membrane, external ear and ear canal normal.   Nose: Nose normal. No mucosal edema, rhinorrhea or nasal deformity. No epistaxis. Right sinus exhibits no maxillary sinus tenderness and no frontal sinus tenderness. Left sinus exhibits no maxillary sinus tenderness and no frontal sinus tenderness.   Mouth/Throat: Uvula is midline, oropharynx is clear and moist and mucous membranes are normal. No trismus in the jaw. Normal dentition. No uvula swelling. No posterior oropharyngeal erythema.   Eyes: Conjunctivae and lids are normal. No scleral icterus.   Sclera clear bilat   Neck: Trachea normal, full passive range of motion without pain and phonation normal. Neck supple.   Cardiovascular: Normal rate, regular rhythm, normal heart sounds, intact distal pulses and normal pulses.    Pulmonary/Chest: He is in respiratory distress (mild to moderate). He has decreased breath sounds in the right upper field,  the right middle field, the right lower field, the left upper field, the left middle field and the left lower field. He has wheezes in the right lower field and the left lower field. He has rhonchi in the right middle field, the right lower field, the left middle field and the left lower field.           Abdominal: Soft. Normal appearance and bowel sounds are normal. He exhibits no distension. There is no tenderness.   Musculoskeletal: Normal range of motion. He exhibits no edema or deformity.   Neurological: He is alert and oriented to person, place, and time. He exhibits normal muscle tone. Coordination normal.   Skin: Skin is warm, dry and intact. He is not diaphoretic. No pallor.   Psychiatric: He has a normal mood and affect. His speech is normal and behavior is normal. Judgment and thought content normal. Cognition and memory are normal.   Nursing note and vitals reviewed.      Vents:  Oxygen Concentration (%): 50 (03/20/18 2337)    Lines/Drains/Airways     Peripheral Intravenous Line                 Peripheral IV - Single Lumen 03/19/18 1243 Right Antecubital 1 day         Peripheral IV - Single Lumen 03/20/18 Right;Anterior Forearm 1 day                Significant Labs:    CBC/Anemia Profile:    Recent Labs  Lab 03/19/18  1248 03/20/18  0535 03/21/18  0557   WBC 10.90 11.63 11.70   HGB 13.6* 12.9* 12.5*   HCT 40.8 39.5* 37.8*    286 273   MCV 88 88 87   RDW 16.2* 16.3* 16.2*        Chemistries:    Recent Labs  Lab 03/19/18  1248 03/20/18  0535 03/21/18  0557   * 136 132*   K 5.1 5.1 4.3   CL 98 101 101   CO2 26 25 22*   BUN 17 21 17   CREATININE 1.1 1.0 0.8   CALCIUM 9.3 9.1 8.5*   ALBUMIN 2.7* 2.5* 2.2*   PROT 6.6 6.0 5.5*   BILITOT 1.0 0.7 0.7   ALKPHOS 81 75 68   ALT 14 13 13   AST 14 14 14       All pertinent labs within the past 24 hours have been reviewed.    Significant Imaging:  I have reviewed all pertinent imaging results/findings within the past 24 hours.

## 2018-03-21 NOTE — PLAN OF CARE
18 0840   Discharge Reassessment   Assessment Type Discharge Planning Reassessment     Sw spoke with the pt about hospice care and the pt stated that he is ready to go and be with his wife. The pt's wife  last week and he was unable to attend the . The pt advised the Sw to contact his son about choosing a hospice agency. The Sw contacted the pt's son (Ash) [phone] and daughter (Monisha) [phone] and left a message for call back.

## 2018-03-21 NOTE — PLAN OF CARE
03/21/18 1102   Discharge Assessment   Assessment Type Discharge Planning Reassessment     Discussed discharge with patient and family. They are requesting hospice consult this afternoon.

## 2018-03-21 NOTE — ASSESSMENT & PLAN NOTE
And elevated d diner. Check CTA chest- negative but increasing size of lung ca  Lab Results   Component Value Date    DDIMER 1.83 (H) 03/19/2018     Has hx of GI bleed after anticoagulation from A fib

## 2018-03-21 NOTE — ASSESSMENT & PLAN NOTE
Wants hospice  Significant upper airway lung cancer R and L seeing dr lindsay in [place]  Wife  recently after hearing her  had lung cancer They wer  70+ years

## 2018-03-21 NOTE — SUBJECTIVE & OBJECTIVE
Review of Systems   Constitutional: Negative for activity change, fatigue, fever and unexpected weight change.   HENT: Negative for congestion, ear pain, hearing loss, rhinorrhea and sore throat.    Eyes: Negative for pain, redness and visual disturbance.   Respiratory: Positive for shortness of breath. Negative for cough and wheezing.    Cardiovascular: Positive for chest pain. Negative for palpitations and leg swelling.   Gastrointestinal: Negative for abdominal pain, constipation, diarrhea, nausea and vomiting.   Genitourinary: Negative for decreased urine volume, dysuria, frequency and urgency.   Musculoskeletal: Negative for back pain, joint swelling and neck pain.   Skin: Negative for color change, rash and wound.   Neurological: Positive for weakness. Negative for dizziness, light-headedness and headaches.     Objective:     Vital Signs (Most Recent):  Temp: 96.2 °F (35.7 °C) (03/21/18 0702)  Pulse: 74 (03/21/18 0702)  Resp: (!) 22 (03/21/18 0702)  BP: (!) 108/48 (03/21/18 0702)  SpO2: (!) 94 % (03/21/18 0702) Vital Signs (24h Range):  Temp:  [96.2 °F (35.7 °C)-98.2 °F (36.8 °C)] 96.2 °F (35.7 °C)  Pulse:  [42-99] 74  Resp:  [18-24] 22  SpO2:  [91 %-95 %] 94 %  BP: ()/(48-67) 108/48     Weight: 70.8 kg (156 lb)  Body mass index is 22.38 kg/m².    Physical Exam   Constitutional: He is oriented to person, place, and time. He appears well-developed and well-nourished. No distress.   HENT:   Head: Normocephalic and atraumatic.   Right Ear: External ear normal.   Left Ear: External ear normal.   Eyes: Conjunctivae and EOM are normal. Pupils are equal, round, and reactive to light. Right eye exhibits no discharge. Left eye exhibits no discharge.   Neck: Neck supple. No tracheal deviation present.   Cardiovascular: Normal rate and regular rhythm.    No murmur heard.  Pulmonary/Chest: Effort normal and breath sounds normal. No respiratory distress. He has no wheezes. He has no rales.   Abdominal: Soft. Bowel  sounds are normal. He exhibits no distension. There is no tenderness.   Musculoskeletal: He exhibits no edema.   Neurological: He is alert and oriented to person, place, and time. No cranial nerve deficit.   Skin: Skin is warm and dry.   tenting   Psychiatric: He has a normal mood and affect. His behavior is normal.   Nursing note and vitals reviewed.        CRANIAL NERVES     CN III, IV, VI   Pupils are equal, round, and reactive to light.  Extraocular motions are normal.        Significant Labs:   CBC:     Recent Labs  Lab 03/19/18  1248 03/20/18  0535 03/21/18  0557   WBC 10.90 11.63 11.70   HGB 13.6* 12.9* 12.5*   HCT 40.8 39.5* 37.8*    286 273     CMP:     Recent Labs  Lab 03/19/18  1248 03/20/18  0535   * 136   K 5.1 5.1   CL 98 101   CO2 26 25   * 110   BUN 17 21   CREATININE 1.1 1.0   CALCIUM 9.3 9.1   PROT 6.6 6.0   ALBUMIN 2.7* 2.5*   BILITOT 1.0 0.7   ALKPHOS 81 75   AST 14 14   ALT 14 13   ANIONGAP 8 10   EGFRNONAA 58* >60     Cardiac Markers:     Recent Labs  Lab 03/19/18  1248   *   looks like his baseline 300's    Troponin:     Recent Labs  Lab 03/20/18  1242 03/20/18  1805 03/21/18  0039   TROPONINI 0.027* 0.026 0.028*     Lab Results   Component Value Date    DDIMER 1.83 (H) 03/19/2018   has lung ca, chronic SOB, CTA negative PE    Lab Results   Component Value Date    INR 1.1 03/19/2018    INR 1.1 01/10/2018    INR 1.1 10/21/2017       Urinalysis    Recent Labs  Lab 03/20/18  0920   COLORU Yellow   SPECGRAV 1.015   PHUR 6.0   PROTEINUA Negative   NITRITE Negative   LEUKOCYTESUR Negative   UROBILINOGEN Negative       All pertinent labs within the past 24 hours have been reviewed.    Significant Imaging:     CXR   COPD changes are noted.  No consolidation or pleural effusions.  The heart is enlarged.  Calcified atheromatous disease affects the aorta.  Left-sided pacemaker device is in place.     3/19 EKG Ventricular-paced rhythm  Abnormal ECG  When compared with ECG of  10-CLAUDETTE-2018 15:33,  No significant change was found    3/20 EKG Ventricular-paced rhythm  Abnormal ECG  No previous ECGs available    3/20 CTA  No evidence for pulmonary embolus.    Interval detrimental change with increased size of the left upper lobe pulmonary mass which now completely invades the left pulmonary roman with near complete occlusion of the left mainstem bronchus as well the left upper and lower lobe bronchi with encasement of the left main pulmonary artery and its left upper and lower lobe branches as well as the left lower lobe pulmonary vein.  There is post obstructive atelectasis/pneumonia involving the left lower lobe as well small bilateral pleural effusions.  Additionally, there has been interval increase in size of the groundglass spiculated density in the left upper lobe with interval development of a new solid nodule left upper lobe just worsening disease.    Cardiomegaly with reflux of contrast material into the IVC and hepatic veins suggesting elevated right heart pressures.

## 2018-03-21 NOTE — PLAN OF CARE
Problem: Patient Care Overview  Goal: Plan of Care Review  Outcome: Ongoing (interventions implemented as appropriate)  Required repetitive reinforcment to call for assist to urinate.  Rounding frequently to assist and meet needs.

## 2018-03-21 NOTE — PLAN OF CARE
Problem: Patient Care Overview  Goal: Plan of Care Review  Outcome: Ongoing (interventions implemented as appropriate)  Pt admitted with hypotension and increased SOB. Blood pressures stable today; IV fluids discontinued. Pt regular BP meds still on hold. Some SOB and IBRAHIM remain. Pt on 4L via NC with sats 91-94%. Pt spoke with PCP and decided on hospice care for advanced lung CA; family involved in care and supportive of decision. Pt met with [facility] and will be admitted tomorrow. Call bell within reach. Bed in low, locked position. Reviewed plan of care with pt and family; state agreement.

## 2018-03-21 NOTE — PLAN OF CARE
Problem: Fall Risk (Adult)  Intervention: Safety Promotion/Fall Prevention  Reinforced use of call bell to help assist to urinate at bedside.  Head of bed elevated to assist breathing.  Side rails up x2.

## 2018-03-21 NOTE — NURSING
Complaining of shortness of breath.  Agitated.  Head of bed elevated.  O2 per mask initiated.  Respiratory called. Changed to ventimask at 50%.  Effective 95% Sat.  Calm and indicates feeling better.  Avysis in place.

## 2018-03-21 NOTE — ASSESSMENT & PLAN NOTE
"Likely due to mild dehydration  Starting IVF- reducied yesterday as creat improved (1.1>1.0 but na better 132>>136)  Family reports "heart functioning at 25%"---unsure what his EF is. Will ask [facility] to give us records in the AM. Sees Dr. Anderson    Hold all home BP meds for today again as bp still running low 92/46, ivf at 75ml/hr  Today 3/21 bp better 108/48    "

## 2018-03-21 NOTE — PROGRESS NOTES
"[facility] Hospital Medicine  Progress Note    Patient Name: See Leo  MRN: [id number]  Patient Class: OP- Observation   Admission Date: 3/19/2018  Length of Stay: 0 days  Attending Physician: Patricia Florian MD  Primary Care Provider: Suhail Gonzalez MD        Subjective:     Principal Problem:Hypotension    HPI:  Patient presented to ER today with change in mental status. He was at his wife's  and family reports he was "glazed over". No LOC but he was not responded to questions. Patient reports he was feeling SOB. He has chronic, intermittent chest pains and had some this morning. Denies palpitations. Reports very dark colored urine but denies dysuria, foul smelling urine. Family reports her mental status is back to baseline. On arrival was hypotensive and Cr 1.1. He did take all of his BP meds this AM. He reports he is feeling better this evening.     Hospital Course:  Pt placed in obs. He reports that he is not doing to well this am. He has been having a lot of accidents with all the fluids. He reports that he was not able to make his wife's  yesterday because he was too weak and fatigued. Heis Na is better 132>>136. He is on NS at 125ml/hr. Pressure better. BNP looks like stable for him 300s.     He still reports that he is SOB this am, but he reports that this is chronic for him. He is using nebs. He also reports that he has lung ca. Noted on CT in . He reports that he sees Dr Clay. D dimer was elevated but no increased SOB/no hypoxia. POX 94-96%. ( hx of parox afib not anticoagulated due to GI bleed)    3/21  Remains on IVF but only at 75ml/hr. //67. Afebrile. Requiring 4L O2 TKS >90%. Cta done yesterday to r/o PE, no PE due to elevated d dimer, but shows ca that has progressed. Dr Clay consulted. Pt seeing Dr Escobar in [facility] for oncology.       Review of Systems   Constitutional: Negative for activity change, fatigue, fever and unexpected weight change. "   HENT: Negative for congestion, ear pain, hearing loss, rhinorrhea and sore throat.    Eyes: Negative for pain, redness and visual disturbance.   Respiratory: Positive for shortness of breath. Negative for cough and wheezing.    Cardiovascular: Positive for chest pain. Negative for palpitations and leg swelling.   Gastrointestinal: Negative for abdominal pain, constipation, diarrhea, nausea and vomiting.   Genitourinary: Negative for decreased urine volume, dysuria, frequency and urgency.   Musculoskeletal: Negative for back pain, joint swelling and neck pain.   Skin: Negative for color change, rash and wound.   Neurological: Positive for weakness. Negative for dizziness, light-headedness and headaches.     Objective:     Vital Signs (Most Recent):  Temp: 96.2 °F (35.7 °C) (03/21/18 0702)  Pulse: 74 (03/21/18 0702)  Resp: (!) 22 (03/21/18 0702)  BP: (!) 108/48 (03/21/18 0702)  SpO2: (!) 94 % (03/21/18 0702) Vital Signs (24h Range):  Temp:  [96.2 °F (35.7 °C)-98.2 °F (36.8 °C)] 96.2 °F (35.7 °C)  Pulse:  [42-99] 74  Resp:  [18-24] 22  SpO2:  [91 %-95 %] 94 %  BP: ()/(48-67) 108/48     Weight: 70.8 kg (156 lb)  Body mass index is 22.38 kg/m².    Physical Exam   Constitutional: He is oriented to person, place, and time. He appears well-developed and well-nourished. No distress.   HENT:   Head: Normocephalic and atraumatic.   Right Ear: External ear normal.   Left Ear: External ear normal.   Eyes: Conjunctivae and EOM are normal. Pupils are equal, round, and reactive to light. Right eye exhibits no discharge. Left eye exhibits no discharge.   Neck: Neck supple. No tracheal deviation present.   Cardiovascular: Normal rate and regular rhythm.    No murmur heard.  Pulmonary/Chest: Effort normal and breath sounds normal. No respiratory distress. He has no wheezes. He has no rales.   Abdominal: Soft. Bowel sounds are normal. He exhibits no distension. There is no tenderness.   Musculoskeletal: He exhibits no edema.    Neurological: He is alert and oriented to person, place, and time. No cranial nerve deficit.   Skin: Skin is warm and dry.   tenting   Psychiatric: He has a normal mood and affect. His behavior is normal.   Nursing note and vitals reviewed.        CRANIAL NERVES     CN III, IV, VI   Pupils are equal, round, and reactive to light.  Extraocular motions are normal.        Significant Labs:   CBC:     Recent Labs  Lab 03/19/18  1248 03/20/18  0535 03/21/18  0557   WBC 10.90 11.63 11.70   HGB 13.6* 12.9* 12.5*   HCT 40.8 39.5* 37.8*    286 273     CMP:     Recent Labs  Lab 03/19/18  1248 03/20/18  0535   * 136   K 5.1 5.1   CL 98 101   CO2 26 25   * 110   BUN 17 21   CREATININE 1.1 1.0   CALCIUM 9.3 9.1   PROT 6.6 6.0   ALBUMIN 2.7* 2.5*   BILITOT 1.0 0.7   ALKPHOS 81 75   AST 14 14   ALT 14 13   ANIONGAP 8 10   EGFRNONAA 58* >60     Cardiac Markers:     Recent Labs  Lab 03/19/18  1248   *   looks like his baseline 300's    Troponin:     Recent Labs  Lab 03/20/18  1242 03/20/18  1805 03/21/18  0039   TROPONINI 0.027* 0.026 0.028*     Lab Results   Component Value Date    DDIMER 1.83 (H) 03/19/2018   has lung ca, chronic SOB, CTA negative PE    Lab Results   Component Value Date    INR 1.1 03/19/2018    INR 1.1 01/10/2018    INR 1.1 10/21/2017       Urinalysis    Recent Labs  Lab 03/20/18  0920   COLORU Yellow   SPECGRAV 1.015   PHUR 6.0   PROTEINUA Negative   NITRITE Negative   LEUKOCYTESUR Negative   UROBILINOGEN Negative       All pertinent labs within the past 24 hours have been reviewed.    Significant Imaging:     CXR   COPD changes are noted.  No consolidation or pleural effusions.  The heart is enlarged.  Calcified atheromatous disease affects the aorta.  Left-sided pacemaker device is in place.     3/19 EKG Ventricular-paced rhythm  Abnormal ECG  When compared with ECG of 10-CLAUDETTE-2018 15:33,  No significant change was found    3/20 EKG Ventricular-paced rhythm  Abnormal ECG  No  "previous ECGs available    3/20 CTA  No evidence for pulmonary embolus.    Interval detrimental change with increased size of the left upper lobe pulmonary mass which now completely invades the left pulmonary roman with near complete occlusion of the left mainstem bronchus as well the left upper and lower lobe bronchi with encasement of the left main pulmonary artery and its left upper and lower lobe branches as well as the left lower lobe pulmonary vein.  There is post obstructive atelectasis/pneumonia involving the left lower lobe as well small bilateral pleural effusions.  Additionally, there has been interval increase in size of the groundglass spiculated density in the left upper lobe with interval development of a new solid nodule left upper lobe just worsening disease.    Cardiomegaly with reflux of contrast material into the IVC and hepatic veins suggesting elevated right heart pressures.      Assessment/Plan:      * Hypotension    Likely due to mild dehydration  Starting IVF- reducied yesterday as creat improved (1.1>1.0 but na better 132>>136)  Family reports "heart functioning at 25%"---unsure what his EF is. Will ask [facility] to give us records in the AM. Sees Dr. Anderson    Hold all home BP meds for today again as bp still running low 92/46, ivf at 75ml/hr  Today 3/21 bp better 108/48          SOB (shortness of breath)    And elevated d diner. Check CTA chest- negative but increasing size of lung ca  Lab Results   Component Value Date    DDIMER 1.83 (H) 03/19/2018     Has hx of GI bleed after anticoagulation from A fib          Dehydration    IVF 125cc/hr>> reduced to 75ml/hr, D/c        Malignant neoplasm of lower lobe of left lung    He has metastatic small cell lung cancer  Living will reviewed and discussed with patient, he is FULL CODE  Consulted Dr Clay  Had long conversation about his progressing lung ca and treatments. He is ready for hospice, will consult. He verbalized understanding              Acute " chest pain    Likely due to lung ca but no c/o pain today          Transient alteration of awareness    Now resolved  Likely some dehydration, on IVF          Systolic heart failure secondary to coronary artery disease    No TTE in our system. I consulted [facility] for records review to tell us what EF is  Holding home ARB, BB and aldactone due to hypotension and dehydration but watch fluid status CLOSELY while giving IVF overnight.No signs of volume overload on exam currently    None this am either. Before we overload we reduced the fluids. Bp still low but normal 108/48. Watching closely BNP pending today            Dementia    Cont home namenda  Per family, he is at baseline mental status           Chronic atrial fibrillation    Noted in chart--hold BB due to hypotension HR 69-*76  Telemetry- he is paced on EKG/tele            VTE Risk Mitigation         Ordered     enoxaparin injection 40 mg  Daily     Route:  Subcutaneous        03/20/18 0734     IP VTE HIGH RISK PATIENT  Once      03/19/18 1902     Place sequential compression device  Until discontinued      03/19/18 1902              Suhail Gonzalez MD  Department of Hospital Medicine   [facility]

## 2018-03-21 NOTE — ASSESSMENT & PLAN NOTE
No TTE in our system. I consulted [facility] for records review to tell us what EF is  Holding home ARB, BB and aldactone due to hypotension and dehydration but watch fluid status CLOSELY while giving IVF overnight.No signs of volume overload on exam currently    None this am either. Before we overload we reduced the fluids. Bp still low but normal 108/48. Watching closely BNP pending today

## 2018-03-21 NOTE — ASSESSMENT & PLAN NOTE
He has metastatic small cell lung cancer  Living will reviewed and discussed with patient, he is FULL CODE  Consulted Dr Clay  Had long conversation about his progressing lung ca and treatments. He is ready for hospice, will consult. He verbalized understanding

## 2018-03-21 NOTE — PLAN OF CARE
03/21/18 0948   Discharge Reassessment   Assessment Type Discharge Planning Reassessment     Pt contact information for radha Cornejo [phone].

## 2018-03-21 NOTE — PROGRESS NOTES
[facility]  Cardiology  Progress Note    Patient Name: See Leo  MRN: [id number]  Admission Date: 3/19/2018  Hospital Length of Stay: 0 days  Code Status: Full Code   Attending Physician: Patricia Florian MD   Primary Care Physician: Suhail Gonzalez MD  Expected Discharge Date:   Principal Problem:Hypotension    Subjective:     Interval History: pt has opted for hospice care    Review of Systems   Constitution: Negative.   HENT: Negative.    Eyes: Negative.    Respiratory: Negative.    Endocrine: Negative.    Skin: Negative.    Musculoskeletal: Negative.    Gastrointestinal: Negative.    Genitourinary: Negative.    Neurological: Negative.    Psychiatric/Behavioral: Negative.    Allergic/Immunologic: Negative.      Objective:     Vital Signs (Most Recent):  Temp: 96.2 °F (35.7 °C) (03/21/18 0702)  Pulse: 79 (03/21/18 0800)  Resp: (!) 22 (03/21/18 0702)  BP: (!) 108/48 (03/21/18 0702)  SpO2: (!) 94 % (03/21/18 0705) Vital Signs (24h Range):  Temp:  [96.2 °F (35.7 °C)-98.2 °F (36.8 °C)] 96.2 °F (35.7 °C)  Pulse:  [42-99] 79  Resp:  [18-24] 22  SpO2:  [91 %-95 %] 94 %  BP: ()/(48-67) 108/48     Weight: 70.8 kg (156 lb)  Body mass index is 22.38 kg/m².    SpO2: (!) 94 %  O2 Device (Oxygen Therapy): nasal cannula      Intake/Output Summary (Last 24 hours) at 03/21/18 0905  Last data filed at 03/21/18 0412   Gross per 24 hour   Intake          2747.08 ml   Output              980 ml   Net          1767.08 ml       Lines/Drains/Airways     Peripheral Intravenous Line                 Peripheral IV - Single Lumen 03/19/18 1243 Right Antecubital 1 day         Peripheral IV - Single Lumen 03/20/18 Right;Anterior Forearm 1 day                Physical Exam    Significant Labs:   BMP:   Recent Labs  Lab 03/19/18  1248 03/20/18  0535 03/21/18  0557   * 110 106   * 136 132*   K 5.1 5.1 4.3   CL 98 101 101   CO2 26 25 22*   BUN 17 21 17   CREATININE 1.1 1.0 0.8   CALCIUM 9.3 9.1 8.5*   , CMP    Recent Labs  Lab 03/19/18  1248 03/20/18  0535 03/21/18  0557   * 136 132*   K 5.1 5.1 4.3   CL 98 101 101   CO2 26 25 22*   * 110 106   BUN 17 21 17   CREATININE 1.1 1.0 0.8   CALCIUM 9.3 9.1 8.5*   PROT 6.6 6.0 5.5*   ALBUMIN 2.7* 2.5* 2.2*   BILITOT 1.0 0.7 0.7   ALKPHOS 81 75 68   AST 14 14 14   ALT 14 13 13   ANIONGAP 8 10 9   ESTGFRAFRICA >60 >60 >60   EGFRNONAA 58* >60 >60   , CBC   Recent Labs  Lab 03/19/18  1248 03/20/18  0535 03/21/18  0557   WBC 10.90 11.63 11.70   HGB 13.6* 12.9* 12.5*   HCT 40.8 39.5* 37.8*    286 273    and Troponin   Recent Labs  Lab 03/20/18  1805 03/21/18  0039 03/21/18  0557   TROPONINI 0.026 0.028* 0.027*       Significant Imaging: Echocardiogram: 2D echo with color flow doppler: No results found for this or any previous visit.  Assessment and Plan:     Frail 89 recent death of wife. At baseline with chronic low BP  COPD with advanced L lung ca and progressive SOB, currently stable  ICMO s/p CRTD- TTE 1/23/18- EF 30%2+MR, 2+AR, 2+NM, PAP 42  MPI-7/11/16- no reversible ischemia. CAD failed PCI 7/14  Marginal BPs renal function at baseline.   Moderate Carotid disease    Plan:   Home with hospice care may be best option at this time, pending oncology opinion.  BP is marginal, would continue asprin, atorvastatin     Active Diagnoses:    Diagnosis Date Noted POA    PRINCIPAL PROBLEM:  Hypotension [I95.9] 08/26/2016 Yes    SOB (shortness of breath) [R06.02] 03/20/2018 No    Dehydration [E86.0]  Yes    Malignant neoplasm of lower lobe of left lung [C34.32] 02/06/2018 Yes    Acute chest pain [R07.9] 01/10/2018 Yes    Transient alteration of awareness [R40.4] 08/16/2017 Yes    Systolic heart failure secondary to coronary artery disease [I50.20, I25.10] 08/28/2016 Yes    Chronic atrial fibrillation [I48.2] 08/26/2016 Yes    Dementia [F03.90] 08/26/2016 Yes      Problems Resolved During this Admission:    Diagnosis Date Noted Date Resolved POA       VTE Risk  Mitigation         Ordered     enoxaparin injection 40 mg  Daily     Route:  Subcutaneous        03/20/18 0734     IP VTE HIGH RISK PATIENT  Once      03/19/18 1902     Place sequential compression device  Until discontinued      03/19/18 1902          Carmel Lovett NP  Cardiology  [facility]  I attest that I have personally seen and examined this patient. I have reviewed and discussed the management in detail as outlined above.

## 2018-03-22 VITALS
WEIGHT: 156 LBS | DIASTOLIC BLOOD PRESSURE: 60 MMHG | HEIGHT: 70 IN | SYSTOLIC BLOOD PRESSURE: 126 MMHG | HEART RATE: 72 BPM | TEMPERATURE: 97 F | RESPIRATION RATE: 23 BRPM | OXYGEN SATURATION: 87 % | BODY MASS INDEX: 22.33 KG/M2

## 2018-03-22 PROBLEM — R06.83 SNORING: Status: RESOLVED | Noted: 2017-08-29 | Resolved: 2018-03-22

## 2018-03-22 PROBLEM — R04.2 HEMOPTYSIS: Status: ACTIVE | Noted: 2018-03-22

## 2018-03-22 LAB
ALBUMIN SERPL BCP-MCNC: 2.4 G/DL
ALP SERPL-CCNC: 71 U/L
ALT SERPL W/O P-5'-P-CCNC: 15 U/L
ANION GAP SERPL CALC-SCNC: 11 MMOL/L
AST SERPL-CCNC: 16 U/L
BASOPHILS # BLD AUTO: 0.01 K/UL
BASOPHILS NFR BLD: 0.1 %
BILIRUB SERPL-MCNC: 1.1 MG/DL
BNP SERPL-MCNC: 604 PG/ML
BUN SERPL-MCNC: 14 MG/DL
CALCIUM SERPL-MCNC: 9.1 MG/DL
CHLORIDE SERPL-SCNC: 101 MMOL/L
CO2 SERPL-SCNC: 22 MMOL/L
CREAT SERPL-MCNC: 0.8 MG/DL
DIFFERENTIAL METHOD: ABNORMAL
EOSINOPHIL # BLD AUTO: 0 K/UL
EOSINOPHIL NFR BLD: 0.2 %
ERYTHROCYTE [DISTWIDTH] IN BLOOD BY AUTOMATED COUNT: 16.3 %
EST. GFR  (AFRICAN AMERICAN): >60 ML/MIN/1.73 M^2
EST. GFR  (NON AFRICAN AMERICAN): >60 ML/MIN/1.73 M^2
GLUCOSE SERPL-MCNC: 98 MG/DL
HCT VFR BLD AUTO: 38.3 %
HGB BLD-MCNC: 12.8 G/DL
LYMPHOCYTES # BLD AUTO: 0.9 K/UL
LYMPHOCYTES NFR BLD: 7.4 %
MCH RBC QN AUTO: 29 PG
MCHC RBC AUTO-ENTMCNC: 33.4 G/DL
MCV RBC AUTO: 87 FL
MONOCYTES # BLD AUTO: 0.8 K/UL
MONOCYTES NFR BLD: 6.3 %
NEUTROPHILS # BLD AUTO: 10.4 K/UL
NEUTROPHILS NFR BLD: 86 %
PLATELET # BLD AUTO: 270 K/UL
PMV BLD AUTO: 9.6 FL
POTASSIUM SERPL-SCNC: 4.3 MMOL/L
PROT SERPL-MCNC: 6.1 G/DL
RBC # BLD AUTO: 4.42 M/UL
SODIUM SERPL-SCNC: 134 MMOL/L
WBC # BLD AUTO: 12.13 K/UL

## 2018-03-22 PROCEDURE — 27000221 HC OXYGEN, UP TO 24 HOURS

## 2018-03-22 PROCEDURE — 99217 PR OBSERVATION CARE DISCHARGE: CPT | Mod: ,,, | Performed by: NURSE PRACTITIONER

## 2018-03-22 PROCEDURE — 83880 ASSAY OF NATRIURETIC PEPTIDE: CPT

## 2018-03-22 PROCEDURE — 36415 COLL VENOUS BLD VENIPUNCTURE: CPT

## 2018-03-22 PROCEDURE — 25000003 PHARM REV CODE 250: Performed by: FAMILY MEDICINE

## 2018-03-22 PROCEDURE — 94640 AIRWAY INHALATION TREATMENT: CPT

## 2018-03-22 PROCEDURE — 94761 N-INVAS EAR/PLS OXIMETRY MLT: CPT

## 2018-03-22 PROCEDURE — 80053 COMPREHEN METABOLIC PANEL: CPT

## 2018-03-22 PROCEDURE — 63600175 PHARM REV CODE 636 W HCPCS: Performed by: FAMILY MEDICINE

## 2018-03-22 PROCEDURE — 25000242 PHARM REV CODE 250 ALT 637 W/ HCPCS: Performed by: INTERNAL MEDICINE

## 2018-03-22 PROCEDURE — G0378 HOSPITAL OBSERVATION PER HR: HCPCS

## 2018-03-22 PROCEDURE — 85025 COMPLETE CBC W/AUTO DIFF WBC: CPT

## 2018-03-22 PROCEDURE — 63600175 PHARM REV CODE 636 W HCPCS: Performed by: NURSE PRACTITIONER

## 2018-03-22 RX ORDER — MORPHINE SULFATE 2 MG/ML
2 INJECTION, SOLUTION INTRAMUSCULAR; INTRAVENOUS ONCE
Status: COMPLETED | OUTPATIENT
Start: 2018-03-22 | End: 2018-03-22

## 2018-03-22 RX ORDER — LORAZEPAM 2 MG/ML
0.5 INJECTION INTRAMUSCULAR ONCE
Status: COMPLETED | OUTPATIENT
Start: 2018-03-22 | End: 2018-03-22

## 2018-03-22 RX ORDER — SPIRONOLACTONE 25 MG/1
25 TABLET ORAL DAILY
Status: DISCONTINUED | OUTPATIENT
Start: 2018-03-22 | End: 2018-03-22 | Stop reason: HOSPADM

## 2018-03-22 RX ORDER — ATORVASTATIN CALCIUM 20 MG/1
20 TABLET, FILM COATED ORAL DAILY
Status: DISCONTINUED | OUTPATIENT
Start: 2018-03-22 | End: 2018-03-22 | Stop reason: HOSPADM

## 2018-03-22 RX ORDER — MORPHINE SULFATE 10 MG/ML
INJECTION INTRAMUSCULAR; INTRAVENOUS; SUBCUTANEOUS
Status: DISCONTINUED
Start: 2018-03-22 | End: 2018-03-22 | Stop reason: HOSPADM

## 2018-03-22 RX ORDER — LORAZEPAM 2 MG/ML
0.5 INJECTION INTRAMUSCULAR
Status: DISCONTINUED | OUTPATIENT
Start: 2018-03-22 | End: 2018-03-22 | Stop reason: HOSPADM

## 2018-03-22 RX ORDER — ASPIRIN 81 MG/1
81 TABLET ORAL DAILY
Status: DISCONTINUED | OUTPATIENT
Start: 2018-03-22 | End: 2018-03-22 | Stop reason: HOSPADM

## 2018-03-22 RX ORDER — METOPROLOL TARTRATE 25 MG/1
TABLET, FILM COATED ORAL
Qty: 60 TABLET | Refills: 7 | Status: SHIPPED | OUTPATIENT
Start: 2018-03-22

## 2018-03-22 RX ADMIN — LORAZEPAM 0.5 MG: 2 INJECTION, SOLUTION INTRAMUSCULAR; INTRAVENOUS at 07:03

## 2018-03-22 RX ADMIN — MORPHINE SULFATE 2 MG: 2 INJECTION, SOLUTION INTRAMUSCULAR; INTRAVENOUS at 08:03

## 2018-03-22 RX ADMIN — PROMETHAZINE HYDROCHLORIDE AND CODEINE PHOSPHATE 5 ML: 6.25; 1 SOLUTION ORAL at 05:03

## 2018-03-22 RX ADMIN — IPRATROPIUM BROMIDE AND ALBUTEROL SULFATE 3 ML: 2.5; .5 SOLUTION RESPIRATORY (INHALATION) at 05:03

## 2018-03-22 RX ADMIN — MORPHINE SULFATE 2 MG: 2 INJECTION, SOLUTION INTRAMUSCULAR; INTRAVENOUS at 01:03

## 2018-03-22 NOTE — PLAN OF CARE
03/22/18 0955   Discharge Reassessment   Assessment Type Discharge Planning Reassessment     Sw left a message for call back with [facility] ([email or web address]) for callback. Sw will f/up.

## 2018-03-22 NOTE — SUBJECTIVE & OBJECTIVE
Interval History: sedated breathing well   cough/vomiting blood may be from lung and pt is swallowing blood from lung then vomiting it up.Family/patient to make him DNR     Objective:     Vital Signs (Most Recent):  Temp: 97.2 °F (36.2 °C) (03/22/18 0705)  Pulse: 71 (03/22/18 0817)  Resp: (!) 23 (03/22/18 0705)  BP: 126/60 (03/22/18 0705)  SpO2: (!) 92 % (03/22/18 0705) Vital Signs (24h Range):  Temp:  [96.8 °F (36 °C)-98.2 °F (36.8 °C)] 97.2 °F (36.2 °C)  Pulse:  [] 71  Resp:  [18-34] 23  SpO2:  [86 %-95 %] 92 %  BP: (119-134)/(56-64) 126/60     Weight: 70.8 kg (156 lb)  Body mass index is 22.38 kg/m².      Intake/Output Summary (Last 24 hours) at 03/22/18 0935  Last data filed at 03/22/18 0600   Gross per 24 hour   Intake              600 ml   Output             1350 ml   Net             -750 ml       Physical Exam   Constitutional: He is oriented to person, place, and time. He appears well-developed and well-nourished. He is cooperative.  Non-toxic appearance. He does not appear ill. No distress.   HENT:   Head: Normocephalic and atraumatic.   Right Ear: Hearing, tympanic membrane, external ear and ear canal normal.   Left Ear: Hearing, tympanic membrane, external ear and ear canal normal.   Nose: Nose normal. No mucosal edema, rhinorrhea or nasal deformity. No epistaxis. Right sinus exhibits no maxillary sinus tenderness and no frontal sinus tenderness. Left sinus exhibits no maxillary sinus tenderness and no frontal sinus tenderness.   Mouth/Throat: Uvula is midline, oropharynx is clear and moist and mucous membranes are normal. No trismus in the jaw. Normal dentition. No uvula swelling. No posterior oropharyngeal erythema.   Eyes: Conjunctivae and lids are normal. No scleral icterus.   Sclera clear bilat   Neck: Trachea normal, full passive range of motion without pain and phonation normal. Neck supple.   Cardiovascular: Normal rate, regular rhythm, normal heart sounds, intact distal pulses and normal  pulses.    Pulmonary/Chest: He is in respiratory distress (mild to moderate). He has decreased breath sounds in the right upper field, the right middle field, the right lower field, the left upper field, the left middle field and the left lower field. He has wheezes in the right lower field and the left lower field. He has rhonchi in the right middle field, the right lower field, the left middle field and the left lower field.           Abdominal: Soft. Normal appearance and bowel sounds are normal. He exhibits no distension. There is no tenderness.   Musculoskeletal: Normal range of motion. He exhibits no edema or deformity.   Neurological: He is alert and oriented to person, place, and time. He exhibits normal muscle tone. Coordination normal.   Skin: Skin is warm, dry and intact. He is not diaphoretic. No pallor.   Psychiatric: He has a normal mood and affect. His speech is normal and behavior is normal. Judgment and thought content normal. Cognition and memory are normal.   Nursing note and vitals reviewed.      Vents:  Oxygen Concentration (%): 100 (03/22/18 0547)    Lines/Drains/Airways     Peripheral Intravenous Line                 Peripheral IV - Single Lumen 03/19/18 1243 Right Antecubital 2 days         Peripheral IV - Single Lumen 03/20/18 Right;Anterior Forearm 2 days                Significant Labs:    CBC/Anemia Profile:    Recent Labs  Lab 03/21/18  0557 03/22/18  0626   WBC 11.70 12.13   HGB 12.5* 12.8*   HCT 37.8* 38.3*    270   MCV 87 87   RDW 16.2* 16.3*        Chemistries:    Recent Labs  Lab 03/21/18  0557 03/22/18  0626   * 134*   K 4.3 4.3    101   CO2 22* 22*   BUN 17 14   CREATININE 0.8 0.8   CALCIUM 8.5* 9.1   ALBUMIN 2.2* 2.4*   PROT 5.5* 6.1   BILITOT 0.7 1.1*   ALKPHOS 68 71   ALT 13 15   AST 14 16       All pertinent labs within the past 24 hours have been reviewed.    Significant Imaging:  I have reviewed all pertinent imaging results/findings within the past 24  hours.

## 2018-03-22 NOTE — DISCHARGE SUMMARY
"[facility] Medicine  Discharge Summary      Patient Name: See Leo  MRN: [id number]  Admission Date: 3/19/2018  Hospital Length of Stay: 0 days  Discharge Date and Time:  2018 10:38 AM  Attending Physician: Patricia Florian MD   Discharging Provider: Sarah Olvera NP  Primary Care Provider: Suhail Gonzalez MD      HPI:   Patient presented to ER today with change in mental status. He was at his wife's  and family reports he was "glazed over". No LOC but he was not responded to questions. Patient reports he was feeling SOB. He has chronic, intermittent chest pains and had some this morning. Denies palpitations. Reports very dark colored urine but denies dysuria, foul smelling urine. Family reports her mental status is back to baseline. On arrival was hypotensive and Cr 1.1. He did take all of his BP meds this AM. He reports he is feeling better this evening.     * No surgery found *      Hospital Course:   Pt placed in obs. He reports that he is not doing to well this am. He has been having a lot of accidents with all the fluids. He reports that he was not able to make his wife's  yesterday because he was too weak and fatigued. Heis Na is better 132>>136. He is on NS at 125ml/hr. Pressure better. BNP looks like stable for him 300s.     He still reports that he is SOB this am, but he reports that this is chronic for him. He is using nebs. He also reports that he has lung ca. Noted on CT in . He reports that he sees Dr Clay. D dimer was elevated but no increased SOB/no hypoxia. POX 94-96%. ( hx of parox afib not anticoagulated due to GI bleed)    3/21  Remains on IVF but only at 75ml/hr. //67. Afebrile. Requiring 4L O2 TKS >90%. Cta done yesterday to r/o PE, no PE due to elevated d dimer, but shows ca that has progressed. Dr Clay consulted. Pt seeing Dr Escobar in [facility] for oncology.     3/22  Came in this am to pt coughing up blood. He is anxious and " "SOB. He is unable to tolerate the venti mask and is satting 86% with 6L NC. Called Dr Gonzalez and ordered 0.5mg ativan and 2mg morphine. We discussed DNR status and hospice yesterday. Pt was agreeable. Son was at bedside and discussed POC with him. All were accepting. He had hospice meeting yesterday and all home supplies set up. He is suppose to go home this am with hospice. After the meds he has calmed, "breathing good now". Resting comfortably. Stable. Called daughter and grand daughter listed as emergency contacts and got voice mails for both.        Consults:   Consults         Status Ordering Provider     Inpatient consult to Cardiology-[facility]  Once     Provider:  (Not yet assigned)    Completed JD PAIZ     Inpatient consult to Pulmonology  Once     Provider:  Frantz Clay MD    Acknowledged MÓNICA VELIZ     Inpatient consult to Social Work  Once     Provider:  (Not yet assigned)    Completed MÓNICA VELIZ     IP consult to case management  Once     Provider:  (Not yet assigned)    Completed JD PAIZ          * Malignant neoplasm of lower lobe of left lung    He has metastatic small cell lung cancer  Consulted Dr Clay  Had long conversation yesterday with his PCP Dr Gonzalez as well as Dr Anderson his long term cardiologist about his progressing lung ca and treatments. He is ready for hospice, will consult. He verbalized understanding. DNR              Hemoptysis    lovenox and asa d/adali yesterday. Ativan and morphine given this am to help with anxiousness. He seems better. Plan is still to go home with hospice. Needs O2          SOB (shortness of breath)    And elevated d diner. Check CTA chest- negative but increasing size of lung ca  Lab Results   Component Value Date    DDIMER 1.83 (H) 03/19/2018     Has hx of GI bleed after anticoagulation from A fib  Now with hemoptysis, stopped asa and lovenox yesterday. Ativan and O2 as needed          Dehydration    Resolved with IVF        Acute " "chest pain    Likely due to lung ca but no c/o pain today  Morphine as needed        Transient alteration of awareness    Now resolved  Likely some dehydration, on IVF          Systolic heart failure secondary to coronary artery disease    No TTE in our system. I consulted [facility] for records review to tell us what EF is  Holding home ARB, BB and aldactone due to hypotension and dehydration but watch fluid status CLOSELY while giving IVF overnight.No signs of volume overload on exam currently  3/21  None this am either. Before we overload we reduced the fluids. Bp still low but normal 108/48. Watching closely BNP pending today  3/22  D/adali fluids Bp stable            Dementia    Cont home namenda  Per family, he is at baseline mental status           Chronic atrial fibrillation    Noted in chart--hold BB due to hypotension HR 69-*76  Telemetry- he is paced on EKG/tele  No anticoagulation and he is actively bleeding          Hypotension    Likely due to mild dehydration  Started IVF; Family reports "heart functioning at 25%"---unsure what his EF is. Will ask [facility] to give us records in the AM. Sees Dr. Anderson and seeing him here    Hold all home BP meds for today again as bp stable without, d/adali IVF yesterday          Final Active Diagnoses:    Diagnosis Date Noted POA    PRINCIPAL PROBLEM:  Malignant neoplasm of lower lobe of left lung [C34.32] 02/06/2018 Yes    Hemoptysis [R04.2] 03/22/2018 Yes    SOB (shortness of breath) [R06.02] 03/20/2018 No    Dehydration [E86.0]  Yes    Acute chest pain [R07.9] 01/10/2018 Yes    Transient alteration of awareness [R40.4] 08/16/2017 Yes    Systolic heart failure secondary to coronary artery disease [I50.20, I25.10] 08/28/2016 Yes    Hypotension [I95.9] 08/26/2016 Yes    Chronic atrial fibrillation [I48.2] 08/26/2016 Yes    Dementia [F03.90] 08/26/2016 Yes      Problems Resolved During this Admission:    Diagnosis Date Noted Date Resolved POA       Discharged Condition: " good    Disposition: Hospice/Home    Follow Up:  Follow-up Information     [facility].    Specialties:  Hospice and Palliative Medicine, Hospice Services  Contact information:  [address]  [address]  [facility] [id number]  [phone]                 Patient Instructions:   No discharge procedures on file.    Significant Diagnostic Studies:     Significant Labs:   CBC:      Recent Labs  Lab 03/21/18  0557 03/22/18  0626   WBC 11.70 12.13   HGB 12.5* 12.8*   HCT 37.8* 38.3*    270      CMP:      Recent Labs  Lab 03/21/18  0557 03/22/18  0626   * 134*   K 4.3 4.3    101   CO2 22* 22*    98   BUN 17 14   CREATININE 0.8 0.8   CALCIUM 8.5* 9.1   PROT 5.5* 6.1   ALBUMIN 2.2* 2.4*   BILITOT 0.7 1.1*   ALKPHOS 68 71   AST 14 16   ALT 13 15   ANIONGAP 9 11   EGFRNONAA >60 >60      Cardiac Markers:      Recent Labs  Lab 03/22/18  0626   *   looks like his baseline 300's     Troponin:      Recent Labs  Lab 03/20/18  1805 03/21/18  0039 03/21/18  0557   TROPONINI 0.026 0.028* 0.027*            Lab Results   Component Value Date     DDIMER 1.83 (H) 03/19/2018   has lung ca, chronic SOB, CTA negative PE           Lab Results   Component Value Date     INR 1.1 03/19/2018     INR 1.1 01/10/2018     INR 1.1 10/21/2017         Urinalysis  Recent Labs  Lab 03/20/18  0920   COLORU Yellow   SPECGRAV 1.015   PHUR 6.0   PROTEINUA Negative   NITRITE Negative   LEUKOCYTESUR Negative   UROBILINOGEN Negative            All pertinent labs within the past 24 hours have been reviewed.     Significant Imaging:      CXR   COPD changes are noted.  No consolidation or pleural effusions.  The heart is enlarged.  Calcified atheromatous disease affects the aorta.  Left-sided pacemaker device is in place.      3/19 EKG Ventricular-paced rhythm  Abnormal ECG  When compared with ECG of 10-CLAUDETTE-2018 15:33,  No significant change was found     3/20 EKG Ventricular-paced rhythm  Abnormal ECG  No  previous ECGs available     3/20 CTA  No evidence for pulmonary embolus.    Interval detrimental change with increased size of the left upper lobe pulmonary mass which now completely invades the left pulmonary roman with near complete occlusion of the left mainstem bronchus as well the left upper and lower lobe bronchi with encasement of the left main pulmonary artery and its left upper and lower lobe branches as well as the left lower lobe pulmonary vein.  There is post obstructive atelectasis/pneumonia involving the left lower lobe as well small bilateral pleural effusions.  Additionally, there has been interval increase in size of the groundglass spiculated density in the left upper lobe with interval development of a new solid nodule left upper lobe just worsening disease.    Cardiomegaly with reflux of contrast material into the IVC and hepatic veins suggesting elevated right heart pressures.      Pending Diagnostic Studies:     None         Medications:  Reconciled Home Medications:   Current Discharge Medication List      CONTINUE these medications which have NOT CHANGED    Details   albuterol-ipratropium 2.5mg-0.5mg/3mL (DUO-NEB) 0.5 mg-3 mg(2.5 mg base)/3 mL nebulizer solution Take 3 mLs by nebulization every 6 (six) hours as needed for Wheezing. Rescue  Qty: 1 Box, Refills: 2    Associated Diagnoses: Chronic obstructive pulmonary disease with acute exacerbation      memantine (NAMENDA) 10 MG Tab TAKE 1 TABLET (10 MG TOTAL) BY MOUTH 2 (TWO) TIMES DAILY.  Qty: 180 tablet, Refills: 3      tamsulosin (FLOMAX) 0.4 mg Cp24 TAKE 1 CAPSULE (0.4 MG TOTAL) BY MOUTH ONCE DAILY.  Qty: 30 capsule, Refills: 11         STOP taking these medications       aspirin (ECOTRIN) 81 MG EC tablet Comments:   Reason for Stopping:         atorvastatin (LIPITOR) 40 MG tablet Comments:   Reason for Stopping:         b complex vitamins capsule Comments:   Reason for Stopping:         budesonide (PULMICORT) 0.5 mg/2 mL nebulizer  solution Comments:   Reason for Stopping:         cyanocobalamin 2000 MCG tablet Comments:   Reason for Stopping:         docusate calcium (SURFAK) 240 mg capsule Comments:   Reason for Stopping:         latanoprost 0.005 % ophthalmic solution Comments:   Reason for Stopping:         metoprolol tartrate (LOPRESSOR) 25 MG tablet Comments:   Reason for Stopping:         NITROSTAT 0.4 mg SL tablet Comments:   Reason for Stopping:         RANEXA 1,000 mg Tb12 Comments:   Reason for Stopping:         spironolactone (ALDACTONE) 50 MG tablet Comments:   Reason for Stopping:         umeclidinium-vilanterol (ANORO ELLIPTA) 62.5-25 mcg/actuation DsDv Comments:   Reason for Stopping:         valsartan (DIOVAN) 40 MG tablet Comments:   Reason for Stopping:         VENTOLIN HFA 90 mcg/actuation inhaler Comments:   Reason for Stopping:               Indwelling Lines/Drains at time of discharge:   Lines/Drains/Airways          No matching active lines, drains, or airways          Time spent on the discharge of patient: 25 minutes  Patient was seen and examined on the date of discharge and determined to be suitable for discharge.         Sarah Olvera NP  Department of Hospital Medicine  [facility]

## 2018-03-22 NOTE — PLAN OF CARE
03/22/18 0901   Discharge Reassessment   Assessment Type Discharge Planning Reassessment     Sw contacted [facility] for last rights for the pt and left a message for Father Don/PJ. Sw will f/up.

## 2018-03-22 NOTE — PROGRESS NOTES
03/22/18 0540   Vital Signs   Resp (!) 34   SpO2 (!) 86 %   O2 Device (Oxygen Therapy) nasal cannula     Pt having coughing attack. Coughing up blood again with another nickel size clot. Respiratory notified.

## 2018-03-22 NOTE — PROGRESS NOTES
"[facility] Medicine  Progress Note    Patient Name: See Leo  MRN: [id number]  Patient Class: OP- Observation   Admission Date: 3/19/2018  Length of Stay: 0 days  Attending Physician: Patricia Florian MD  Primary Care Provider: Suhail Gonzalez MD        Subjective:     Principal Problem:Malignant neoplasm of lower lobe of left lung    HPI:  Patient presented to ER today with change in mental status. He was at his wife's  and family reports he was "glazed over". No LOC but he was not responded to questions. Patient reports he was feeling SOB. He has chronic, intermittent chest pains and had some this morning. Denies palpitations. Reports very dark colored urine but denies dysuria, foul smelling urine. Family reports her mental status is back to baseline. On arrival was hypotensive and Cr 1.1. He did take all of his BP meds this AM. He reports he is feeling better this evening.     Hospital Course:  Pt placed in obs. He reports that he is not doing to well this am. He has been having a lot of accidents with all the fluids. He reports that he was not able to make his wife's  yesterday because he was too weak and fatigued. Heis Na is better 132>>136. He is on NS at 125ml/hr. Pressure better. BNP looks like stable for him 300s.     He still reports that he is SOB this am, but he reports that this is chronic for him. He is using nebs. He also reports that he has lung ca. Noted on CT in . He reports that he sees Dr Clay. D dimer was elevated but no increased SOB/no hypoxia. POX 94-96%. ( hx of parox afib not anticoagulated due to GI bleed)    3/21  Remains on IVF but only at 75ml/hr. //67. Afebrile. Requiring 4L O2 TKS >90%. Cta done yesterday to r/o PE, no PE due to elevated d dimer, but shows ca that has progressed. Dr Clay consulted. Pt seeing Dr Escobar in [facility] for oncology.     3/22  Came in this am to pt coughing up blood. He is anxious and SOB. He is " "unable to tolerate the venti mask and is satting 86% with 6L NC. Called Dr Gonzalez and ordered 0.5mg ativan and 2mg morphine. We discussed DNR status and hospice yesterday. Pt was agreeable. Son was at bedside and discussed POC with him. All were accepting. He had hospice meeting yesterday and all home supplies set up. He is suppose to go home this am with hospice. After the meds he has calmed, "breathing good now". Resting comfortably. Stable. Called daughter and grand daughter listed as emergency contacts and got voice mails for both.         Review of Systems   Constitutional: Negative for activity change, fatigue, fever and unexpected weight change.   HENT: Negative for congestion, ear pain, hearing loss, rhinorrhea and sore throat.    Eyes: Negative for pain, redness and visual disturbance.   Respiratory: Positive for cough, shortness of breath and wheezing.         Coughing up bright red blood   Cardiovascular: Negative for chest pain, palpitations and leg swelling.   Gastrointestinal: Negative for abdominal pain, constipation, diarrhea, nausea and vomiting.   Genitourinary: Negative for decreased urine volume, dysuria, frequency and urgency.   Musculoskeletal: Negative for back pain, joint swelling and neck pain.   Skin: Negative for color change, rash and wound.   Neurological: Positive for weakness. Negative for dizziness, light-headedness and headaches.   Psychiatric/Behavioral: The patient is nervous/anxious.      Objective:     Vital Signs (Most Recent):  Temp: 97.2 °F (36.2 °C) (03/22/18 0326)  Pulse: 79 (03/22/18 0600)  Resp: (!) 24 (03/22/18 0550)  BP: 119/64 (03/22/18 0326)  SpO2: 95 % (03/22/18 0550) Vital Signs (24h Range):  Temp:  [96.8 °F (36 °C)-98.2 °F (36.8 °C)] 97.2 °F (36.2 °C)  Pulse:  [] 79  Resp:  [18-34] 24  SpO2:  [86 %-95 %] 95 %  BP: (119-134)/(56-64) 119/64     Weight: 70.8 kg (156 lb)  Body mass index is 22.38 kg/m².    Physical Exam   Constitutional: He appears " well-developed. No distress.   Lying in bed resting comfortably   HENT:   Head: Normocephalic and atraumatic.   Right Ear: External ear normal.   Left Ear: External ear normal.   Eyes: Conjunctivae and EOM are normal. Pupils are equal, round, and reactive to light. Right eye exhibits no discharge. Left eye exhibits no discharge.   Neck: Neck supple. No tracheal deviation present.   Cardiovascular: Normal rate and regular rhythm.    Murmur heard.  Pulmonary/Chest: Effort normal and breath sounds normal.   Coarse ronchi and gurgling   Abdominal: Soft. Bowel sounds are normal. He exhibits no distension. There is no tenderness.   Musculoskeletal: He exhibits no edema.   Neurological: He is alert. No cranial nerve deficit.   Skin: Skin is warm and dry.   Psychiatric: He has a normal mood and affect. His behavior is normal.   Nursing note and vitals reviewed.        CRANIAL NERVES     CN III, IV, VI   Pupils are equal, round, and reactive to light.  Extraocular motions are normal.        Significant Labs:   CBC:     Recent Labs  Lab 03/21/18  0557 03/22/18  0626   WBC 11.70 12.13   HGB 12.5* 12.8*   HCT 37.8* 38.3*    270     CMP:     Recent Labs  Lab 03/21/18  0557 03/22/18  0626   * 134*   K 4.3 4.3    101   CO2 22* 22*    98   BUN 17 14   CREATININE 0.8 0.8   CALCIUM 8.5* 9.1   PROT 5.5* 6.1   ALBUMIN 2.2* 2.4*   BILITOT 0.7 1.1*   ALKPHOS 68 71   AST 14 16   ALT 13 15   ANIONGAP 9 11   EGFRNONAA >60 >60     Cardiac Markers:     Recent Labs  Lab 03/22/18  0626   *   looks like his baseline 300's    Troponin:     Recent Labs  Lab 03/20/18  1805 03/21/18  0039 03/21/18  0557   TROPONINI 0.026 0.028* 0.027*     Lab Results   Component Value Date    DDIMER 1.83 (H) 03/19/2018   has lung ca, chronic SOB, CTA negative PE    Lab Results   Component Value Date    INR 1.1 03/19/2018    INR 1.1 01/10/2018    INR 1.1 10/21/2017       Urinalysis  Recent Labs  Lab 03/20/18  0920   COLORU Yellow    SPECGRAV 1.015   PHUR 6.0   PROTEINUA Negative   NITRITE Negative   LEUKOCYTESUR Negative   UROBILINOGEN Negative         All pertinent labs within the past 24 hours have been reviewed.    Significant Imaging:     CXR   COPD changes are noted.  No consolidation or pleural effusions.  The heart is enlarged.  Calcified atheromatous disease affects the aorta.  Left-sided pacemaker device is in place.     3/19 EKG Ventricular-paced rhythm  Abnormal ECG  When compared with ECG of 10-CLAUDETTE-2018 15:33,  No significant change was found    3/20 EKG Ventricular-paced rhythm  Abnormal ECG  No previous ECGs available    3/20 CTA  No evidence for pulmonary embolus.    Interval detrimental change with increased size of the left upper lobe pulmonary mass which now completely invades the left pulmonary roman with near complete occlusion of the left mainstem bronchus as well the left upper and lower lobe bronchi with encasement of the left main pulmonary artery and its left upper and lower lobe branches as well as the left lower lobe pulmonary vein.  There is post obstructive atelectasis/pneumonia involving the left lower lobe as well small bilateral pleural effusions.  Additionally, there has been interval increase in size of the groundglass spiculated density in the left upper lobe with interval development of a new solid nodule left upper lobe just worsening disease.    Cardiomegaly with reflux of contrast material into the IVC and hepatic veins suggesting elevated right heart pressures.      Assessment/Plan:      * Malignant neoplasm of lower lobe of left lung    He has metastatic small cell lung cancer  Consulted Dr Clay  Had long conversation yesterday with his PCP Dr Gonzalze as well as Dr Anderson his long term cardiologist about his progressing lung ca and treatments. He is ready for hospice, will consult. He verbalized understanding. DNR              Hemoptysis    lovenox and asa d/adali yesterday. Ativan and morphine given this  "am to help with anxiety. He seems better. Plan is still to go home with hospice. Needs O2          SOB (shortness of breath)    And elevated d diner. Check CTA chest- negative but increasing size of lung ca  Lab Results   Component Value Date    DDIMER 1.83 (H) 03/19/2018     Has hx of GI bleed after anticoagulation from A fib  Now with hemoptysis, stopped asa and lovenox yesterday. Ativan, morphine and O2 as needed          Dehydration    Resolved with IVF        Acute chest pain    Likely due to lung ca but no c/o pain today  Morphine as needed        Transient alteration of awareness    Now resolved  Likely some dehydration, on IVF          Systolic heart failure secondary to coronary artery disease    No TTE in our system. I consulted [facility] for records review to tell us what EF is  Holding home ARB, BB and aldactone due to hypotension and dehydration but watch fluid status CLOSELY while giving IVF overnight.No signs of volume overload on exam currently  3/21  None this am either. Before we overload we reduced the fluids. Bp still low but normal 108/48. Watching closely BNP pending today  3/22  D/adali fluids Bp stable            Dementia    Cont home namenda  Per family, he is at baseline mental status           Chronic atrial fibrillation    Noted in chart--hold BB due to hypotension HR 69-*76  Telemetry- he is paced on EKG/tele  No anticoagulation and he is actively bleeding (hemoptysis)          Hypotension    Likely due to mild dehydration  Started IVF; Family reports "heart functioning at 25%"---unsure what his EF is. Will ask [facility] to give us records in the AM. Sees Dr. Anderson and seeing him here    Hold all home BP meds for today again as bp stable without, d/adali IVF yesterday          VTE Risk Mitigation         Ordered     IP VTE HIGH RISK PATIENT  Once      03/19/18 1902     Place sequential compression device  Until discontinued      03/19/18 1902              Ana Bajwa MD  Department of Hospital " Medicine   [facility]

## 2018-03-22 NOTE — ASSESSMENT & PLAN NOTE
lovenox and asa d/adali yesterday. Ativan and morphine given this am to help with anxiety. He seems better. Plan is still to go home with hospice. Needs O2

## 2018-03-22 NOTE — NURSING
Pt resting comfortably. Not coughing. Oxygen sats 90% on 6L nasal cannula. Respirations down to 20 and HR 64.

## 2018-03-22 NOTE — PLAN OF CARE
03/22/18 1019   Discharge Reassessment   Assessment Type Discharge Planning Reassessment     Millie spoke with Shana from [place] and she will contact the office when the pt's pharmacy meds are in route. The pt will be d/c when the pharmacy has delivered his medications to his home. MILLIE will continue to follow and offer support as needed.    Kwabena Yi LMSW

## 2018-03-22 NOTE — PLAN OF CARE
03/22/18 1117   Discharge Reassessment   Assessment Type Discharge Planning Reassessment     Millie spoke with Shana from [place] and the pt's meds will be delivered at about 12:30pm. Sw will inform nurse for pt d/c. SW will continue to follow and offer support as needed.    Kwabena Yi LMSW

## 2018-03-22 NOTE — ASSESSMENT & PLAN NOTE
Pt is to be DNR  Wants hospice  Significant upper airway lung cancer R and L seeing dr lindsay in [place]  Wife  recently after hearing her  had lung cancer They wer  70+ years

## 2018-03-22 NOTE — PLAN OF CARE
Problem: Patient Care Overview  Goal: Plan of Care Review  Outcome: Ongoing (interventions implemented as appropriate)  Vitals stable, afebrile, no signs of distress noted. Lung sounds are diminished. Pt remains on 4L NC and does get really SOB with any type of exertion. Pt does have some anxiety that plays a factor in the SOB as well. Pt disoriented frequently, but is easily re-oriented. Pt has frequent cough, PRN Phenergan with codeine given during shift. Pt is coughing up bright red blood at time, and did have 1 nickel size clot before bed. MD aware. Cardiac monitor, paced. Fall precautions maintained,call bell in reach, bed locked and low, bed alarm in use and AVASYS in use. Pt agrees with plan of care.

## 2018-03-22 NOTE — NURSING
Called to patients room; lying in bed; coughing up moderate amount of jaqui blood. Non rebreather off. Sat pt upright; placed on nasal cannula at 6L. No longer coughing but pt extremely SOB; respirations up to 29/minute; oxygen sats 87%. Heart rate stable in the 70s. oxygen satsNP notified and new orders noted. Ativan 0.5mg and morphine 2mg administered. Will continue to monitor. Family notified.

## 2018-03-22 NOTE — ASSESSMENT & PLAN NOTE
And elevated d diner. Check CTA chest- negative but increasing size of lung ca  Lab Results   Component Value Date    DDIMER 1.83 (H) 03/19/2018     Has hx of GI bleed after anticoagulation from A fib  Now with hemoptysis, stopped asa and lovenox yesterday. Ativan, morphine and O2 as needed

## 2018-03-22 NOTE — PROGRESS NOTES
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    Subjective:     Interval History: sedated breathing well   cough/vomiting blood may be from lung and pt is swallowing blood from lung then vomiting it up.Family/patient to make him DNR     Objective:     Vital Signs (Most Recent):  Temp: 97.2 °F (36.2 °C) (03/22/18 0705)  Pulse: 71 (03/22/18 0817)  Resp: (!) 23 (03/22/18 0705)  BP: 126/60 (03/22/18 0705)  SpO2: (!) 92 % (03/22/18 0705) Vital Signs (24h Range):  Temp:  [96.8 °F (36 °C)-98.2 °F (36.8 °C)] 97.2 °F (36.2 °C)  Pulse:  [] 71  Resp:  [18-34] 23  SpO2:  [86 %-95 %] 92 %  BP: (119-134)/(56-64) 126/60     Weight: 70.8 kg (156 lb)  Body mass index is 22.38 kg/m².      Intake/Output Summary (Last 24 hours) at 03/22/18 0935  Last data filed at 03/22/18 0600   Gross per 24 hour   Intake              600 ml   Output             1350 ml   Net             -750 ml       Physical Exam   Constitutional: He is oriented to person, place, and time. He appears well-developed and well-nourished. He is cooperative.  Non-toxic appearance. He does not appear ill. No distress.   HENT:   Head: Normocephalic and atraumatic.   Right Ear: Hearing, tympanic membrane, external ear and ear canal normal.   Left Ear: Hearing, tympanic membrane, external ear and ear canal normal.   Nose: Nose normal. No mucosal edema, rhinorrhea or nasal deformity. No epistaxis. Right sinus exhibits no maxillary sinus tenderness and no frontal sinus tenderness. Left sinus exhibits no maxillary sinus tenderness and no frontal sinus tenderness.   Mouth/Throat: Uvula is midline, oropharynx is clear and moist and mucous membranes are normal. No trismus in the jaw. Normal  dentition. No uvula swelling. No posterior oropharyngeal erythema.   Eyes: Conjunctivae and lids are normal. No scleral icterus.   Sclera clear bilat   Neck: Trachea normal, full passive range of motion without pain and phonation normal. Neck supple.   Cardiovascular: Normal rate, regular rhythm, normal heart sounds, intact distal pulses and normal pulses.    Pulmonary/Chest: He is in respiratory distress (mild to moderate). He has decreased breath sounds in the right upper field, the right middle field, the right lower field, the left upper field, the left middle field and the left lower field. He has wheezes in the right lower field and the left lower field. He has rhonchi in the right middle field, the right lower field, the left middle field and the left lower field.           Abdominal: Soft. Normal appearance and bowel sounds are normal. He exhibits no distension. There is no tenderness.   Musculoskeletal: Normal range of motion. He exhibits no edema or deformity.   Neurological: He is alert and oriented to person, place, and time. He exhibits normal muscle tone. Coordination normal.   Skin: Skin is warm, dry and intact. He is not diaphoretic. No pallor.   Psychiatric: He has a normal mood and affect. His speech is normal and behavior is normal. Judgment and thought content normal. Cognition and memory are normal.   Nursing note and vitals reviewed.      Vents:  Oxygen Concentration (%): 100 (03/22/18 0547)    Lines/Drains/Airways     Peripheral Intravenous Line                 Peripheral IV - Single Lumen 03/19/18 1243 Right Antecubital 2 days         Peripheral IV - Single Lumen 03/20/18 Right;Anterior Forearm 2 days                Significant Labs:    CBC/Anemia Profile:    Recent Labs  Lab 03/21/18  0557 03/22/18  0626   WBC 11.70 12.13   HGB 12.5* 12.8*   HCT 37.8* 38.3*    270   MCV 87 87   RDW 16.2* 16.3*        Chemistries:    Recent Labs  Lab 03/21/18  0557 03/22/18  0626   * 134*   K 4.3  4.3    101   CO2 22* 22*   BUN 17 14   CREATININE 0.8 0.8   CALCIUM 8.5* 9.1   ALBUMIN 2.2* 2.4*   PROT 5.5* 6.1   BILITOT 0.7 1.1*   ALKPHOS 68 71   ALT 13 15   AST 14 16       All pertinent labs within the past 24 hours have been reviewed.    Significant Imaging:  I have reviewed all pertinent imaging results/findings within the past 24 hours.    Assessment/Plan:     * Malignant neoplasm of lower lobe of left lung    Pt is to be DNR  Wants hospice  Significant upper airway lung cancer R and L seeing dr lindsay in [place]  Wife  recently after hearing her  had lung cancer They wer  70+ years        Hemoptysis    Secondary to lung Cancer        SOB (shortness of breath)    Secondary to age COPD and lung cancer non small celln               Frantz Clay MD  Pulmonology  [facility]

## 2018-03-22 NOTE — ASSESSMENT & PLAN NOTE
He has metastatic small cell lung cancer  Consulted Dr Clay  Had long conversation yesterday with his PCP Dr Gonzalez as well as Dr Anderson his long term cardiologist about his progressing lung ca and treatments. He is ready for hospice, will consult. He verbalized understanding. DNR

## 2018-03-22 NOTE — PLAN OF CARE
Patient and his family are ready to go home. He states he does not want to die here and wants to discharge home ASAP. Patient is bed bound and is requiring oxygen. [facility] will assume care of patient once home and continue to offer support.

## 2018-03-22 NOTE — PROGRESS NOTES
Nursing Notes  Bedside report received. Pt stable, and is resting at present time. Family at bedside. Please see flow sheet for head to toe assessment.     Huddle Comments

## 2018-03-22 NOTE — PROGRESS NOTES
03/22/18 0550   Vital Signs   Resp (!) 24   SpO2 95 %   O2 Device (Oxygen Therapy) Non Rebreather Mask        Respiratory came, gave breathing treatment and tried venti mask, did not help much. Pt then placed on non re breather and oxygen is now at 95% on the non re breather. Pt is still breathing a little fast and is SOB but seems to be better than before. PRN cough medication given after pt calmed down and was not coughing up blood anymore. Coughing is slowing down. Will continue to monitor pt.

## 2018-03-22 NOTE — ASSESSMENT & PLAN NOTE
Noted in chart--hold BB due to hypotension HR 69-*76  Telemetry- he is paced on EKG/tele  No anticoagulation and he is actively bleeding (hemoptysis)

## 2018-03-22 NOTE — ASSESSMENT & PLAN NOTE
No TTE in our system. I consulted [facility] for records review to tell us what EF is  Holding home ARB, BB and aldactone due to hypotension and dehydration but watch fluid status CLOSELY while giving IVF overnight.No signs of volume overload on exam currently  3/21  None this am either. Before we overload we reduced the fluids. Bp still low but normal 108/48. Watching closely BNP pending today  3/22  D/adali fluids Bp stable

## 2018-03-22 NOTE — PROGRESS NOTES
[facility]  Cardiology  Progress Note    Patient Name: See Leo  MRN: [id number]  Admission Date: 3/19/2018  Hospital Length of Stay: 0 days  Code Status: DNR   Attending Physician: Patricia Florian MD   Primary Care Physician: Suhail Gonzalez MD  Expected Discharge Date:   Principal Problem:Hypotension    Subjective:       Interval History: DNR initiated. Agreed with hospice care.    ROS   Constitution: Negative.   HENT: Negative.    Eyes: Negative.    Respiratory: Negative.    Endocrine: Negative.    Skin: Negative.    Musculoskeletal: Negative.    Gastrointestinal: Negative.    Genitourinary: Negative.    Neurological: Negative.    Psychiatric/Behavioral: Negative.    Allergic/Immunologic: Negative.       Objective:     Vital Signs (Most Recent):  Temp: 97.2 °F (36.2 °C) (03/22/18 0705)  Pulse: 68 (03/22/18 0705)  Resp: (!) 23 (03/22/18 0705)  BP: 126/60 (03/22/18 0705)  SpO2: (!) 92 % (03/22/18 0705) Vital Signs (24h Range):  Temp:  [96.8 °F (36 °C)-98.2 °F (36.8 °C)] 97.2 °F (36.2 °C)  Pulse:  [] 68  Resp:  [18-34] 23  SpO2:  [86 %-95 %] 92 %  BP: (119-134)/(56-64) 126/60     Weight: 70.8 kg (156 lb)  Body mass index is 22.38 kg/m².    SpO2: (!) 92 %  O2 Device (Oxygen Therapy): Non Rebreather Mask      Intake/Output Summary (Last 24 hours) at 03/22/18 0838  Last data filed at 03/22/18 0600   Gross per 24 hour   Intake              840 ml   Output             1350 ml   Net             -510 ml       Lines/Drains/Airways     Peripheral Intravenous Line                 Peripheral IV - Single Lumen 03/19/18 1243 Right Antecubital 2 days         Peripheral IV - Single Lumen 03/20/18 Right;Anterior Forearm 2 days                Physical Exam   General appearance: alert, appears stated age and cooperative  Head: Normocephalic, without obvious abnormality, atraumatic  Eyes: conjunctivae/corneas clear. PERRL  Neck: no carotid bruit, no JVD and supple, symmetrical, trachea midline  Lungs: BBS  diminished in bases, normal respiratory effort  Chest Wall: no tenderness  Heart: regular rate and rhythm, S1, S2 normal, no murmur, click, rub or gallop  Abdomen: soft, non-tender; bowel sounds normal; no masses,  no organomegaly  Extremities: Extremities normal, atraumatic, no cyanosis, clubbing, or edema  Pulses: Dorsalis Pedis R: 2+ (normal)/L: 2+ (normal)  Skin: Skin color, texture, turgor normal. No rashes or lesions  Neurologic: Normal mood and affect  Alert and oriented X 3    Significant Labs:   ABG: No results for input(s): PH, PCO2, HCO3, POCSATURATED, BE in the last 48 hours., Blood Culture: No results for input(s): LABBLOO in the last 48 hours., BMP:   Recent Labs  Lab 03/21/18  0557 03/22/18  0626    98   * 134*   K 4.3 4.3    101   CO2 22* 22*   BUN 17 14   CREATININE 0.8 0.8   CALCIUM 8.5* 9.1   , CMP   Recent Labs  Lab 03/21/18  0557 03/22/18  0626   * 134*   K 4.3 4.3    101   CO2 22* 22*    98   BUN 17 14   CREATININE 0.8 0.8   CALCIUM 8.5* 9.1   PROT 5.5* 6.1   ALBUMIN 2.2* 2.4*   BILITOT 0.7 1.1*   ALKPHOS 68 71   AST 14 16   ALT 13 15   ANIONGAP 9 11   ESTGFRAFRICA >60 >60   EGFRNONAA >60 >60   , CBC   Recent Labs  Lab 03/21/18  0557 03/22/18  0626   WBC 11.70 12.13   HGB 12.5* 12.8*   HCT 37.8* 38.3*    270   , INR No results for input(s): INR, PROTIME in the last 48 hours., Lipid Panel No results for input(s): CHOL, HDL, LDLCALC, TRIG, CHOLHDL in the last 48 hours.,   Pathology Results  (Last 10 years)    None      , Troponin   Recent Labs  Lab 03/20/18  1805 03/21/18  0039 03/21/18  0557   TROPONINI 0.026 0.028* 0.027*   , All pertinent lab results from the last 24 hours have been reviewed. and   Recent Lab Results       03/22/18  0626      Albumin 2.4(L)     Alkaline Phosphatase 71     ALT 15     Anion Gap 11     AST 16     Baso # 0.01     Basophil% 0.1     Total Bilirubin 1.1  Comment:  For infants and newborns, interpretation of results should  be based  on gestational age, weight and in agreement with clinical  observations.  Premature Infant recommended reference ranges:  Up to 24 hours.............<8.0 mg/dL  Up to 48 hours............<12.0 mg/dL  3-5 days..................<15.0 mg/dL  6-29 days.................<15.0 mg/dL  (H)       Comment:  Values of less than 100 pg/ml are consistent with non-CHF populations.(H)     BUN, Bld 14     Calcium 9.1     Chloride 101     CO2 22(L)     Creatinine 0.8     Differential Method Automated     eGFR if  >60     eGFR if non  >60  Comment:  Calculation used to obtain the estimated glomerular filtration  rate (eGFR) is the CKD-EPI equation.        Eos # 0.0     Eosinophil% 0.2     Glucose 98     Gran # (ANC) 10.4(H)     Gran% 86.0(H)     Hematocrit 38.3(L)     Hemoglobin 12.8(L)     Lymph # 0.9(L)     Lymph% 7.4(L)     MCH 29.0     MCHC 33.4     MCV 87     Mono # 0.8     Mono% 6.3     MPV 9.6     Platelets 270     Potassium 4.3     Total Protein 6.1     RBC 4.42(L)     RDW 16.3(H)     Sodium 134(L)     WBC 12.13           Significant Imaging: CT scan: CT ABDOMEN PELVIS WITH CONTRAST: No results found for this visit on 03/19/18. and CT ABDOMEN PELVIS WITHOUT CONTRAST: No results found for this visit on 03/19/18., Echocardiogram: 2D echo with color flow doppler: No results found for this or any previous visit. and X-Ray: CXR: X-Ray Chest 1 View (CXR):   Results for orders placed or performed during the hospital encounter of 03/19/18   X-Ray Chest 1 View    Narrative    Chest, 1 view: COPD changes are noted.  No consolidation or pleural effusions.  The heart is enlarged.  Calcified atheromatous disease affects the aorta.  Left-sided pacemaker device is in place.    Impression     As above.      Electronically signed by: Eneida Montes De Oca MD  Date:     03/19/18  Time:    13:36     and X-Ray Chest PA and Lateral (CXR): No results found for this visit on 03/19/18. and KUB: X-Ray Abdomen  AP 1 View (KUB): No results found for this visit on 03/19/18.  Assessment and Plan:         Active Diagnoses:    Diagnosis Date Noted POA    PRINCIPAL PROBLEM:  Hypotension [I95.9] 08/26/2016 Yes    Hemoptysis [R04.2] 03/22/2018 Yes    SOB (shortness of breath) [R06.02] 03/20/2018 No    Dehydration [E86.0]  Yes    Malignant neoplasm of lower lobe of left lung [C34.32] 02/06/2018 Yes    Acute chest pain [R07.9] 01/10/2018 Yes    Transient alteration of awareness [R40.4] 08/16/2017 Yes    Systolic heart failure secondary to coronary artery disease [I50.20, I25.10] 08/28/2016 Yes    Chronic atrial fibrillation [I48.2] 08/26/2016 Yes    Dementia [F03.90] 08/26/2016 Yes      Problems Resolved During this Admission:    Diagnosis Date Noted Date Resolved POA       VTE Risk Mitigation         Ordered     IP VTE HIGH RISK PATIENT  Once      03/19/18 1902     Place sequential compression device  Until discontinued      03/19/18 1902        Current Facility-Administered Medications   Medication    acetaminophen tablet 650 mg    albuterol-ipratropium 2.5mg-0.5mg/3mL nebulizer solution 3 mL    docusate sodium capsule 100 mg    hydrocodone-acetaminophen 5-325mg per tablet 1 tablet    memantine tablet 10 mg    morphine 10 mg/mL injection    [COMPLETED] morphine injection 2 mg    ondansetron injection 4 mg    pantoprazole EC tablet 40 mg    promethazine (PHENERGAN) 12.5 mg in dextrose 5 % 50 mL IVPB    promethazine-codeine 6.25-10 mg/5 ml syrup 5 mL    tamsulosin 24 hr capsule 0.4 mg     Hemoptysis from lung Ca  Frail 89 recent death of wife. At baseline with chronic low BP  COPD  ICMO s/p CRTD- TTE 1/23/18- EF 30%2+MR, 2+AR, 2+OR, PAP 42  MPI-7/11/16- no reversible ischemia. CAD failed PCI 7/14  Marginal BPs renal function at baseline.   Moderate Carotid disease     PLAN:DNR  Low dose ARB as tolerated  May go home with Hospice  ecasa 81 mg atorvastatin and aldcatone low dose as tolerated  Will sign off.  Please reconsult if needed.     Transcribed by  JENNIFER Conway Dr  Cardiology  [facility]    I attest that I have personally seen and examined this patient. I have reviewed and discussed the management in detail as outlined above.

## 2018-03-22 NOTE — NURSING
"Pt coughing up moderate amount of bright red blood with 1 clot, nickel size, states "it happens when I cough a lot." Dr. Gonzalez notified who states to stop 325 mg ASA daily and Lovenox 40mg daily. Orders D/Arnulfo and Phenergan with codiene 5Ml ordered every 4 hours PRN for cough.   "

## 2018-03-22 NOTE — SUBJECTIVE & OBJECTIVE
Review of Systems   Constitutional: Negative for activity change, fatigue, fever and unexpected weight change.   HENT: Negative for congestion, ear pain, hearing loss, rhinorrhea and sore throat.    Eyes: Negative for pain, redness and visual disturbance.   Respiratory: Positive for cough, shortness of breath and wheezing.         Coughing up bright red blood   Cardiovascular: Negative for chest pain, palpitations and leg swelling.   Gastrointestinal: Negative for abdominal pain, constipation, diarrhea, nausea and vomiting.   Genitourinary: Negative for decreased urine volume, dysuria, frequency and urgency.   Musculoskeletal: Negative for back pain, joint swelling and neck pain.   Skin: Negative for color change, rash and wound.   Neurological: Positive for weakness. Negative for dizziness, light-headedness and headaches.   Psychiatric/Behavioral: The patient is nervous/anxious.      Objective:     Vital Signs (Most Recent):  Temp: 97.2 °F (36.2 °C) (03/22/18 0326)  Pulse: 79 (03/22/18 0600)  Resp: (!) 24 (03/22/18 0550)  BP: 119/64 (03/22/18 0326)  SpO2: 95 % (03/22/18 0550) Vital Signs (24h Range):  Temp:  [96.8 °F (36 °C)-98.2 °F (36.8 °C)] 97.2 °F (36.2 °C)  Pulse:  [] 79  Resp:  [18-34] 24  SpO2:  [86 %-95 %] 95 %  BP: (119-134)/(56-64) 119/64     Weight: 70.8 kg (156 lb)  Body mass index is 22.38 kg/m².    Physical Exam   Constitutional: He appears well-developed. No distress.   Lying in bed resting comfortably   HENT:   Head: Normocephalic and atraumatic.   Right Ear: External ear normal.   Left Ear: External ear normal.   Eyes: Conjunctivae and EOM are normal. Pupils are equal, round, and reactive to light. Right eye exhibits no discharge. Left eye exhibits no discharge.   Neck: Neck supple. No tracheal deviation present.   Cardiovascular: Normal rate and regular rhythm.    Murmur heard.  Pulmonary/Chest: Effort normal and breath sounds normal.   Coarse ronchi and gurgling   Abdominal: Soft. Bowel  sounds are normal. He exhibits no distension. There is no tenderness.   Musculoskeletal: He exhibits no edema.   Neurological: He is alert. No cranial nerve deficit.   Skin: Skin is warm and dry.   Psychiatric: He has a normal mood and affect. His behavior is normal.   Nursing note and vitals reviewed.        CRANIAL NERVES     CN III, IV, VI   Pupils are equal, round, and reactive to light.  Extraocular motions are normal.        Significant Labs:   CBC:     Recent Labs  Lab 03/21/18  0557 03/22/18  0626   WBC 11.70 12.13   HGB 12.5* 12.8*   HCT 37.8* 38.3*    270     CMP:     Recent Labs  Lab 03/21/18  0557 03/22/18  0626   * 134*   K 4.3 4.3    101   CO2 22* 22*    98   BUN 17 14   CREATININE 0.8 0.8   CALCIUM 8.5* 9.1   PROT 5.5* 6.1   ALBUMIN 2.2* 2.4*   BILITOT 0.7 1.1*   ALKPHOS 68 71   AST 14 16   ALT 13 15   ANIONGAP 9 11   EGFRNONAA >60 >60     Cardiac Markers:     Recent Labs  Lab 03/22/18  0626   *   looks like his baseline 300's    Troponin:     Recent Labs  Lab 03/20/18  1805 03/21/18  0039 03/21/18  0557   TROPONINI 0.026 0.028* 0.027*     Lab Results   Component Value Date    DDIMER 1.83 (H) 03/19/2018   has lung ca, chronic SOB, CTA negative PE    Lab Results   Component Value Date    INR 1.1 03/19/2018    INR 1.1 01/10/2018    INR 1.1 10/21/2017       Urinalysis  Recent Labs  Lab 03/20/18  0920   COLORU Yellow   SPECGRAV 1.015   PHUR 6.0   PROTEINUA Negative   NITRITE Negative   LEUKOCYTESUR Negative   UROBILINOGEN Negative         All pertinent labs within the past 24 hours have been reviewed.    Significant Imaging:     CXR   COPD changes are noted.  No consolidation or pleural effusions.  The heart is enlarged.  Calcified atheromatous disease affects the aorta.  Left-sided pacemaker device is in place.     3/19 EKG Ventricular-paced rhythm  Abnormal ECG  When compared with ECG of 10-CLAUDETTE-2018 15:33,  No significant change was found    3/20 EKG Ventricular-paced  rhythm  Abnormal ECG  No previous ECGs available    3/20 CTA  No evidence for pulmonary embolus.    Interval detrimental change with increased size of the left upper lobe pulmonary mass which now completely invades the left pulmonary roman with near complete occlusion of the left mainstem bronchus as well the left upper and lower lobe bronchi with encasement of the left main pulmonary artery and its left upper and lower lobe branches as well as the left lower lobe pulmonary vein.  There is post obstructive atelectasis/pneumonia involving the left lower lobe as well small bilateral pleural effusions.  Additionally, there has been interval increase in size of the groundglass spiculated density in the left upper lobe with interval development of a new solid nodule left upper lobe just worsening disease.    Cardiomegaly with reflux of contrast material into the IVC and hepatic veins suggesting elevated right heart pressures.

## 2018-03-22 NOTE — ASSESSMENT & PLAN NOTE
"Likely due to mild dehydration  Started IVF; Family reports "heart functioning at 25%"---unsure what his EF is. Will ask [facility] to give us records in the AM. Sees Dr. Anderson and seeing him here    Hold all home BP meds for today again as bp stable without, d/adali IVF yesterday  "

## 2018-03-22 NOTE — PLAN OF CARE
03/22/18 0956   Discharge Reassessment   Assessment Type Discharge Planning Reassessment     Sw spoke with  from [facility] and Father Don came last night to give the pt his last rights. SW will continue to follow and offer support as needed.    Kwabena Yi, MORENO

## 2018-03-23 NOTE — PLAN OF CARE
03/23/18 1413   Final Note   Assessment Type Final Discharge Note   Discharge Disposition [facility] Follow Up  Appt(s) scheduled? Yes   Discharge plans and expectations educations in teach back method with documentation complete? Yes   Right Care Referral Info   Post Acute Recommendation Home-care   Referral Type Hospice   Facility Name [facility] [place], [place]

## 2018-04-10 LAB
ACID FAST MOD KINY STN SPEC: NORMAL
MYCOBACTERIUM SPEC QL CULT: NORMAL

## 2019-08-09 NOTE — HPI
"89 year old male with h/o COPD and CHF(systolic) presents to ED with SOB and CP.  He is not the best historian due to dementia, but his family reports that home health nurse went to see him this afternoon.  While nurse was there,  he had a violent persistent episode of coughing that culminated in blue lips and a pulse ox of 80 at the time. Nurse felt he was "rattling" and wheezing. He apparently was reporting precordial CP at this time as well, so she sent him to ED. Justin saw him who suggested keeping for serial TPN and low dose IV lasix, BNP is 326.  "
89 year old male with history of severe 3 vessel CAD post CABG x 3, COPD, chronic bronchitis, O2 dependence, CHF, PAF, PPM, dyslipidemia, carotid artery disease, AICD, presents with c/o dull chest pain associated with cough x 2 weeks, worsened with deep inspiration, not associated with nausea, diaphoresis, weakness, dizziness, non-radiating, rated 5/10 in severity.   
99.8

## 2020-09-14 NOTE — ED NOTES
Bed rails are up and call light is within patient reach.  
Family at bedside.  
Family member at bedside is not familiar with patient's medications.  Unable to reconcile meds at this time.  I am waiting for family members to return from .  
House Supervisor notified of pt's admit order.  Pt educated, verbalizes understanding.  Cont to monitor  
Low NA meal tray provided to patient.   
Patient placed on continuous cardiac monitor, automatic blood pressure cuff and continuous pulse oximeter.  
Yes - the patient is able to be screened

## (undated) DEVICE — TUBE LUKI ASP COLL 6 1/4

## (undated) DEVICE — SYR ONLY LEUR TIP 30CC

## (undated) DEVICE — ELECTRODE ADULT MEDI-TRACE 500

## (undated) DEVICE — SET EXT W/2 VLVE PORTS 40

## (undated) DEVICE — VALVE OLYMPUS SCOPE SUCTION

## (undated) DEVICE — SEE MEDLINE ITEM 157117

## (undated) DEVICE — FORCEP PULMONARY RJ4

## (undated) DEVICE — SYR SLIP TIP 10ML SHIELD

## (undated) DEVICE — TUBE ASPIRATING LUKI 3-1/4IN

## (undated) DEVICE — KIT IV START OCHSNER CUSTOM

## (undated) DEVICE — VALVE OLYMPUS SCOPE BIOPSY

## (undated) DEVICE — SEE MEDLINE ITEM 146313

## (undated) DEVICE — SOL LR INJ 500 BG

## (undated) DEVICE — BRUSH CYTOLOGY DISPOSABLE

## (undated) DEVICE — OXISENSOR NEONATAL/ADULT N/S